# Patient Record
Sex: FEMALE | Race: ASIAN | NOT HISPANIC OR LATINO | URBAN - METROPOLITAN AREA
[De-identification: names, ages, dates, MRNs, and addresses within clinical notes are randomized per-mention and may not be internally consistent; named-entity substitution may affect disease eponyms.]

---

## 2018-03-31 ENCOUNTER — EMERGENCY (EMERGENCY)
Facility: HOSPITAL | Age: 73
LOS: 1 days | Discharge: HOME | End: 2018-03-31
Attending: EMERGENCY MEDICINE | Admitting: EMERGENCY MEDICINE
Payer: MEDICARE

## 2018-03-31 VITALS
DIASTOLIC BLOOD PRESSURE: 80 MMHG | OXYGEN SATURATION: 100 % | RESPIRATION RATE: 17 BRPM | HEART RATE: 70 BPM | TEMPERATURE: 98 F | SYSTOLIC BLOOD PRESSURE: 226 MMHG

## 2018-03-31 PROCEDURE — 99285 EMERGENCY DEPT VISIT HI MDM: CPT | Mod: 25,GC

## 2018-03-31 NOTE — ED ADULT TRIAGE NOTE - CHIEF COMPLAINT QUOTE
Pt. c/o headache and high blood pressure x 2 days; states she had headache yesterday after returning home from [facility], checked her BP and found it was 211/107, reports she then took "double dose" of her metoprolol and losartan. States symptoms resolved, but returned today again, and that her BP remains elevated despite medications. Admits to chest "heaviness", denies dizziness, vision changes, SOB, weakness, LOC, fever.

## 2018-04-01 VITALS
DIASTOLIC BLOOD PRESSURE: 84 MMHG | SYSTOLIC BLOOD PRESSURE: 194 MMHG | TEMPERATURE: 98 F | RESPIRATION RATE: 20 BRPM | HEART RATE: 89 BPM | OXYGEN SATURATION: 100 %

## 2018-04-01 LAB
ALBUMIN SERPL ELPH-MCNC: 4.3 G/DL — SIGNIFICANT CHANGE UP (ref 3.3–5)
ALP SERPL-CCNC: 53 U/L — SIGNIFICANT CHANGE UP (ref 40–120)
ALT FLD-CCNC: 16 U/L — SIGNIFICANT CHANGE UP (ref 4–33)
AST SERPL-CCNC: 24 U/L — SIGNIFICANT CHANGE UP (ref 4–32)
BASOPHILS # BLD AUTO: 0.03 K/UL — SIGNIFICANT CHANGE UP (ref 0–0.2)
BASOPHILS NFR BLD AUTO: 0.5 % — SIGNIFICANT CHANGE UP (ref 0–2)
BILIRUB SERPL-MCNC: 0.7 MG/DL — SIGNIFICANT CHANGE UP (ref 0.2–1.2)
BUN SERPL-MCNC: 11 MG/DL — SIGNIFICANT CHANGE UP (ref 7–23)
CALCIUM SERPL-MCNC: 9 MG/DL — SIGNIFICANT CHANGE UP (ref 8.4–10.5)
CHLORIDE SERPL-SCNC: 99 MMOL/L — SIGNIFICANT CHANGE UP (ref 98–107)
CO2 SERPL-SCNC: 28 MMOL/L — SIGNIFICANT CHANGE UP (ref 22–31)
CREAT SERPL-MCNC: 0.77 MG/DL — SIGNIFICANT CHANGE UP (ref 0.5–1.3)
EOSINOPHIL # BLD AUTO: 0.24 K/UL — SIGNIFICANT CHANGE UP (ref 0–0.5)
EOSINOPHIL NFR BLD AUTO: 3.6 % — SIGNIFICANT CHANGE UP (ref 0–6)
GLUCOSE SERPL-MCNC: 116 MG/DL — HIGH (ref 70–99)
HCT VFR BLD CALC: 39.3 % — SIGNIFICANT CHANGE UP (ref 34.5–45)
HGB BLD-MCNC: 13.2 G/DL — SIGNIFICANT CHANGE UP (ref 11.5–15.5)
IMM GRANULOCYTES # BLD AUTO: 0.03 # — SIGNIFICANT CHANGE UP
IMM GRANULOCYTES NFR BLD AUTO: 0.5 % — SIGNIFICANT CHANGE UP (ref 0–1.5)
LYMPHOCYTES # BLD AUTO: 2 K/UL — SIGNIFICANT CHANGE UP (ref 1–3.3)
LYMPHOCYTES # BLD AUTO: 30 % — SIGNIFICANT CHANGE UP (ref 13–44)
MCHC RBC-ENTMCNC: 31.1 PG — SIGNIFICANT CHANGE UP (ref 27–34)
MCHC RBC-ENTMCNC: 33.6 % — SIGNIFICANT CHANGE UP (ref 32–36)
MCV RBC AUTO: 92.7 FL — SIGNIFICANT CHANGE UP (ref 80–100)
MONOCYTES # BLD AUTO: 0.58 K/UL — SIGNIFICANT CHANGE UP (ref 0–0.9)
MONOCYTES NFR BLD AUTO: 8.7 % — SIGNIFICANT CHANGE UP (ref 2–14)
NEUTROPHILS # BLD AUTO: 3.78 K/UL — SIGNIFICANT CHANGE UP (ref 1.8–7.4)
NEUTROPHILS NFR BLD AUTO: 56.7 % — SIGNIFICANT CHANGE UP (ref 43–77)
NRBC # FLD: 0 — SIGNIFICANT CHANGE UP
PLATELET # BLD AUTO: 256 K/UL — SIGNIFICANT CHANGE UP (ref 150–400)
PMV BLD: 9 FL — SIGNIFICANT CHANGE UP (ref 7–13)
POTASSIUM SERPL-MCNC: 4.2 MMOL/L — SIGNIFICANT CHANGE UP (ref 3.5–5.3)
POTASSIUM SERPL-SCNC: 4.2 MMOL/L — SIGNIFICANT CHANGE UP (ref 3.5–5.3)
PROT SERPL-MCNC: 7.7 G/DL — SIGNIFICANT CHANGE UP (ref 6–8.3)
RBC # BLD: 4.24 M/UL — SIGNIFICANT CHANGE UP (ref 3.8–5.2)
RBC # FLD: 12.7 % — SIGNIFICANT CHANGE UP (ref 10.3–14.5)
SODIUM SERPL-SCNC: 139 MMOL/L — SIGNIFICANT CHANGE UP (ref 135–145)
TROPONIN T SERPL-MCNC: < 0.06 NG/ML — SIGNIFICANT CHANGE UP (ref 0–0.06)
WBC # BLD: 6.66 K/UL — SIGNIFICANT CHANGE UP (ref 3.8–10.5)
WBC # FLD AUTO: 6.66 K/UL — SIGNIFICANT CHANGE UP (ref 3.8–10.5)

## 2018-04-01 PROCEDURE — 71045 X-RAY EXAM CHEST 1 VIEW: CPT | Mod: 26

## 2018-04-01 PROCEDURE — 70450 CT HEAD/BRAIN W/O DYE: CPT | Mod: 26

## 2018-04-01 RX ORDER — LOSARTAN POTASSIUM 100 MG/1
100 TABLET, FILM COATED ORAL ONCE
Refills: 0 | Status: COMPLETED | OUTPATIENT
Start: 2018-04-01 | End: 2018-04-01

## 2018-04-01 RX ORDER — ACETAMINOPHEN 500 MG
975 TABLET ORAL ONCE
Refills: 0 | Status: COMPLETED | OUTPATIENT
Start: 2018-04-01 | End: 2018-04-01

## 2018-04-01 RX ORDER — LOSARTAN POTASSIUM 100 MG/1
300 TABLET, FILM COATED ORAL ONCE
Refills: 0 | Status: DISCONTINUED | OUTPATIENT
Start: 2018-04-01 | End: 2018-04-01

## 2018-04-01 RX ORDER — METOCLOPRAMIDE HCL 10 MG
10 TABLET ORAL ONCE
Refills: 0 | Status: COMPLETED | OUTPATIENT
Start: 2018-04-01 | End: 2018-04-01

## 2018-04-01 RX ORDER — HYDRALAZINE HCL 50 MG
10 TABLET ORAL ONCE
Refills: 0 | Status: COMPLETED | OUTPATIENT
Start: 2018-04-01 | End: 2018-04-01

## 2018-04-01 RX ADMIN — Medication 10 MILLIGRAM(S): at 02:20

## 2018-04-01 RX ADMIN — LOSARTAN POTASSIUM 100 MILLIGRAM(S): 100 TABLET, FILM COATED ORAL at 06:36

## 2018-04-01 RX ADMIN — Medication 10 MILLIGRAM(S): at 02:57

## 2018-04-01 RX ADMIN — Medication 975 MILLIGRAM(S): at 02:20

## 2018-04-01 RX ADMIN — Medication 975 MILLIGRAM(S): at 06:38

## 2018-04-01 NOTE — ED PROVIDER NOTE - MEDICAL DECISION MAKING DETAILS
71 yo F PMHx HTN p/w headache and elevated BPs, plan for CT head, EKG, labs + CE, BP control, reevaluate

## 2018-04-01 NOTE — ED PROVIDER NOTE - ATTENDING CONTRIBUTION TO CARE
73 y/o F with h/o HTN (on losartan and metoprolol) here with hypertension.  Pt recently returned from a trip to the [facility] 2 days ago and since returning has noted her BP has been running high, associated with intermittent headaches and chest discomfort.  Pt initially took an extra dose of her home medications, but tonight BP was in the 200s systolic and she came to ER.  Pt currently endorsing occipital headache.  No fever, chills, cough, neck pain or stiffness, SOB, abd pain, n/v, vision changes, weakness, numbness, leg pain or swelling.  Pt states she had some left sided chest discomfort earlier this evening, but not at this time.  Well appearing, lying comfortably in stretcher, awake and alert, nontoxic.  VSS, hypertensive.  NCAT, EOMI, PERRL.  Neck is supple, FROM, no rigidity.  Lungs cta bl.  Cards nl S1/S2, RRR, no MRG.  Abd soft ntnd.  No pedal edema or calf tenderness.  Distal pulses full and equal bilaterally.  No focal neuro deficits.  Plan to pain control, labs, ekg, ct head, reassess for BP control.  Sxs are vague with no focal deficits, but in setting of HTN will need to r/o end-organ involvement.

## 2018-04-01 NOTE — ED PROVIDER NOTE - OBJECTIVE STATEMENT
71 yo F PMHx HTN p/w headache and elevated BPs. Pt arrived from the [facility] 2 days ago and started having headaches at home. When she checked her BP at home it was elevated and she took double her regular doses of medications (Losartan 300 mg, Metoprolol 50 mg). Her headache improved and then she had a headache again today which prompted her to come to the ER. She also has some L-sided chest pressure which began yesterday with her headache. She denies vision changes, N/V, abd pain, fevers/chills, weakness, numbness or tingling.

## 2018-04-01 NOTE — ED ADULT NURSE NOTE - OBJECTIVE STATEMENT
Pt arrives to ED room 1 2 days after a flight from the [facility].  Pt c/o high BP the last couple of days with head and chest heaviness.  Pt denies pain.  Pt states only medical hx  is HTN which is usually controlled by her home meds.  Pt last took home meds at 9PM.  Pt states she doubled her dosage yesterday which resolved her symptoms but they came back.  20g iv placed to RT AC, labs drawn and sent.  Pt assessed by YARED GIORDANO.  Pt medicated as per EMAR.  Will continue to monitor.

## 2018-04-01 NOTE — ED PROVIDER NOTE - CARE PLAN
Principal Discharge DX:	Hypertension  Assessment and plan of treatment:	You were seen in the Emergency Department for elevated blood pressures.  1) Advance activity as tolerated.    2) Continue all previously prescribed medications as directed.    3) Follow up with your primary care physician in 24-48 hours - take copies of your results.    4) Return to the Emergency Department for worsening or persistent symptoms, and/or ANY NEW OR CONCERNING SYMPTOMS. If you have issues obtaining follow up, please call: [phone] to obtain a doctor or specialist who takes your insurance in your area.

## 2018-04-01 NOTE — ED ADULT NURSE NOTE - CHIEF COMPLAINT QUOTE
Pt. c/o headache and high blood pressure x 2 days; states she had headache yesterday after returning home from [facility], checked her BP and found it was 211/107, reports she then took "double dose" of her metoprolol and losartan. States symptoms resolved, but returned today again, and that her BP remains elevated despite medications. Admits to chest "heaviness", denies dizziness, vision changes, SOB, weakness, LOC, fever.

## 2018-04-01 NOTE — ED PROVIDER NOTE - NS ED ROS FT
GENERAL: No fever or chills, EYES: no change in vision, HEENT: no trouble swallowing or speaking, CARDIAC: +chest pressure, PULMONARY: no cough or SOB, GI: no abdominal pain, no nausea, no vomiting, no diarrhea or constipation, : No changes in urination, SKIN: no rashes, NEURO: +headache,  MSK: No joint pain ~Joan Arreola M.D. Resident

## 2018-04-01 NOTE — ED PROVIDER NOTE - PLAN OF CARE
You were seen in the Emergency Department for elevated blood pressures.  1) Advance activity as tolerated.    2) Continue all previously prescribed medications as directed.    3) Follow up with your primary care physician in 24-48 hours - take copies of your results.    4) Return to the Emergency Department for worsening or persistent symptoms, and/or ANY NEW OR CONCERNING SYMPTOMS. If you have issues obtaining follow up, please call: [phone] to obtain a doctor or specialist who takes your insurance in your area.

## 2018-07-10 ENCOUNTER — EMERGENCY (EMERGENCY)
Facility: HOSPITAL | Age: 73
LOS: 1 days | Discharge: HOME | End: 2018-07-10
Attending: EMERGENCY MEDICINE | Admitting: EMERGENCY MEDICINE
Payer: MEDICARE

## 2018-07-10 VITALS
TEMPERATURE: 98 F | HEART RATE: 63 BPM | RESPIRATION RATE: 18 BRPM | SYSTOLIC BLOOD PRESSURE: 171 MMHG | OXYGEN SATURATION: 99 % | DIASTOLIC BLOOD PRESSURE: 71 MMHG

## 2018-07-10 VITALS
SYSTOLIC BLOOD PRESSURE: 159 MMHG | HEART RATE: 53 BPM | OXYGEN SATURATION: 99 % | RESPIRATION RATE: 18 BRPM | DIASTOLIC BLOOD PRESSURE: 49 MMHG

## 2018-07-10 LAB
ALBUMIN SERPL ELPH-MCNC: 4.5 G/DL — SIGNIFICANT CHANGE UP (ref 3.3–5)
ALP SERPL-CCNC: 58 U/L — SIGNIFICANT CHANGE UP (ref 40–120)
ALT FLD-CCNC: 23 U/L — SIGNIFICANT CHANGE UP (ref 4–33)
APPEARANCE UR: CLEAR — SIGNIFICANT CHANGE UP
AST SERPL-CCNC: 42 U/L — HIGH (ref 4–32)
BACTERIA # UR AUTO: SIGNIFICANT CHANGE UP
BASOPHILS # BLD AUTO: 0.04 K/UL — SIGNIFICANT CHANGE UP (ref 0–0.2)
BASOPHILS NFR BLD AUTO: 0.7 % — SIGNIFICANT CHANGE UP (ref 0–2)
BILIRUB SERPL-MCNC: 0.7 MG/DL — SIGNIFICANT CHANGE UP (ref 0.2–1.2)
BILIRUB UR-MCNC: NEGATIVE — SIGNIFICANT CHANGE UP
BLOOD UR QL VISUAL: NEGATIVE — SIGNIFICANT CHANGE UP
BUN SERPL-MCNC: 12 MG/DL — SIGNIFICANT CHANGE UP (ref 7–23)
CALCIUM SERPL-MCNC: 9.8 MG/DL — SIGNIFICANT CHANGE UP (ref 8.4–10.5)
CHLORIDE SERPL-SCNC: 101 MMOL/L — SIGNIFICANT CHANGE UP (ref 98–107)
CO2 SERPL-SCNC: 25 MMOL/L — SIGNIFICANT CHANGE UP (ref 22–31)
COLOR SPEC: SIGNIFICANT CHANGE UP
CREAT SERPL-MCNC: 0.81 MG/DL — SIGNIFICANT CHANGE UP (ref 0.5–1.3)
EOSINOPHIL # BLD AUTO: 0.31 K/UL — SIGNIFICANT CHANGE UP (ref 0–0.5)
EOSINOPHIL NFR BLD AUTO: 5.2 % — SIGNIFICANT CHANGE UP (ref 0–6)
GLUCOSE SERPL-MCNC: 100 MG/DL — HIGH (ref 70–99)
GLUCOSE UR-MCNC: NEGATIVE — SIGNIFICANT CHANGE UP
HCT VFR BLD CALC: 39.4 % — SIGNIFICANT CHANGE UP (ref 34.5–45)
HGB BLD-MCNC: 13.1 G/DL — SIGNIFICANT CHANGE UP (ref 11.5–15.5)
IMM GRANULOCYTES # BLD AUTO: 0.01 # — SIGNIFICANT CHANGE UP
IMM GRANULOCYTES NFR BLD AUTO: 0.2 % — SIGNIFICANT CHANGE UP (ref 0–1.5)
KETONES UR-MCNC: NEGATIVE — SIGNIFICANT CHANGE UP
LEUKOCYTE ESTERASE UR-ACNC: NEGATIVE — SIGNIFICANT CHANGE UP
LYMPHOCYTES # BLD AUTO: 2.34 K/UL — SIGNIFICANT CHANGE UP (ref 1–3.3)
LYMPHOCYTES # BLD AUTO: 39.4 % — SIGNIFICANT CHANGE UP (ref 13–44)
MCHC RBC-ENTMCNC: 31 PG — SIGNIFICANT CHANGE UP (ref 27–34)
MCHC RBC-ENTMCNC: 33.2 % — SIGNIFICANT CHANGE UP (ref 32–36)
MCV RBC AUTO: 93.1 FL — SIGNIFICANT CHANGE UP (ref 80–100)
MONOCYTES # BLD AUTO: 0.45 K/UL — SIGNIFICANT CHANGE UP (ref 0–0.9)
MONOCYTES NFR BLD AUTO: 7.6 % — SIGNIFICANT CHANGE UP (ref 2–14)
MUCOUS THREADS # UR AUTO: SIGNIFICANT CHANGE UP
NEUTROPHILS # BLD AUTO: 2.79 K/UL — SIGNIFICANT CHANGE UP (ref 1.8–7.4)
NEUTROPHILS NFR BLD AUTO: 46.9 % — SIGNIFICANT CHANGE UP (ref 43–77)
NITRITE UR-MCNC: POSITIVE — HIGH
NRBC # FLD: 0 — SIGNIFICANT CHANGE UP
PH UR: 6.5 — SIGNIFICANT CHANGE UP (ref 4.6–8)
PLATELET # BLD AUTO: 240 K/UL — SIGNIFICANT CHANGE UP (ref 150–400)
PMV BLD: 9 FL — SIGNIFICANT CHANGE UP (ref 7–13)
POTASSIUM SERPL-MCNC: 4.7 MMOL/L — SIGNIFICANT CHANGE UP (ref 3.5–5.3)
POTASSIUM SERPL-SCNC: 4.7 MMOL/L — SIGNIFICANT CHANGE UP (ref 3.5–5.3)
PROT SERPL-MCNC: 8.3 G/DL — SIGNIFICANT CHANGE UP (ref 6–8.3)
PROT UR-MCNC: NEGATIVE MG/DL — SIGNIFICANT CHANGE UP
RBC # BLD: 4.23 M/UL — SIGNIFICANT CHANGE UP (ref 3.8–5.2)
RBC # FLD: 12 % — SIGNIFICANT CHANGE UP (ref 10.3–14.5)
RBC CASTS # UR COMP ASSIST: SIGNIFICANT CHANGE UP (ref 0–?)
SODIUM SERPL-SCNC: 140 MMOL/L — SIGNIFICANT CHANGE UP (ref 135–145)
SP GR SPEC: 1.01 — SIGNIFICANT CHANGE UP (ref 1–1.04)
SQUAMOUS # UR AUTO: SIGNIFICANT CHANGE UP
UROBILINOGEN FLD QL: NORMAL MG/DL — SIGNIFICANT CHANGE UP
WBC # BLD: 5.94 K/UL — SIGNIFICANT CHANGE UP (ref 3.8–10.5)
WBC # FLD AUTO: 5.94 K/UL — SIGNIFICANT CHANGE UP (ref 3.8–10.5)
WBC UR QL: SIGNIFICANT CHANGE UP (ref 0–?)

## 2018-07-10 PROCEDURE — 70450 CT HEAD/BRAIN W/O DYE: CPT | Mod: 26

## 2018-07-10 PROCEDURE — 99284 EMERGENCY DEPT VISIT MOD MDM: CPT | Mod: GC

## 2018-07-10 RX ORDER — CEPHALEXIN 500 MG
1 CAPSULE ORAL
Qty: 20 | Refills: 0
Start: 2018-07-10 | End: 2018-07-19

## 2018-07-10 RX ORDER — SODIUM CHLORIDE 9 MG/ML
1000 INJECTION INTRAMUSCULAR; INTRAVENOUS; SUBCUTANEOUS ONCE
Refills: 0 | Status: COMPLETED | OUTPATIENT
Start: 2018-07-10 | End: 2018-07-10

## 2018-07-10 RX ORDER — MECLIZINE HCL 12.5 MG
25 TABLET ORAL ONCE
Refills: 0 | Status: COMPLETED | OUTPATIENT
Start: 2018-07-10 | End: 2018-07-10

## 2018-07-10 RX ORDER — CEFTRIAXONE 500 MG/1
1 INJECTION, POWDER, FOR SOLUTION INTRAMUSCULAR; INTRAVENOUS ONCE
Refills: 0 | Status: COMPLETED | OUTPATIENT
Start: 2018-07-10 | End: 2018-07-10

## 2018-07-10 RX ADMIN — CEFTRIAXONE 100 GRAM(S): 500 INJECTION, POWDER, FOR SOLUTION INTRAMUSCULAR; INTRAVENOUS at 13:45

## 2018-07-10 RX ADMIN — SODIUM CHLORIDE 1000 MILLILITER(S): 9 INJECTION INTRAMUSCULAR; INTRAVENOUS; SUBCUTANEOUS at 13:22

## 2018-07-10 NOTE — ED ADULT TRIAGE NOTE - CHIEF COMPLAINT QUOTE
Patient fell down 6 stairs on her back on Sunday. Pt states that yesterday she felt lightheaded and today it feels worse. Pt not sure if she hit her head but states that she did not lose consciousness.  Pt bruised her finger and right elbow.

## 2018-07-10 NOTE — ED PROVIDER NOTE - PROGRESS NOTE DETAILS
while attempting to ambulate pt, pt began to feel dizzy like room is spinning. symptoms resolved and pt was able to ambulate without difficulty. pt feels better. labs notable for UTI. head CT nonactionable, only chronic changes seen. will dc.

## 2018-07-10 NOTE — ED PROVIDER NOTE - OBJECTIVE STATEMENT
Bella Soto M.D: 72F hx HTN, on baby ASA, p/w lightheadeness described as generalized weakness since mechanical fall down 6 steps 2 days prior. unsure if lost consciousness, unsure if hit head. denies headaches, denies nausea, vomiting. no difficulties ambulating, no neck pain. no tinnitus. notes some blurry vision which is intermittent.  pt concerned she has a blood clot in her head. 72F hx HTN, on baby ASA, p/w lightheadedness described as generalized weakness since mechanical fall down 6 steps 2 days prior. unsure if lost consciousness, unsure if hit head. denies headaches, denies nausea, vomiting. no difficulties ambulating, no neck pain. no tinnitus. notes some blurry vision which is intermittent. pt concerned she has a blood clot in her head.

## 2018-07-10 NOTE — ED PROVIDER NOTE - MEDICAL DECISION MAKING DETAILS
72F p/w 2 days of lightheadedness and 'head not feeling right' since mechanical fall and likely head strike. only takes baby ASA and physical exam including full neurologic exam is wnl. suspect concussion given vague symptoms and likely head strike, but given new peripheral vertigo on exam (room spinning when stand up) will CT to eval for bleed. basic labs including urine to look for inciting event for fall.

## 2018-07-10 NOTE — ED PROVIDER NOTE - ATTENDING CONTRIBUTION TO CARE
Attending Attestation: Dr. Carver  I have personally performed a history and physical examination of the patient and discussed management with the resident as well as the patient.  I reviewed the resident's note and agree with the documented findings and plan of care.  I have authored and modified critical sections of the Provider Note, including but not limited to HPI, Physical Exam and MDM. 72F p/w 2 days of lightheadedness and 'head not feeling right' since mechanical fall and likely head strike. only takes baby ASA and physical exam including full neurologic exam is wnl. suspect concussion given vague symptoms and likely head strike, but given new peripheral vertigo on exam (room spinning when stand up) will CT to eval for bleed. basic labs including urine to look for inciting event for fall.

## 2018-07-10 NOTE — ED PROVIDER NOTE - PHYSICAL EXAMINATION
Bella Soto M.D.:   patient awake alert seen lying on stretcher NAD .   LUNGS CTAB no wheeze no crackle.   CARD RRR no m/r/g.    Abdomen soft NT ND no rebound no guarding no CVA tenderness.   EXT WWP no edema no calf tenderness CV 2+DP/PT bilaterally. no midline c/t/l spine tenderness  neuro A&Ox3 cn 2-12 intact, gross motor and sensory intact in all extremites gait normal.    skin warm and dry no rash  HEENT: moist mucous membranes, PERRL, EOMI

## 2018-07-10 NOTE — ED ADULT NURSE NOTE - OBJECTIVE STATEMENT
72 y female A+Ox3 presents to the ER with the complaint of dizziness. Pt states that on Sunday she fell down 6 stairs but denies LOC or hitting her head. Pt states she did not feel dizzy before her fall and she fell when she turned midway going down the stairs. Ecchymotic area noted on the inside of the right bicep. Pt states she felt fine after the fall but states yesterday she felt dizzy when she got up out of bed and today she felt the same way. Pt denies dizziness when she is sitting still or laying in bed but only when she gets up. States the only hx she has is htn.  Hx of allergie to penicillin. Denies any cardiac hx, cp, n/v. States she has a little discomfort on the back of her neck but no pain. Pt noted to be ambulating well without assistance. Labs drawn and sent, 20 g iv placed on right hand.

## 2018-07-11 LAB — SPECIMEN SOURCE: SIGNIFICANT CHANGE UP

## 2018-07-12 LAB — BACTERIA UR CULT: SIGNIFICANT CHANGE UP

## 2018-09-28 NOTE — ED PROVIDER NOTE - GASTROINTESTINAL, MLM
Seen by resource clinican and deemed appropriate to go home.  Pt is willing and agreeable to f/u with safety plan.    Abdomen soft, non-tender, no guarding.

## 2020-01-29 ENCOUNTER — APPOINTMENT (OUTPATIENT)
Dept: ORTHOPEDIC SURGERY | Facility: CLINIC | Age: 75
End: 2020-01-29
Payer: MEDICARE

## 2020-01-29 VITALS
WEIGHT: 140 LBS | SYSTOLIC BLOOD PRESSURE: 134 MMHG | BODY MASS INDEX: 27.48 KG/M2 | HEIGHT: 60 IN | HEART RATE: 70 BPM | DIASTOLIC BLOOD PRESSURE: 74 MMHG

## 2020-01-29 PROCEDURE — 99203 OFFICE O/P NEW LOW 30 MIN: CPT

## 2020-01-29 PROCEDURE — 73562 X-RAY EXAM OF KNEE 3: CPT | Mod: 50

## 2020-04-23 ENCOUNTER — APPOINTMENT (OUTPATIENT)
Dept: ORTHOPEDIC SURGERY | Facility: CLINIC | Age: 75
End: 2020-04-23

## 2020-04-29 ENCOUNTER — APPOINTMENT (OUTPATIENT)
Dept: ORTHOPEDIC SURGERY | Facility: CLINIC | Age: 75
End: 2020-04-29

## 2022-02-02 ENCOUNTER — APPOINTMENT (OUTPATIENT)
Dept: ORTHOPEDIC SURGERY | Facility: CLINIC | Age: 77
End: 2022-02-02
Payer: MEDICARE

## 2022-02-02 VITALS
HEART RATE: 83 BPM | DIASTOLIC BLOOD PRESSURE: 86 MMHG | WEIGHT: 146 LBS | HEIGHT: 60 IN | BODY MASS INDEX: 28.66 KG/M2 | SYSTOLIC BLOOD PRESSURE: 143 MMHG

## 2022-02-02 PROCEDURE — 73562 X-RAY EXAM OF KNEE 3: CPT | Mod: 50

## 2022-02-02 PROCEDURE — 99214 OFFICE O/P EST MOD 30 MIN: CPT

## 2022-03-17 ENCOUNTER — FORM ENCOUNTER (OUTPATIENT)
Age: 77
End: 2022-03-17

## 2022-03-18 ENCOUNTER — APPOINTMENT (OUTPATIENT)
Dept: ORTHOPEDIC SURGERY | Facility: HOSPITAL | Age: 77
End: 2022-03-18

## 2022-03-29 ENCOUNTER — OUTPATIENT (OUTPATIENT)
Dept: OUTPATIENT SERVICES | Facility: HOSPITAL | Age: 77
LOS: 1 days | End: 2022-03-29
Payer: MEDICARE

## 2022-03-29 VITALS
TEMPERATURE: 97 F | HEIGHT: 58.5 IN | DIASTOLIC BLOOD PRESSURE: 75 MMHG | HEART RATE: 61 BPM | OXYGEN SATURATION: 98 % | WEIGHT: 145.06 LBS | RESPIRATION RATE: 16 BRPM | SYSTOLIC BLOOD PRESSURE: 120 MMHG

## 2022-03-29 DIAGNOSIS — I10 ESSENTIAL (PRIMARY) HYPERTENSION: ICD-10-CM

## 2022-03-29 DIAGNOSIS — Z90.49 ACQUIRED ABSENCE OF OTHER SPECIFIED PARTS OF DIGESTIVE TRACT: Chronic | ICD-10-CM

## 2022-03-29 DIAGNOSIS — E11.9 TYPE 2 DIABETES MELLITUS WITHOUT COMPLICATIONS: ICD-10-CM

## 2022-03-29 DIAGNOSIS — M19.90 UNSPECIFIED OSTEOARTHRITIS, UNSPECIFIED SITE: ICD-10-CM

## 2022-03-29 DIAGNOSIS — M17.11 UNILATERAL PRIMARY OSTEOARTHRITIS, RIGHT KNEE: ICD-10-CM

## 2022-03-29 LAB
A1C WITH ESTIMATED AVERAGE GLUCOSE RESULT: 7.2 % — HIGH (ref 4–5.6)
ANION GAP SERPL CALC-SCNC: 13 MMOL/L — SIGNIFICANT CHANGE UP (ref 7–14)
APPEARANCE UR: CLEAR — SIGNIFICANT CHANGE UP
BILIRUB UR-MCNC: NEGATIVE — SIGNIFICANT CHANGE UP
BLD GP AB SCN SERPL QL: NEGATIVE — SIGNIFICANT CHANGE UP
BUN SERPL-MCNC: 18 MG/DL — SIGNIFICANT CHANGE UP (ref 7–23)
CALCIUM SERPL-MCNC: 9.1 MG/DL — SIGNIFICANT CHANGE UP (ref 8.4–10.5)
CHLORIDE SERPL-SCNC: 104 MMOL/L — SIGNIFICANT CHANGE UP (ref 98–107)
CO2 SERPL-SCNC: 25 MMOL/L — SIGNIFICANT CHANGE UP (ref 22–31)
COLOR SPEC: SIGNIFICANT CHANGE UP
CREAT SERPL-MCNC: 1.11 MG/DL — SIGNIFICANT CHANGE UP (ref 0.5–1.3)
DIFF PNL FLD: NEGATIVE — SIGNIFICANT CHANGE UP
EGFR: 51 ML/MIN/1.73M2 — LOW
ESTIMATED AVERAGE GLUCOSE: 160 — SIGNIFICANT CHANGE UP
GLUCOSE SERPL-MCNC: 140 MG/DL — HIGH (ref 70–99)
GLUCOSE UR QL: ABNORMAL
HCT VFR BLD CALC: 37.3 % — SIGNIFICANT CHANGE UP (ref 34.5–45)
HGB BLD-MCNC: 12.4 G/DL — SIGNIFICANT CHANGE UP (ref 11.5–15.5)
KETONES UR-MCNC: NEGATIVE — SIGNIFICANT CHANGE UP
LEUKOCYTE ESTERASE UR-ACNC: NEGATIVE — SIGNIFICANT CHANGE UP
MCHC RBC-ENTMCNC: 32.4 PG — SIGNIFICANT CHANGE UP (ref 27–34)
MCHC RBC-ENTMCNC: 33.2 GM/DL — SIGNIFICANT CHANGE UP (ref 32–36)
MCV RBC AUTO: 97.4 FL — SIGNIFICANT CHANGE UP (ref 80–100)
NITRITE UR-MCNC: NEGATIVE — SIGNIFICANT CHANGE UP
NRBC # BLD: 0 /100 WBCS — SIGNIFICANT CHANGE UP
NRBC # FLD: 0 K/UL — SIGNIFICANT CHANGE UP
PH UR: 5.5 — SIGNIFICANT CHANGE UP (ref 5–8)
PLATELET # BLD AUTO: 261 K/UL — SIGNIFICANT CHANGE UP (ref 150–400)
POTASSIUM SERPL-MCNC: 4 MMOL/L — SIGNIFICANT CHANGE UP (ref 3.5–5.3)
POTASSIUM SERPL-SCNC: 4 MMOL/L — SIGNIFICANT CHANGE UP (ref 3.5–5.3)
PROT UR-MCNC: ABNORMAL
RBC # BLD: 3.83 M/UL — SIGNIFICANT CHANGE UP (ref 3.8–5.2)
RBC # FLD: 12.3 % — SIGNIFICANT CHANGE UP (ref 10.3–14.5)
RH IG SCN BLD-IMP: POSITIVE — SIGNIFICANT CHANGE UP
SODIUM SERPL-SCNC: 142 MMOL/L — SIGNIFICANT CHANGE UP (ref 135–145)
SP GR SPEC: 1.01 — SIGNIFICANT CHANGE UP (ref 1–1.05)
UROBILINOGEN FLD QL: SIGNIFICANT CHANGE UP
WBC # BLD: 5.62 K/UL — SIGNIFICANT CHANGE UP (ref 3.8–10.5)
WBC # FLD AUTO: 5.62 K/UL — SIGNIFICANT CHANGE UP (ref 3.8–10.5)

## 2022-03-29 PROCEDURE — 93010 ELECTROCARDIOGRAM REPORT: CPT

## 2022-03-29 RX ORDER — TRAMADOL HYDROCHLORIDE 50 MG/1
25 TABLET ORAL ONCE
Refills: 0 | Status: DISCONTINUED | OUTPATIENT
Start: 2022-04-08 | End: 2022-04-08

## 2022-03-29 RX ORDER — PANTOPRAZOLE SODIUM 20 MG/1
40 TABLET, DELAYED RELEASE ORAL ONCE
Refills: 0 | Status: COMPLETED | OUTPATIENT
Start: 2022-04-08 | End: 2022-04-08

## 2022-03-29 RX ORDER — SODIUM CHLORIDE 9 MG/ML
1000 INJECTION, SOLUTION INTRAVENOUS
Refills: 0 | Status: DISCONTINUED | OUTPATIENT
Start: 2022-04-08 | End: 2022-04-09

## 2022-03-29 RX ORDER — ACETAMINOPHEN 500 MG
1000 TABLET ORAL ONCE
Refills: 0 | Status: COMPLETED | OUTPATIENT
Start: 2022-04-08 | End: 2022-04-08

## 2022-03-29 NOTE — H&P PST ADULT - NSANTHOSAYNRD_GEN_A_CORE
No. EDISON screening performed.  STOP BANG Legend: 0-2 = LOW Risk; 3-4 = INTERMEDIATE Risk; 5-8 = HIGH Risk

## 2022-03-29 NOTE — H&P PST ADULT - MUSCULOSKELETAL COMMENTS
hx of right knee pain which has been worsening, dx with primary osteoarthritis right knee scheduled for Right Total Knee Replacement right knee pain

## 2022-03-29 NOTE — H&P PST ADULT - PROBLEM SELECTOR PLAN 1
Right Total Knee Replacement 4/8/22    CBC BMP T&S UA CS HgbA1C nasal MRSA EKG    Preop instructions and antibacterial soap given and explained (verbal and written), with teach back.

## 2022-03-29 NOTE — H&P PST ADULT - PROBLEM SELECTOR PLAN 2
Pt advised to take her Telmisartan morning of surgery as usual  with sip of water  Pt was seen by her cardiologist, clearance, stress and echo on chart

## 2022-03-29 NOTE — H&P PST ADULT - HISTORY OF PRESENT ILLNESS
77 y/o female with hx of right knee pain which has been worsening, dx with primary osteoarthritis right knee scheduled for Right Total Knee Replacement

## 2022-03-29 NOTE — H&P PST ADULT - NSICDXPASTMEDICALHX_GEN_ALL_CORE_FT
PAST MEDICAL HISTORY:  Diabetes mellitus recently started on Januvia    HTN (hypertension)     Hyperlipidemia

## 2022-04-01 ENCOUNTER — APPOINTMENT (OUTPATIENT)
Dept: ORTHOPEDIC SURGERY | Facility: CLINIC | Age: 77
End: 2022-04-01

## 2022-04-05 ENCOUNTER — OUTPATIENT (OUTPATIENT)
Dept: OUTPATIENT SERVICES | Facility: HOSPITAL | Age: 77
LOS: 1 days | End: 2022-04-05

## 2022-04-05 DIAGNOSIS — Z01.818 ENCOUNTER FOR OTHER PREPROCEDURAL EXAMINATION: ICD-10-CM

## 2022-04-05 DIAGNOSIS — Z90.49 ACQUIRED ABSENCE OF OTHER SPECIFIED PARTS OF DIGESTIVE TRACT: Chronic | ICD-10-CM

## 2022-04-06 LAB
CULTURE RESULTS: SIGNIFICANT CHANGE UP
MRSA PCR RESULT.: SIGNIFICANT CHANGE UP
S AUREUS DNA NOSE QL NAA+PROBE: SIGNIFICANT CHANGE UP
SPECIMEN SOURCE: SIGNIFICANT CHANGE UP

## 2022-04-07 ENCOUNTER — TRANSCRIPTION ENCOUNTER (OUTPATIENT)
Age: 77
End: 2022-04-07

## 2022-04-08 ENCOUNTER — TRANSCRIPTION ENCOUNTER (OUTPATIENT)
Age: 77
End: 2022-04-08

## 2022-04-08 ENCOUNTER — APPOINTMENT (OUTPATIENT)
Dept: ORTHOPEDIC SURGERY | Facility: HOSPITAL | Age: 77
End: 2022-04-08

## 2022-04-08 ENCOUNTER — INPATIENT (INPATIENT)
Facility: HOSPITAL | Age: 77
LOS: 0 days | Discharge: HOME HEALTH | End: 2022-04-09
Attending: ORTHOPAEDIC SURGERY | Admitting: ORTHOPAEDIC SURGERY
Payer: MEDICARE

## 2022-04-08 ENCOUNTER — RESULT REVIEW (OUTPATIENT)
Age: 77
End: 2022-04-08

## 2022-04-08 VITALS
HEART RATE: 64 BPM | TEMPERATURE: 98 F | RESPIRATION RATE: 16 BRPM | DIASTOLIC BLOOD PRESSURE: 54 MMHG | SYSTOLIC BLOOD PRESSURE: 117 MMHG | HEIGHT: 58.5 IN | OXYGEN SATURATION: 99 % | WEIGHT: 145.06 LBS

## 2022-04-08 DIAGNOSIS — Z96.651 PRESENCE OF RIGHT ARTIFICIAL KNEE JOINT: Chronic | ICD-10-CM

## 2022-04-08 DIAGNOSIS — Z90.49 ACQUIRED ABSENCE OF OTHER SPECIFIED PARTS OF DIGESTIVE TRACT: Chronic | ICD-10-CM

## 2022-04-08 DIAGNOSIS — M17.11 UNILATERAL PRIMARY OSTEOARTHRITIS, RIGHT KNEE: ICD-10-CM

## 2022-04-08 LAB
ANION GAP SERPL CALC-SCNC: 11 MMOL/L — SIGNIFICANT CHANGE UP (ref 7–14)
BUN SERPL-MCNC: 19 MG/DL — SIGNIFICANT CHANGE UP (ref 7–23)
CALCIUM SERPL-MCNC: 8.5 MG/DL — SIGNIFICANT CHANGE UP (ref 8.4–10.5)
CHLORIDE SERPL-SCNC: 105 MMOL/L — SIGNIFICANT CHANGE UP (ref 98–107)
CO2 SERPL-SCNC: 22 MMOL/L — SIGNIFICANT CHANGE UP (ref 22–31)
CREAT SERPL-MCNC: 1.15 MG/DL — SIGNIFICANT CHANGE UP (ref 0.5–1.3)
EGFR: 49 ML/MIN/1.73M2 — LOW
GLUCOSE BLDC GLUCOMTR-MCNC: 142 MG/DL — HIGH (ref 70–99)
GLUCOSE BLDC GLUCOMTR-MCNC: 162 MG/DL — HIGH (ref 70–99)
GLUCOSE BLDC GLUCOMTR-MCNC: 180 MG/DL — HIGH (ref 70–99)
GLUCOSE BLDC GLUCOMTR-MCNC: 190 MG/DL — HIGH (ref 70–99)
GLUCOSE SERPL-MCNC: 155 MG/DL — HIGH (ref 70–99)
HCT VFR BLD CALC: 32 % — LOW (ref 34.5–45)
HGB BLD-MCNC: 11 G/DL — LOW (ref 11.5–15.5)
MCHC RBC-ENTMCNC: 32.8 PG — SIGNIFICANT CHANGE UP (ref 27–34)
MCHC RBC-ENTMCNC: 34.4 GM/DL — SIGNIFICANT CHANGE UP (ref 32–36)
MCV RBC AUTO: 95.5 FL — SIGNIFICANT CHANGE UP (ref 80–100)
NRBC # BLD: 0 /100 WBCS — SIGNIFICANT CHANGE UP
NRBC # FLD: 0 K/UL — SIGNIFICANT CHANGE UP
PLATELET # BLD AUTO: 193 K/UL — SIGNIFICANT CHANGE UP (ref 150–400)
POTASSIUM SERPL-MCNC: 4.4 MMOL/L — SIGNIFICANT CHANGE UP (ref 3.5–5.3)
POTASSIUM SERPL-SCNC: 4.4 MMOL/L — SIGNIFICANT CHANGE UP (ref 3.5–5.3)
RBC # BLD: 3.35 M/UL — LOW (ref 3.8–5.2)
RBC # FLD: 12 % — SIGNIFICANT CHANGE UP (ref 10.3–14.5)
SODIUM SERPL-SCNC: 138 MMOL/L — SIGNIFICANT CHANGE UP (ref 135–145)
WBC # BLD: 6.25 K/UL — SIGNIFICANT CHANGE UP (ref 3.8–10.5)
WBC # FLD AUTO: 6.25 K/UL — SIGNIFICANT CHANGE UP (ref 3.8–10.5)

## 2022-04-08 PROCEDURE — 88311 DECALCIFY TISSUE: CPT | Mod: 26

## 2022-04-08 PROCEDURE — 88305 TISSUE EXAM BY PATHOLOGIST: CPT | Mod: 26

## 2022-04-08 PROCEDURE — 73560 X-RAY EXAM OF KNEE 1 OR 2: CPT | Mod: 26,RT

## 2022-04-08 PROCEDURE — 27447 TOTAL KNEE ARTHROPLASTY: CPT | Mod: RT

## 2022-04-08 DEVICE — PATELLA VE 29MM: Type: IMPLANTABLE DEVICE | Status: FUNCTIONAL

## 2022-04-08 DEVICE — STEM TIBIA PERSONA SZ  5/CR: Type: IMPLANTABLE DEVICE | Status: FUNCTIONAL

## 2022-04-08 DEVICE — CEMENT SIMPLEX P 40GM: Type: IMPLANTABLE DEVICE | Status: FUNCTIONAL

## 2022-04-08 DEVICE — IMPLANTABLE DEVICE: Type: IMPLANTABLE DEVICE | Status: FUNCTIONAL

## 2022-04-08 DEVICE — ZIMMER FEMALE HEX SCREW MAGNETIC 2.5MM X 25MM: Type: IMPLANTABLE DEVICE | Status: FUNCTIONAL

## 2022-04-08 DEVICE — ZIMMER/NEXGEN SMOOTH PIN 3.2X75MM: Type: IMPLANTABLE DEVICE | Status: FUNCTIONAL

## 2022-04-08 DEVICE — SCREW HEX HEADED 3.5X48MM: Type: IMPLANTABLE DEVICE | Status: FUNCTIONAL

## 2022-04-08 RX ORDER — DEXTROSE 50 % IN WATER 50 %
15 SYRINGE (ML) INTRAVENOUS ONCE
Refills: 0 | Status: DISCONTINUED | OUTPATIENT
Start: 2022-04-08 | End: 2022-04-09

## 2022-04-08 RX ORDER — LOSARTAN POTASSIUM 100 MG/1
100 TABLET, FILM COATED ORAL DAILY
Refills: 0 | Status: DISCONTINUED | OUTPATIENT
Start: 2022-04-08 | End: 2022-04-09

## 2022-04-08 RX ORDER — GABAPENTIN 400 MG/1
100 CAPSULE ORAL THREE TIMES A DAY
Refills: 0 | Status: DISCONTINUED | OUTPATIENT
Start: 2022-04-09 | End: 2022-04-09

## 2022-04-08 RX ORDER — OXYCODONE HYDROCHLORIDE 5 MG/1
5 TABLET ORAL EVERY 4 HOURS
Refills: 0 | Status: DISCONTINUED | OUTPATIENT
Start: 2022-04-08 | End: 2022-04-09

## 2022-04-08 RX ORDER — KETOROLAC TROMETHAMINE 30 MG/ML
15 SYRINGE (ML) INJECTION EVERY 6 HOURS
Refills: 0 | Status: DISCONTINUED | OUTPATIENT
Start: 2022-04-08 | End: 2022-04-09

## 2022-04-08 RX ORDER — SENNA PLUS 8.6 MG/1
2 TABLET ORAL AT BEDTIME
Refills: 0 | Status: DISCONTINUED | OUTPATIENT
Start: 2022-04-08 | End: 2022-04-09

## 2022-04-08 RX ORDER — HYDROMORPHONE HYDROCHLORIDE 2 MG/ML
0.5 INJECTION INTRAMUSCULAR; INTRAVENOUS; SUBCUTANEOUS ONCE
Refills: 0 | Status: DISCONTINUED | OUTPATIENT
Start: 2022-04-08 | End: 2022-04-09

## 2022-04-08 RX ORDER — SODIUM CHLORIDE 9 MG/ML
1000 INJECTION, SOLUTION INTRAVENOUS
Refills: 0 | Status: DISCONTINUED | OUTPATIENT
Start: 2022-04-08 | End: 2022-04-09

## 2022-04-08 RX ORDER — METOPROLOL TARTRATE 50 MG
50 TABLET ORAL DAILY
Refills: 0 | Status: DISCONTINUED | OUTPATIENT
Start: 2022-04-08 | End: 2022-04-09

## 2022-04-08 RX ORDER — DEXTROSE 50 % IN WATER 50 %
25 SYRINGE (ML) INTRAVENOUS ONCE
Refills: 0 | Status: DISCONTINUED | OUTPATIENT
Start: 2022-04-08 | End: 2022-04-09

## 2022-04-08 RX ORDER — ACETAMINOPHEN 500 MG
975 TABLET ORAL EVERY 8 HOURS
Refills: 0 | Status: DISCONTINUED | OUTPATIENT
Start: 2022-04-08 | End: 2022-04-09

## 2022-04-08 RX ORDER — ATORVASTATIN CALCIUM 80 MG/1
80 TABLET, FILM COATED ORAL AT BEDTIME
Refills: 0 | Status: DISCONTINUED | OUTPATIENT
Start: 2022-04-08 | End: 2022-04-09

## 2022-04-08 RX ORDER — OXYCODONE HYDROCHLORIDE 5 MG/1
2.5 TABLET ORAL EVERY 4 HOURS
Refills: 0 | Status: DISCONTINUED | OUTPATIENT
Start: 2022-04-08 | End: 2022-04-09

## 2022-04-08 RX ORDER — DEXTROSE 50 % IN WATER 50 %
12.5 SYRINGE (ML) INTRAVENOUS ONCE
Refills: 0 | Status: DISCONTINUED | OUTPATIENT
Start: 2022-04-08 | End: 2022-04-09

## 2022-04-08 RX ORDER — ASPIRIN/CALCIUM CARB/MAGNESIUM 324 MG
325 TABLET ORAL
Refills: 0 | Status: DISCONTINUED | OUTPATIENT
Start: 2022-04-08 | End: 2022-04-09

## 2022-04-08 RX ORDER — SODIUM CHLORIDE 9 MG/ML
500 INJECTION, SOLUTION INTRAVENOUS ONCE
Refills: 0 | Status: COMPLETED | OUTPATIENT
Start: 2022-04-08 | End: 2022-04-08

## 2022-04-08 RX ORDER — MAGNESIUM HYDROXIDE 400 MG/1
30 TABLET, CHEWABLE ORAL DAILY
Refills: 0 | Status: DISCONTINUED | OUTPATIENT
Start: 2022-04-08 | End: 2022-04-09

## 2022-04-08 RX ORDER — INSULIN LISPRO 100/ML
VIAL (ML) SUBCUTANEOUS
Refills: 0 | Status: DISCONTINUED | OUTPATIENT
Start: 2022-04-08 | End: 2022-04-09

## 2022-04-08 RX ORDER — TRAMADOL HYDROCHLORIDE 50 MG/1
25 TABLET ORAL EVERY 8 HOURS
Refills: 0 | Status: DISCONTINUED | OUTPATIENT
Start: 2022-04-08 | End: 2022-04-09

## 2022-04-08 RX ORDER — SODIUM CHLORIDE 9 MG/ML
500 INJECTION, SOLUTION INTRAVENOUS ONCE
Refills: 0 | Status: COMPLETED | OUTPATIENT
Start: 2022-04-09 | End: 2022-04-09

## 2022-04-08 RX ORDER — ONDANSETRON 8 MG/1
4 TABLET, FILM COATED ORAL EVERY 6 HOURS
Refills: 0 | Status: DISCONTINUED | OUTPATIENT
Start: 2022-04-08 | End: 2022-04-09

## 2022-04-08 RX ORDER — PANTOPRAZOLE SODIUM 20 MG/1
40 TABLET, DELAYED RELEASE ORAL
Refills: 0 | Status: DISCONTINUED | OUTPATIENT
Start: 2022-04-08 | End: 2022-04-09

## 2022-04-08 RX ORDER — INSULIN LISPRO 100/ML
VIAL (ML) SUBCUTANEOUS AT BEDTIME
Refills: 0 | Status: DISCONTINUED | OUTPATIENT
Start: 2022-04-08 | End: 2022-04-09

## 2022-04-08 RX ORDER — GLUCAGON INJECTION, SOLUTION 0.5 MG/.1ML
1 INJECTION, SOLUTION SUBCUTANEOUS ONCE
Refills: 0 | Status: DISCONTINUED | OUTPATIENT
Start: 2022-04-08 | End: 2022-04-09

## 2022-04-08 RX ADMIN — Medication 975 MILLIGRAM(S): at 23:39

## 2022-04-08 RX ADMIN — Medication 325 MILLIGRAM(S): at 17:23

## 2022-04-08 RX ADMIN — Medication 100 MILLIGRAM(S): at 16:50

## 2022-04-08 RX ADMIN — SENNA PLUS 2 TABLET(S): 8.6 TABLET ORAL at 22:13

## 2022-04-08 RX ADMIN — OXYCODONE HYDROCHLORIDE 5 MILLIGRAM(S): 5 TABLET ORAL at 19:58

## 2022-04-08 RX ADMIN — TRAMADOL HYDROCHLORIDE 25 MILLIGRAM(S): 50 TABLET ORAL at 07:16

## 2022-04-08 RX ADMIN — OXYCODONE HYDROCHLORIDE 5 MILLIGRAM(S): 5 TABLET ORAL at 20:30

## 2022-04-08 RX ADMIN — Medication 75 MILLIGRAM(S): at 07:16

## 2022-04-08 RX ADMIN — Medication 975 MILLIGRAM(S): at 15:11

## 2022-04-08 RX ADMIN — SODIUM CHLORIDE 500 MILLILITER(S): 9 INJECTION, SOLUTION INTRAVENOUS at 13:36

## 2022-04-08 RX ADMIN — SODIUM CHLORIDE 75 MILLILITER(S): 9 INJECTION, SOLUTION INTRAVENOUS at 13:35

## 2022-04-08 RX ADMIN — Medication 15 MILLIGRAM(S): at 12:20

## 2022-04-08 RX ADMIN — Medication 1: at 16:49

## 2022-04-08 RX ADMIN — Medication 15 MILLIGRAM(S): at 12:00

## 2022-04-08 RX ADMIN — TRAMADOL HYDROCHLORIDE 25 MILLIGRAM(S): 50 TABLET ORAL at 13:36

## 2022-04-08 RX ADMIN — Medication 15 MILLIGRAM(S): at 17:22

## 2022-04-08 RX ADMIN — Medication 400 MILLIGRAM(S): at 07:16

## 2022-04-08 RX ADMIN — SODIUM CHLORIDE 500 MILLILITER(S): 9 INJECTION, SOLUTION INTRAVENOUS at 11:09

## 2022-04-08 RX ADMIN — ATORVASTATIN CALCIUM 80 MILLIGRAM(S): 80 TABLET, FILM COATED ORAL at 22:14

## 2022-04-08 RX ADMIN — PANTOPRAZOLE SODIUM 40 MILLIGRAM(S): 20 TABLET, DELAYED RELEASE ORAL at 07:15

## 2022-04-08 NOTE — DISCHARGE NOTE PROVIDER - NSDCCPTREATMENT_GEN_ALL_CORE_FT
PRINCIPAL PROCEDURE  Procedure: Right total knee arthroplasty  Findings and Treatment: See dictated operative note

## 2022-04-08 NOTE — DISCHARGE NOTE NURSING/CASE MANAGEMENT/SOCIAL WORK - NSDPDISTO_GEN_ALL_CORE
Pt. is afebrile and offers no complaints. In no acute distress. Right knee ace wrap dressing: clean, dry and intact. Pt is ambulating with a walker, tolerating diet well, and voiding in adequate amounts./Home with home care

## 2022-04-08 NOTE — DISCHARGE NOTE PROVIDER - NSDCMRMEDTOKEN_GEN_ALL_CORE_FT
aspirin 81 mg oral tablet: 1 tab(s) orally once a day last fernández 4/1/22  Calcium 600+D oral tablet: 1 tab(s) orally 2 times a day  diclofenac: last dose 4/1/22  Glucosamine Chondroitin oral capsule: 1 cap(s) orally once a day last dose 4/1/22  Januvia 100 mg oral tablet: 1 tab(s) orally once a day  Metoprolol Succinate ER 50 mg oral tablet, extended release: 1 tab(s) orally once a day  raloxifene 60 mg oral tablet: 1 tab(s) orally once a day  rosuvastatin 20 mg oral tablet: 1 tab(s) orally once a day  telmisartan 80 mg oral tablet: 1 tab(s) orally once a day  Vitamin C: 1 tab(s) orally once a day  vitamin E: 1 tab(s) orally once a day last dose 4/1/22

## 2022-04-08 NOTE — PATIENT PROFILE ADULT - FUNCTIONAL ASSESSMENT - DAILY ACTIVITY SCORE.
Mucosal Advancement Flap Text: Given the location of the defect, shape of the defect and the proximity to free margins a mucosal advancement flap was deemed most appropriate. Incisions were made with a 15 blade scalpel in the appropriate fashion along the cutaneous vermilion border and the mucosal lip. The remaining actinically damaged mucosal tissue was excised.  The mucosal advancement flap was then elevated to the gingival sulcus with care taken to preserve the neurovascular structures and advanced into the primary defect. Care was taken to ensure that precise realignment of the vermilion border was achieved. 20

## 2022-04-08 NOTE — DISCHARGE NOTE NURSING/CASE MANAGEMENT/SOCIAL WORK - NSDCPNINST_GEN_ALL_CORE
You have a post op appointment with Dr. Herring on April 22, 2022 @10:45am. If you are unable to keep this appointment, please call the office to reschedule. Call MD if you develop a fever, or if there is redness, swelling, drainage or pain not relieved by pain medication. No heavy lifting, bending, or straining to move your bowels. Take over the counter stool softeners as needed to prevent constipation which may be caused by pain medication. Your A1C= 7.2. Continue to follow a consistent carbohydrate diet and take your medications for diabetes.

## 2022-04-08 NOTE — PHYSICAL THERAPY INITIAL EVALUATION ADULT - ADDITIONAL COMMENTS
Pt lives with her  in a private home, 3 steps to enter with bilateral railings, pt's bedroom first floor and has no stairs to negotiate inside.  Pt's  has a rolling walker and cane from when he had his knee surgery about 4 years ago.

## 2022-04-08 NOTE — DISCHARGE NOTE PROVIDER - NSDCCPCAREPLAN_GEN_ALL_CORE_FT
PRINCIPAL DISCHARGE DIAGNOSIS  Diagnosis: Osteoarthrosis  Assessment and Plan of Treatment: Pt is a 77 y/o female history of right knee OA s/p elective RIGHT TOTAL KNEE ARTHOPLASTY with Dr. Herring on 4/8/2022 without any intraoperative complications.  Pt is doing well and stable for discharge.  Pt is tolerating physical therapy: WBAT with cane/walker if needed, gait training.  Leave dressing on until post op office visit (POD#14). Have sutures/staples removed POD#14 if present.  The patient had no post operative complications and clinically progressed faster than anticipated. The following medical conditions were active treated during the hospital stay: Diabetes, HTN. Patient was safely and appropriately discharged home. Pt is on enteric coated ASA 325mg PO BID for DVT prophylaxis, take for 6 weeks unless otherwise instructed by surgeon.  Follow up with Dr. Herring in two weeks. Follow up with primary care doctor in 1-2 weeks for continuity of care.   istop:

## 2022-04-08 NOTE — OCCUPATIONAL THERAPY INITIAL EVALUATION ADULT - PHYSICAL ASSIST/NONPHYSICAL ASSIST:DRESS LOWER BODY, OT EVAL
oriented to person, place and time, normal sensation, short and long term memory intact, sensory exam intact
verbal cues/nonverbal cues (demo/gestures)/1 person assist

## 2022-04-08 NOTE — OCCUPATIONAL THERAPY INITIAL EVALUATION ADULT - GENERAL OBSERVATIONS, REHAB EVAL
Patient received semisupine in bed in NAD and agreeable to participate in OT evaluation. Cleared by LORNE Womack. +IV.

## 2022-04-08 NOTE — DISCHARGE NOTE PROVIDER - HOSPITAL COURSE
Pt is a 77 y/o female history of right knee OA s/p elective RIGHT TOTAL KNEE ARTHOPLASTY with Dr. Herring on 4/8/2022 without any intraoperative complications.  Pt is doing well and stable for discharge.  Pt is tolerating physical therapy: WBAT with cane/walker if needed, gait training.  Leave dressing on until post op office visit (POD#14). Have sutures/staples removed POD#14 if present.  The patient had no post operative complications and clinically progressed faster than anticipated. The following medical conditions were active treated during the hospital stay: Diabetes, HTN. Patient was safely and appropriately discharged home. Pt is on enteric coated ASA 325mg PO BID for DVT prophylaxis, take for 6 weeks unless otherwise instructed by surgeon.  Follow up with Dr. Herring in two weeks. Follow up with primary care doctor in 1-2 weeks for continuity of care.   istop:

## 2022-04-08 NOTE — OCCUPATIONAL THERAPY INITIAL EVALUATION ADULT - ANTICIPATED DISCHARGE DISPOSITION, OT EVAL
Anticipating home with no OT needs following discharge. Patient demonstrates understanding of basic ADL and functional transfer skills. Patient is functionally cleared for discharge from OT perspective. OT to continue to follow while inpatient.

## 2022-04-08 NOTE — PATIENT PROFILE ADULT - FALL HARM RISK - RISK INTERVENTIONS
Assistance OOB with selected safe patient handling equipment/Assistance with ambulation/Communicate Fall Risk and Risk Factors to all staff, patient, and family/Discuss with provider need for PT consult/Monitor gait and stability/Provide patient with walking aids - walker, cane, crutches/Reinforce activity limits and safety measures with patient and family/Sit up slowly, dangle for a short time, stand at bedside before walking/Use of alarms - bed, chair and/or voice tab/Visual Cue: Yellow wristband/Bed in lowest position, wheels locked, appropriate side rails in place/Call bell, personal items and telephone in reach/Instruct patient to call for assistance before getting out of bed or chair/Non-slip footwear when patient is out of bed/[place] to call system/Physically safe environment - no spills, clutter or unnecessary equipment/Purposeful Proactive Rounding/Room/bathroom lighting operational, light cord in reach

## 2022-04-08 NOTE — PHYSICAL THERAPY INITIAL EVALUATION ADULT - ACTIVE RANGE OF MOTION EXAMINATION, REHAB EVAL
right ankle df/pf WFL; right hip flexion 0-95 degrees; right knee extension -10 degrees from neutral; right knee flexion AROM 75 degrees; AAROM 90 degrees/bilateral upper extremity Active ROM was WFL (within functional limits)/Left LE Active ROM was WFL (within functional limits)

## 2022-04-08 NOTE — DISCHARGE NOTE PROVIDER - CARE PROVIDER_API CALL
Tylor Herring)  Orthopaedic Sports Medicine; Orthopaedic Surgery  [address] [place] [address]  Phone: [phone]  Fax: [phone]  Follow Up Time: 2 weeks

## 2022-04-08 NOTE — OCCUPATIONAL THERAPY INITIAL EVALUATION ADULT - LIVES WITH, PROFILE
Patient lives in a private home with her  with 3 steps to enter. Patient reports moving her bed to the 1st floor.

## 2022-04-08 NOTE — DISCHARGE NOTE NURSING/CASE MANAGEMENT/SOCIAL WORK - PATIENT PORTAL LINK FT
You can access the FollowMyHealth Patient Portal offered by [facility] by registering at the following website: [email or web address]. By joining [facility]’s FollowMyHealth portal, you will also be able to view your health information using other applications (apps) compatible with our system.

## 2022-04-08 NOTE — DISCHARGE NOTE NURSING/CASE MANAGEMENT/SOCIAL WORK - NSDCPEFALRISK_GEN_ALL_CORE
For information on Fall & Injury Prevention, visit: [email or web address] OR  [email or web address] OR  https://www.cdc.gov/steadi/patient.html

## 2022-04-08 NOTE — DISCHARGE NOTE NURSING/CASE MANAGEMENT/SOCIAL WORK - NSDCPECAREGIVERED_GEN_ALL_CORE
diabetes  A1C  knee replacement  exercise worksheet  caring for my incision  pain management at home

## 2022-04-08 NOTE — PHYSICAL THERAPY INITIAL EVALUATION ADULT - PERTINENT HX OF CURRENT PROBLEM, REHAB EVAL
76 year old female with hx of right knee pain which has been worsening, dx with primary osteoarthritis right knee.

## 2022-04-09 VITALS
DIASTOLIC BLOOD PRESSURE: 50 MMHG | OXYGEN SATURATION: 100 % | HEART RATE: 65 BPM | SYSTOLIC BLOOD PRESSURE: 110 MMHG | TEMPERATURE: 98 F | RESPIRATION RATE: 17 BRPM

## 2022-04-09 LAB
ANION GAP SERPL CALC-SCNC: 13 MMOL/L — SIGNIFICANT CHANGE UP (ref 7–14)
BUN SERPL-MCNC: 25 MG/DL — HIGH (ref 7–23)
CALCIUM SERPL-MCNC: 8.7 MG/DL — SIGNIFICANT CHANGE UP (ref 8.4–10.5)
CHLORIDE SERPL-SCNC: 100 MMOL/L — SIGNIFICANT CHANGE UP (ref 98–107)
CO2 SERPL-SCNC: 20 MMOL/L — LOW (ref 22–31)
CREAT SERPL-MCNC: 1.25 MG/DL — SIGNIFICANT CHANGE UP (ref 0.5–1.3)
EGFR: 44 ML/MIN/1.73M2 — LOW
GLUCOSE BLDC GLUCOMTR-MCNC: 141 MG/DL — HIGH (ref 70–99)
GLUCOSE BLDC GLUCOMTR-MCNC: 171 MG/DL — HIGH (ref 70–99)
GLUCOSE SERPL-MCNC: 159 MG/DL — HIGH (ref 70–99)
HCT VFR BLD CALC: 33.3 % — LOW (ref 34.5–45)
HGB BLD-MCNC: 11.2 G/DL — LOW (ref 11.5–15.5)
MCHC RBC-ENTMCNC: 32.1 PG — SIGNIFICANT CHANGE UP (ref 27–34)
MCHC RBC-ENTMCNC: 33.6 GM/DL — SIGNIFICANT CHANGE UP (ref 32–36)
MCV RBC AUTO: 95.4 FL — SIGNIFICANT CHANGE UP (ref 80–100)
NRBC # BLD: 0 /100 WBCS — SIGNIFICANT CHANGE UP
NRBC # FLD: 0 K/UL — SIGNIFICANT CHANGE UP
PLATELET # BLD AUTO: 179 K/UL — SIGNIFICANT CHANGE UP (ref 150–400)
POTASSIUM SERPL-MCNC: 4.6 MMOL/L — SIGNIFICANT CHANGE UP (ref 3.5–5.3)
POTASSIUM SERPL-SCNC: 4.6 MMOL/L — SIGNIFICANT CHANGE UP (ref 3.5–5.3)
RBC # BLD: 3.49 M/UL — LOW (ref 3.8–5.2)
RBC # FLD: 11.7 % — SIGNIFICANT CHANGE UP (ref 10.3–14.5)
RH IG SCN BLD-IMP: POSITIVE — SIGNIFICANT CHANGE UP
SODIUM SERPL-SCNC: 133 MMOL/L — LOW (ref 135–145)
WBC # BLD: 12.71 K/UL — HIGH (ref 3.8–10.5)
WBC # FLD AUTO: 12.71 K/UL — HIGH (ref 3.8–10.5)

## 2022-04-09 RX ORDER — OXYCODONE HYDROCHLORIDE 5 MG/1
1 TABLET ORAL
Qty: 40 | Refills: 0
Start: 2022-04-09 | End: 2022-04-18

## 2022-04-09 RX ORDER — TRAMADOL HYDROCHLORIDE 50 MG/1
0.5 TABLET ORAL
Qty: 15 | Refills: 0
Start: 2022-04-09 | End: 2022-04-18

## 2022-04-09 RX ORDER — ACETAMINOPHEN 500 MG
3 TABLET ORAL
Qty: 0 | Refills: 0 | DISCHARGE
Start: 2022-04-09

## 2022-04-09 RX ORDER — OXYCODONE HYDROCHLORIDE 5 MG/1
1 TABLET ORAL
Qty: 28 | Refills: 0
Start: 2022-04-09 | End: 2022-04-15

## 2022-04-09 RX ORDER — TRAMADOL HYDROCHLORIDE 50 MG/1
1 TABLET ORAL
Qty: 21 | Refills: 0
Start: 2022-04-09 | End: 2022-04-15

## 2022-04-09 RX ORDER — ASPIRIN/CALCIUM CARB/MAGNESIUM 324 MG
1 TABLET ORAL
Qty: 60 | Refills: 0
Start: 2022-04-09 | End: 2022-05-08

## 2022-04-09 RX ADMIN — Medication 325 MILLIGRAM(S): at 05:16

## 2022-04-09 RX ADMIN — Medication 975 MILLIGRAM(S): at 08:40

## 2022-04-09 RX ADMIN — OXYCODONE HYDROCHLORIDE 5 MILLIGRAM(S): 5 TABLET ORAL at 06:25

## 2022-04-09 RX ADMIN — PANTOPRAZOLE SODIUM 40 MILLIGRAM(S): 20 TABLET, DELAYED RELEASE ORAL at 05:16

## 2022-04-09 RX ADMIN — Medication 100 MILLIGRAM(S): at 00:28

## 2022-04-09 RX ADMIN — Medication 15 MILLIGRAM(S): at 00:28

## 2022-04-09 RX ADMIN — Medication 50 MILLIGRAM(S): at 05:18

## 2022-04-09 RX ADMIN — OXYCODONE HYDROCHLORIDE 5 MILLIGRAM(S): 5 TABLET ORAL at 05:17

## 2022-04-09 RX ADMIN — Medication 1: at 11:37

## 2022-04-09 RX ADMIN — GABAPENTIN 100 MILLIGRAM(S): 400 CAPSULE ORAL at 05:15

## 2022-04-09 RX ADMIN — SODIUM CHLORIDE 1000 MILLILITER(S): 9 INJECTION, SOLUTION INTRAVENOUS at 05:15

## 2022-04-09 NOTE — PROGRESS NOTE ADULT - ATTENDING COMMENTS
Patient seen, examined.  A&O x 3.    Afeb, vss  Right knee bandages clean and dry.  No calf tenderness    OOB with PT today.  ASA for DVT prophylaxis  D/C home when cleared by PT

## 2022-04-09 NOTE — PROGRESS NOTE ADULT - ASSESSMENT
A/P: 76y y/o Female s/p R total knee arthroplasty, POD #0  -Pain control  -Antibiotic - Ancef postop  -Incentive Spirometry  -DVT prophylaxis: Venodynes/Aspirin 325mg BID  -F/U AM Labs  -PT/OT/WBAT  -Notify Orthopedics with any questions
A/P: 76y y/o Female s/p R total knee arthroplasty, POD #1  -Pain control  -Antibiotic - Ancef postop  -Incentive Spirometry  -DVT prophylaxis: Venodynes/Aspirin 325mg BID  -F/U AM Labs  -PT/OT/WBAT  -Notify Orthopedics with any questions

## 2022-04-09 NOTE — PROGRESS NOTE ADULT - SUBJECTIVE AND OBJECTIVE BOX
ORTHOPAEDICS DAILY PROGRESS NOTE:       SUBJECTIVE/ROS: POD 1. Seen and examined. Pain well controlled. Has been working with Pt. Denies CP/SOB/N/V.         MEDICATIONS  (STANDING):  acetaminophen     Tablet .. 975 milliGRAM(s) Oral every 8 hours  aspirin enteric coated 325 milliGRAM(s) Oral two times a day  atorvastatin 80 milliGRAM(s) Oral at bedtime  clindamycin IVPB 900 milliGRAM(s) IV Intermittent every 8 hours  dextrose 5%. 1000 milliLiter(s) (50 mL/Hr) IV Continuous <Continuous>  dextrose 5%. 1000 milliLiter(s) (100 mL/Hr) IV Continuous <Continuous>  dextrose 50% Injectable 25 Gram(s) IV Push once  dextrose 50% Injectable 12.5 Gram(s) IV Push once  dextrose 50% Injectable 25 Gram(s) IV Push once  glucagon  Injectable 1 milliGRAM(s) IntraMuscular once  HYDROmorphone  Injectable 0.5 milliGRAM(s) IV Push once  insulin lispro (ADMELOG) corrective regimen sliding scale   SubCutaneous three times a day before meals  insulin lispro (ADMELOG) corrective regimen sliding scale   SubCutaneous at bedtime  ketorolac   Injectable 15 milliGRAM(s) IV Push every 6 hours  lactated ringers. 1000 milliLiter(s) (75 mL/Hr) IV Continuous <Continuous>  lactated ringers. 1000 milliLiter(s) (30 mL/Hr) IV Continuous <Continuous>  losartan 100 milliGRAM(s) Oral daily  metoprolol succinate ER 50 milliGRAM(s) Oral daily  pantoprazole    Tablet 40 milliGRAM(s) Oral before breakfast  senna 2 Tablet(s) Oral at bedtime  traMADol 25 milliGRAM(s) Oral every 8 hours    MEDICATIONS  (PRN):  dextrose Oral Gel 15 Gram(s) Oral once PRN Blood Glucose LESS THAN 70 milliGRAM(s)/deciliter  magnesium hydroxide Suspension 30 milliLiter(s) Oral daily PRN Constipation  ondansetron Injectable 4 milliGRAM(s) IV Push every 6 hours PRN Nausea and/or Vomiting  oxyCODONE    IR 2.5 milliGRAM(s) Oral every 4 hours PRN Moderate Pain (4 - 6)  oxyCODONE    IR 5 milliGRAM(s) Oral every 4 hours PRN Severe Pain (7 - 10)      OBJECTIVE:    Vital Signs Last 24 Hrs  T(C): 36.4 (08 Apr 2022 22:15), Max: 36.6 (08 Apr 2022 06:42)  T(F): 97.6 (08 Apr 2022 22:15), Max: 97.9 (08 Apr 2022 06:42)  HR: 63 (08 Apr 2022 22:15) (63 - 76)  BP: 114/53 (08 Apr 2022 22:15) (101/52 - 135/64)  BP(mean): 80 (08 Apr 2022 12:45) (65 - 101)  RR: 16 (08 Apr 2022 22:15) (12 - 17)  SpO2: 100% (08 Apr 2022 22:15) (97% - 100%)    I&O's Detail    08 Apr 2022 07:01  -  09 Apr 2022 00:03  --------------------------------------------------------  IN:  Total IN: 0 mL    OUT:    Voided (mL): 800 mL  Total OUT: 800 mL    Total NET: -800 mL          Daily Height in cm: 148.59 (08 Apr 2022 06:42)    Daily     LABS:                        11.0   6.25  )-----------( 193      ( 08 Apr 2022 10:55 )             32.0     04-08    138  |  105  |  19  ----------------------------<  155<H>  4.4   |  22  |  1.15    Ca    8.5      08 Apr 2022 10:55                    PHYSICAL EXAM:  nad  nonlabored resp  rle  dressing c/d/i  +gastroc/ta/ehl/fhl  silt s/s/sp/dp/t  +dp  
Orthopedics Post-Op Check:  Patient was seen and examined at bedside. Denies CP/SOB/Dizziness/N/V/D/HA. Pain is well controlled at the moment.    Vital Signs Last 24 Hrs  T(C): 36.4 (08 Apr 2022 13:21), Max: 36.6 (08 Apr 2022 06:42)  T(F): 97.5 (08 Apr 2022 13:21), Max: 97.9 (08 Apr 2022 06:42)  HR: 64 (08 Apr 2022 13:21) (64 - 76)  BP: 135/64 (08 Apr 2022 13:21) (101/52 - 135/64)  BP(mean): 80 (08 Apr 2022 12:45) (65 - 101)  RR: 16 (08 Apr 2022 13:21) (12 - 17)  SpO2: 100% (08 Apr 2022 13:21) (97% - 100%)      Labs:                          11.0   6.25  )-----------( 193      ( 08 Apr 2022 10:55 )             32.0       04-08    138  |  105  |  19  ----------------------------<  155<H>  4.4   |  22  |  1.15    Ca    8.5      08 Apr 2022 10:55          Physical Exam:  Gen: NAD  R LE:   Dressing C/D/I  Motor intact 5/5 EHL/FHL/TA/GS. Sensation is grossly intact.   Compartments are soft, extremities are warm, DP 2+

## 2022-04-11 LAB — GLUCOSE BLDC GLUCOMTR-MCNC: 155 MG/DL — HIGH (ref 70–99)

## 2022-04-19 LAB — SURGICAL PATHOLOGY STUDY: SIGNIFICANT CHANGE UP

## 2022-04-22 ENCOUNTER — APPOINTMENT (OUTPATIENT)
Dept: ORTHOPEDIC SURGERY | Facility: CLINIC | Age: 77
End: 2022-04-22
Payer: MEDICARE

## 2022-04-22 PROCEDURE — 99024 POSTOP FOLLOW-UP VISIT: CPT

## 2022-05-18 ENCOUNTER — APPOINTMENT (OUTPATIENT)
Dept: ORTHOPEDIC SURGERY | Facility: CLINIC | Age: 77
End: 2022-05-18
Payer: MEDICARE

## 2022-05-18 VITALS — BODY MASS INDEX: 26.43 KG/M2 | WEIGHT: 140 LBS | HEIGHT: 61 IN

## 2022-05-18 PROCEDURE — 99024 POSTOP FOLLOW-UP VISIT: CPT

## 2022-05-18 NOTE — HISTORY OF PRESENT ILLNESS
[Clean/Dry/Intact] : clean, dry and intact [Neuro Intact] : an unremarkable neurological exam [Vascular Intact] : ~T peripheral vascular exam normal [Slow Progress] : is progressing slowly [___ Weeks Post Op] : [unfilled] weeks post op [Chills] : no chills [Fever] : no fever [Erythema] : not erythematous [Discharge] : absent of discharge [Dehiscence] : not dehisced [de-identified] : Right total knee replacement 4/8/22 [de-identified] : Patient presents using a walker for ambulation. She has been participating in PT. Her physical therapist brought to our attention that the patient has not been washing her surgical site and there is some focal redness and swelling at the incision line. She presents today as requested to have her knee examined. Denies drainage from surgical incision. [de-identified] : Right knee: Small focal area of erythema, pocket of subcutanous fluctuance proximal portion of surgical incision. No drainage. Able to perform a straight leg raise.  0-90 degrees knee flexion. No calf tenderness.  Ambulating with a walker for support. [de-identified] : Skin/subcutaneous tissue findings as noted above. Possible suture abscess. [de-identified] : Wound care - wash with soap and water daily. Cover site of aspiration with triple antibiotic and band aid.  Start on short course of Bactrim DS bid.  Continue PT for improving ROM/strength.\par Followup in 1 week.

## 2022-05-18 NOTE — PROCEDURE
[de-identified] : Right knee focal area of swelling/fluctuance skin site cleansed. Aspirated for droplets of blood.  Will be sent for gram stain and culture.

## 2022-05-23 ENCOUNTER — APPOINTMENT (OUTPATIENT)
Dept: ORTHOPEDIC SURGERY | Facility: CLINIC | Age: 77
End: 2022-05-23

## 2022-05-25 LAB — BACTERIA FLD CULT: ABNORMAL

## 2022-05-26 ENCOUNTER — APPOINTMENT (OUTPATIENT)
Dept: ORTHOPEDIC SURGERY | Facility: CLINIC | Age: 77
End: 2022-05-26
Payer: MEDICARE

## 2022-05-26 VITALS — WEIGHT: 140 LBS | TEMPERATURE: 97.8 F | HEIGHT: 61 IN | BODY MASS INDEX: 26.43 KG/M2

## 2022-05-26 PROCEDURE — 99024 POSTOP FOLLOW-UP VISIT: CPT

## 2022-06-01 ENCOUNTER — APPOINTMENT (OUTPATIENT)
Dept: ORTHOPEDIC SURGERY | Facility: CLINIC | Age: 77
End: 2022-06-01
Payer: MEDICARE

## 2022-06-01 VITALS — HEIGHT: 61 IN | BODY MASS INDEX: 26.43 KG/M2 | WEIGHT: 140 LBS

## 2022-06-01 PROCEDURE — 99024 POSTOP FOLLOW-UP VISIT: CPT

## 2022-06-23 ENCOUNTER — APPOINTMENT (OUTPATIENT)
Dept: ORTHOPEDIC SURGERY | Facility: CLINIC | Age: 77
End: 2022-06-23
Payer: MEDICARE

## 2022-06-23 VITALS — HEIGHT: 61 IN | WEIGHT: 140 LBS | BODY MASS INDEX: 26.43 KG/M2

## 2022-06-23 PROCEDURE — 99024 POSTOP FOLLOW-UP VISIT: CPT

## 2022-06-23 PROCEDURE — 73562 X-RAY EXAM OF KNEE 3: CPT | Mod: RT

## 2022-06-24 LAB
B PERT IGG+IGM PNL SER: ABNORMAL
COLOR FLD: NORMAL
CRP SERPL-MCNC: 30 MG/L
ERYTHROCYTE [SEDIMENTATION RATE] IN BLOOD BY WESTERGREN METHOD: 94 MM/HR
FLUID INTAKE SUBSTANCE CLASS: NORMAL
NEUTS SEG # FLD MANUAL: NORMAL
RBC # FLD MANUAL: ABNORMAL /UL
TOTAL CELLS COUNTED FLD: 1170 /UL
TUBE TYPE: NORMAL

## 2022-06-29 ENCOUNTER — APPOINTMENT (OUTPATIENT)
Dept: ORTHOPEDIC SURGERY | Facility: CLINIC | Age: 77
End: 2022-06-29

## 2022-06-29 VITALS
SYSTOLIC BLOOD PRESSURE: 167 MMHG | BODY MASS INDEX: 26.43 KG/M2 | TEMPERATURE: 98.2 F | DIASTOLIC BLOOD PRESSURE: 90 MMHG | HEIGHT: 61 IN | WEIGHT: 140 LBS | HEART RATE: 100 BPM

## 2022-06-29 PROCEDURE — 99024 POSTOP FOLLOW-UP VISIT: CPT

## 2022-07-06 ENCOUNTER — APPOINTMENT (OUTPATIENT)
Dept: ORTHOPEDIC SURGERY | Facility: CLINIC | Age: 77
End: 2022-07-06

## 2022-07-06 VITALS
DIASTOLIC BLOOD PRESSURE: 80 MMHG | TEMPERATURE: 98 F | HEART RATE: 97 BPM | BODY MASS INDEX: 26.43 KG/M2 | SYSTOLIC BLOOD PRESSURE: 153 MMHG | WEIGHT: 140 LBS | HEIGHT: 61 IN

## 2022-07-06 PROCEDURE — 99024 POSTOP FOLLOW-UP VISIT: CPT

## 2022-07-08 LAB — BACTERIA FLD CULT: NORMAL

## 2022-07-27 ENCOUNTER — APPOINTMENT (OUTPATIENT)
Dept: ORTHOPEDIC SURGERY | Facility: CLINIC | Age: 77
End: 2022-07-27

## 2022-07-27 VITALS
DIASTOLIC BLOOD PRESSURE: 81 MMHG | SYSTOLIC BLOOD PRESSURE: 149 MMHG | WEIGHT: 140 LBS | HEIGHT: 61 IN | BODY MASS INDEX: 26.43 KG/M2 | HEART RATE: 85 BPM

## 2022-07-27 PROCEDURE — 99213 OFFICE O/P EST LOW 20 MIN: CPT

## 2022-08-15 ENCOUNTER — APPOINTMENT (OUTPATIENT)
Dept: ORTHOPEDIC SURGERY | Facility: CLINIC | Age: 77
End: 2022-08-15

## 2022-08-15 VITALS — TEMPERATURE: 98 F

## 2022-08-15 PROCEDURE — 73562 X-RAY EXAM OF KNEE 3: CPT | Mod: RT

## 2022-08-15 PROCEDURE — 99212 OFFICE O/P EST SF 10 MIN: CPT

## 2022-08-15 NOTE — PHYSICAL EXAM
[de-identified] : Digital temperature is 98 degrees [de-identified] : Walking with a 4 pronged cane.  Right knee: Mild soft tissue fullness right knee.  No erythema.  Skin intact.  Able to perform a right lower extremity straight leg raise.  0 to 90 degrees right knee flexion.  No calf tenderness. [de-identified] : X-rays right knee AP, lateral and patella sunrise views taken in the office today demonstrate stable cemented total knee replacement.  No periprosthetic fractures.

## 2022-08-15 NOTE — HISTORY OF PRESENT ILLNESS
[4] : a current pain level of 4/10 [de-identified] : 76 year old female presents status post right total knee replacement 4/8/22. Over the past few days she feels her knee has become swollen and more painful with walking and bending her knee. She took tramadol to help her sleep. Denies injury.

## 2022-08-15 NOTE — DISCUSSION/SUMMARY
[de-identified] : Discussion had with the patient.  Recommend course of anti-inflammatories to reduce joint discomfort/swelling.  Meloxicam prescribed.  Continue to monitor closely.  Advised on application of ice to the knee to help reduce joint pain and swelling as well.

## 2022-08-17 ENCOUNTER — APPOINTMENT (OUTPATIENT)
Dept: ORTHOPEDIC SURGERY | Facility: CLINIC | Age: 77
End: 2022-08-17

## 2022-08-17 VITALS
WEIGHT: 140 LBS | BODY MASS INDEX: 26.43 KG/M2 | SYSTOLIC BLOOD PRESSURE: 160 MMHG | HEART RATE: 72 BPM | HEIGHT: 61 IN | TEMPERATURE: 97.9 F | DIASTOLIC BLOOD PRESSURE: 71 MMHG

## 2022-08-17 PROCEDURE — 20610 DRAIN/INJ JOINT/BURSA W/O US: CPT

## 2022-08-17 PROCEDURE — 99213 OFFICE O/P EST LOW 20 MIN: CPT | Mod: 25

## 2022-08-23 ENCOUNTER — APPOINTMENT (OUTPATIENT)
Dept: ORTHOPEDIC SURGERY | Facility: CLINIC | Age: 77
End: 2022-08-23

## 2022-08-23 VITALS
HEIGHT: 61 IN | SYSTOLIC BLOOD PRESSURE: 176 MMHG | HEART RATE: 96 BPM | BODY MASS INDEX: 25.49 KG/M2 | DIASTOLIC BLOOD PRESSURE: 74 MMHG | WEIGHT: 135 LBS

## 2022-08-23 LAB
CRP SERPL-MCNC: 38 MG/L
ERYTHROCYTE [SEDIMENTATION RATE] IN BLOOD BY WESTERGREN METHOD: 57 MM/HR

## 2022-08-23 PROCEDURE — 99215 OFFICE O/P EST HI 40 MIN: CPT

## 2022-08-23 NOTE — PHYSICAL EXAM
[de-identified] : Patient is well nourished, well-developed, in no acute distress, with appropriate mood and affect. The patient is oriented to time, place, and person. Respirations are even and unlabored. Gait evaluation does reveal a limp. There is no inguinal adenopathy. Examination of the contralateral knee shows normal range of motion, strength, no tenderness, and intact skin. The affected limb is well-perfused, without skin lesions, shows a grossly normal motor and sensory examination. Knee motion is significantly reduced and does cause significant pain. The knee moves from 0-90 degrees. The knee is stable within that range-of-motion to AP and ML stress. The alignment of the knee is neutral.  Well-healed midline surgical scar.. Muscle strength is normal. Pedal pulses are palpable. Hip examination was negative.\par  [de-identified] : AP, lateral, tunnel, and sunrise knee x-rays of the right knee were brought in by the patient which I reviewed and demonstrate satisfactory position and alignment of the components are present. No signs of loosening are seen.

## 2022-08-23 NOTE — HISTORY OF PRESENT ILLNESS
[de-identified] : This is very nice 76-year-old female experiencing right knee pain, which is severe in intensity.  She underwent a right total knee arthroplasty April 8, 2022 by Dr. Herring @[facility].  Initial recovery was uncomplicated however she was complicated by wound breakdown approximately treat with Bactrim for MSSA positive culture.  The wound did heal but her knee has never improved since surgery.  Her knee was aspirated by her surgeon July 8, 2022 which showed a cell count 1170/no growth.  Differential not performed.  ESR 94, CRP 30.  Last weeks and no real fluid aspirated again of approximately 1 cc of fluid and this was sent for alpha defense and testing which was negative but Synovasure deferred neutrophil elastase was positive and synovial CRP 26.  The pain substantially limits activities of daily living. Walking tolerance is reduced. Medication including meloxicam and activity modification have been minimally effective for a period lasting greater than three months in duration. Assistive devices and external support were not deemed by the patient to be helpful in improving their function. Due to the severity of osteoarthritis and level of pain, physical therapy is contraindicated. Pain and restriction of function are intolerable at this time. The patient denies any radiation of the pain to the feet and it is not associated with numbness, tingling, or weakness. Volar Splint

## 2022-08-26 ENCOUNTER — NON-APPOINTMENT (OUTPATIENT)
Age: 77
End: 2022-08-26

## 2022-08-30 ENCOUNTER — NON-APPOINTMENT (OUTPATIENT)
Age: 77
End: 2022-08-30

## 2022-09-01 ENCOUNTER — APPOINTMENT (OUTPATIENT)
Dept: INFECTIOUS DISEASE | Facility: CLINIC | Age: 77
End: 2022-09-01

## 2022-09-01 VITALS
HEART RATE: 103 BPM | BODY MASS INDEX: 25.3 KG/M2 | SYSTOLIC BLOOD PRESSURE: 145 MMHG | OXYGEN SATURATION: 98 % | DIASTOLIC BLOOD PRESSURE: 86 MMHG | HEIGHT: 61 IN | TEMPERATURE: 99 F | WEIGHT: 134 LBS

## 2022-09-01 PROCEDURE — 99203 OFFICE O/P NEW LOW 30 MIN: CPT

## 2022-09-02 LAB
ALBUMIN SERPL ELPH-MCNC: 4 G/DL
ALP BLD-CCNC: 67 U/L
ALT SERPL-CCNC: 9 U/L
ANION GAP SERPL CALC-SCNC: 13 MMOL/L
AST SERPL-CCNC: 18 U/L
BASOPHILS # BLD AUTO: 0.09 K/UL
BASOPHILS NFR BLD AUTO: 0.9 %
BILIRUB SERPL-MCNC: 0.3 MG/DL
BUN SERPL-MCNC: 19 MG/DL
CALCIUM SERPL-MCNC: 9.2 MG/DL
CHLORIDE SERPL-SCNC: 96 MMOL/L
CO2 SERPL-SCNC: 25 MMOL/L
CREAT SERPL-MCNC: 1.34 MG/DL
EGFR: 41 ML/MIN/1.73M2
EOSINOPHIL # BLD AUTO: 0.16 K/UL
EOSINOPHIL NFR BLD AUTO: 1.6 %
ERYTHROCYTE [SEDIMENTATION RATE] IN BLOOD BY WESTERGREN METHOD: 99 MM/HR
GLUCOSE SERPL-MCNC: 199 MG/DL
HCT VFR BLD CALC: 33.9 %
HGB BLD-MCNC: 10.1 G/DL
IMM GRANULOCYTES NFR BLD AUTO: 0.5 %
INR PPP: 1.03 RATIO
LYMPHOCYTES # BLD AUTO: 2.28 K/UL
LYMPHOCYTES NFR BLD AUTO: 22.7 %
MAN DIFF?: NORMAL
MCHC RBC-ENTMCNC: 26.9 PG
MCHC RBC-ENTMCNC: 29.8 GM/DL
MCV RBC AUTO: 90.2 FL
MONOCYTES # BLD AUTO: 0.78 K/UL
MONOCYTES NFR BLD AUTO: 7.8 %
NEUTROPHILS # BLD AUTO: 6.67 K/UL
NEUTROPHILS NFR BLD AUTO: 66.5 %
PLATELET # BLD AUTO: 407 K/UL
POTASSIUM SERPL-SCNC: 4 MMOL/L
PROT SERPL-MCNC: 8.9 G/DL
PT BLD: 11.9 SEC
RBC # BLD: 3.76 M/UL
RBC # FLD: 15.5 %
SODIUM SERPL-SCNC: 134 MMOL/L
WBC # FLD AUTO: 10.03 K/UL

## 2022-09-02 NOTE — PHYSICAL EXAM
[General Appearance - Alert] : alert [General Appearance - In No Acute Distress] : in no acute distress [Sclera] : the sclera and conjunctiva were normal [PERRL With Normal Accommodation] : pupils were equal in size, round, reactive to light [Extraocular Movements] : extraocular movements were intact [Outer Ear] : the ears and nose were normal in appearance [Oropharynx] : the oropharynx was normal with no thrush [Neck Appearance] : the appearance of the neck was normal [Neck Cervical Mass (___cm)] : no neck mass was observed [Jugular Venous Distention Increased] : there was no jugular-venous distention [Thyroid Diffuse Enlargement] : the thyroid was not enlarged [Auscultation Breath Sounds / Voice Sounds] : lungs were clear to auscultation bilaterally [Heart Sounds] : normal S1 and S2 [Full Pulse] : the pedal pulses are present [Edema] : there was no peripheral edema [Bowel Sounds] : normal bowel sounds [Abdomen Soft] : soft [Abdomen Tenderness] : non-tender [Abdomen Mass (___ Cm)] : no abdominal mass palpated [Costovertebral Angle Tenderness] : no CVA tenderness [FreeTextEntry1] : right knee incision intact, some swelling, no redness, some warmth, decreased ROM  [Skin Color & Pigmentation] : normal skin color and pigmentation [] : no rash [No Focal Deficits] : no focal deficits [Oriented To Time, Place, And Person] : oriented to person, place, and time [Affect] : the affect was normal

## 2022-09-02 NOTE — CONSULT LETTER
[Dear  ___] : Dear  [unfilled], [Consult Letter:] : I had the pleasure of evaluating your patient, [unfilled]. [( Thank you for referring [unfilled] for consultation for _____ )] : Thank you for referring [unfilled] for consultation for [unfilled] [Please see my note below.] : Please see my note below. [Consult Closing:] : Thank you very much for allowing me to participate in the care of this patient.  If you have any questions, please do not hesitate to contact me. [Sincerely,] : Sincerely, [FreeTextEntry2] : Tylor Herring MD  [FreeTextEntry3] : John Holley\par Attending Physician\par Infectious Disease\par [facility]\par [facility]\par [address]\[place] [address]\par Tel: [phone]\par Fax: [phone]\par Email: [email or web address] \par

## 2022-09-02 NOTE — ASSESSMENT
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 She is currently refusing surgery and wants to try abx.\par \par I explained the optimal way to treat this kind of infection is with surgery + abx and she is at risk for worsening infection, sepsis, death. \par \par She would like to try abx first.\par \par I offered options of IV vs PO abx. I explain IV is the better option and then she would need lifelong oral abx suppression.\par \par Risks of abx explained including GI, resistance, cdiff, allergy, rash etc. \par \par RIsk of picc would be bleeding, infection, clot.\par \par D/w pt and daughter (physician) over phone. \par \par Will check labs below. They will discuss and let me know what their decision is.

## 2022-09-02 NOTE — HISTORY OF PRESENT ILLNESS
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 \par \par Had some superficial drainage and cx showed MSSA. \par \par No fever. No more drainage. Joint tap cultures have been negative. \par \par Not on abx currently. \par

## 2022-09-07 LAB
BACTERIA BLD CULT: NORMAL
BACTERIA BLD CULT: NORMAL

## 2022-09-08 ENCOUNTER — APPOINTMENT (OUTPATIENT)
Dept: ORTHOPEDIC SURGERY | Facility: CLINIC | Age: 77
End: 2022-09-08

## 2022-09-08 VITALS
WEIGHT: 134 LBS | BODY MASS INDEX: 25.3 KG/M2 | SYSTOLIC BLOOD PRESSURE: 161 MMHG | HEIGHT: 61 IN | DIASTOLIC BLOOD PRESSURE: 86 MMHG | HEART RATE: 77 BPM | TEMPERATURE: 97.9 F

## 2022-09-08 PROCEDURE — 99213 OFFICE O/P EST LOW 20 MIN: CPT

## 2022-09-12 ENCOUNTER — NON-APPOINTMENT (OUTPATIENT)
Age: 77
End: 2022-09-12

## 2022-09-13 ENCOUNTER — APPOINTMENT (OUTPATIENT)
Dept: ORTHOPEDIC SURGERY | Facility: CLINIC | Age: 77
End: 2022-09-13

## 2022-09-13 ENCOUNTER — OUTPATIENT (OUTPATIENT)
Dept: OUTPATIENT SERVICES | Facility: HOSPITAL | Age: 77
LOS: 1 days | End: 2022-09-13
Payer: MEDICARE

## 2022-09-13 VITALS — BODY MASS INDEX: 25.3 KG/M2 | HEIGHT: 61 IN | WEIGHT: 134 LBS

## 2022-09-13 DIAGNOSIS — Z96.651 PRESENCE OF RIGHT ARTIFICIAL KNEE JOINT: Chronic | ICD-10-CM

## 2022-09-13 DIAGNOSIS — Z90.49 ACQUIRED ABSENCE OF OTHER SPECIFIED PARTS OF DIGESTIVE TRACT: Chronic | ICD-10-CM

## 2022-09-13 DIAGNOSIS — Z11.52 ENCOUNTER FOR SCREENING FOR COVID-19: ICD-10-CM

## 2022-09-13 LAB — SARS-COV-2 RNA SPEC QL NAA+PROBE: SIGNIFICANT CHANGE UP

## 2022-09-13 PROCEDURE — 99215 OFFICE O/P EST HI 40 MIN: CPT

## 2022-09-13 NOTE — PHYSICAL EXAM
[de-identified] : Patient is well nourished, well-developed, in no acute distress, with appropriate mood and affect. The patient is oriented to time, place, and person. Respirations are even and unlabored. Gait evaluation does reveal a limp. There is no inguinal adenopathy. Examination of the contralateral knee shows normal range of motion, strength, no tenderness, and intact skin. The affected limb is well-perfused, without skin lesions, shows a grossly normal motor and sensory examination. Knee motion is significantly reduced and does cause significant pain. The knee moves from 0-80 degrees. The knee is stable within that range-of-motion to AP and ML stress. The alignment of the knee is neutral.  Well-healed midline surgical scar.. Muscle strength is normal. Pedal pulses are palpable. Hip examination was negative.\par

## 2022-09-13 NOTE — DISCUSSION/SUMMARY
[de-identified] : This patient has a painful right total knee arthroplasty.  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  Continue bactrim and pending IV antibiotics after PICC later this week.  She has failed a course of nonoperative management and would like to proceed with a right total knee arthroplasty explantation, irrigation debridement and placement of a functional revision total knee arthroplasty as a spacer versus a two-stage revision total knee arthroplasty with a static or articulating antibiotic spacer.  This will be an intraoperative decision.  She understands that the chance that we can clear this infection is approximately 70% given that we cannot preoperatively identify the organism.\par \par The patient is an appropriate candidate for consideration of right revision total knee arthroplasty as a functional antibiotic spacer versus a two-stage antibiotic spacer which may be a static spacer or a articulating spacer. This recommendation is based on the patient's pain, function, and bone stock. An extensive discussion was conducted of the natural history of this particular problem and the variety of surgical and non-surgical treatment options available to the patient. A risk/benefit analysis was discussed with the patient reviewing the advantages and disadvantages of surgical intervention at this time. A full explanation was given of the nature and the purpose of the procedure and anesthesia, its benefits, possible alternative methods of diagnosis or treatment, the risks involved, the possibility of complications, the foreseeable consequences of the procedure and the possible results of the non-treatment. I reviewed the plan of care and I also used a model of a revision joint replacement implant equivalent to the one that will be used for their revision total joint replacement. The ability to secure the implant utilizing cement or cementless (press-fit) was discussed with the patient. The patient agrees with the plan of care, as well as the use of implants for their revision joint replacement.  She understands that if a cement spacer is utilized to left modulate her weightbearing will have limited range of motion of her knee while the spacer is in place and will require multiple future operations.\par \par No guarantee or assurance was made as to the results that may be obtained. Specifically, the risks were identified to include, but are not limited to the following: Infection, phlebitis, pulmonary embolism, death, paralysis, dislocation, pain, stiffness, instability, limp, weakness, breakage, leg-length inequality, uncontrolled bleeding, nerve injury, blood vessel injury, pressure sores, anesthetic risks, delayed healing of wound and bone, and wear and loosening. Further discussion was undertaken with the patient about the details of surgical preparation, treatment, and postoperative rehabilitation including medical clearance, autotransfusion, the hospital course, and the postoperative rehabilitation involved. Reimplantation may require cemented or cementless components, or both, depending upon a variety of factors that must be assessed at the time of surgery. The need for bone graft (either autograft or allograft) to enhance the chance for success of the procedure(s) was discussed. All in all, I feel that this patient is a good candidate for surgical reconstruction.

## 2022-09-13 NOTE — HISTORY OF PRESENT ILLNESS
[de-identified] : This is very nice 76-year-old female experiencing right knee pain, which is severe in intensity. Has a R TKA PJI. Pain and symptoms worsening. not clinically sick now. She underwent a right total knee arthroplasty April 8, 2022 by Dr. Herring @[facility].  Initial recovery was uncomplicated however she was complicated by wound breakdown approximately treat with Bactrim for MSSA positive culture.  The wound did heal but her knee has never improved since surgery.  Her knee was aspirated by her surgeon July 8, 2022 which showed a cell count 1170/no growth.  Differential not performed.  ESR 94, CRP 30.  Last weeks and no real fluid aspirated again of approximately 1 cc of fluid and this was sent for alpha defense and testing which was negative but Synovasure deferred neutrophil elastase was positive and synovial CRP 26.  The pain substantially limits activities of daily living. Walking tolerance is reduced. Medication including meloxicam and activity modification have been minimally effective for a period lasting greater than three months in duration. Assistive devices and external support were not deemed by the patient to be helpful in improving their function. Due to the severity of osteoarthritis and level of pain, physical therapy is contraindicated. Pain and restriction of function are intolerable at this time. The patient denies any radiation of the pain to the feet and it is not associated with numbness, tingling, or weakness. On bactrim now. Pending PICC later this week.

## 2022-09-14 ENCOUNTER — APPOINTMENT (OUTPATIENT)
Dept: ORTHOPEDIC SURGERY | Facility: CLINIC | Age: 77
End: 2022-09-14

## 2022-09-15 ENCOUNTER — OUTPATIENT (OUTPATIENT)
Dept: OUTPATIENT SERVICES | Facility: HOSPITAL | Age: 77
LOS: 1 days | End: 2022-09-15
Payer: MEDICARE

## 2022-09-15 ENCOUNTER — TRANSCRIPTION ENCOUNTER (OUTPATIENT)
Age: 77
End: 2022-09-15

## 2022-09-15 ENCOUNTER — RESULT REVIEW (OUTPATIENT)
Age: 77
End: 2022-09-15

## 2022-09-15 VITALS
DIASTOLIC BLOOD PRESSURE: 80 MMHG | TEMPERATURE: 98 F | RESPIRATION RATE: 16 BRPM | HEART RATE: 67 BPM | WEIGHT: 134.92 LBS | HEIGHT: 61 IN | SYSTOLIC BLOOD PRESSURE: 176 MMHG | OXYGEN SATURATION: 100 %

## 2022-09-15 DIAGNOSIS — Z96.651 PRESENCE OF RIGHT ARTIFICIAL KNEE JOINT: Chronic | ICD-10-CM

## 2022-09-15 DIAGNOSIS — Z90.49 ACQUIRED ABSENCE OF OTHER SPECIFIED PARTS OF DIGESTIVE TRACT: Chronic | ICD-10-CM

## 2022-09-15 DIAGNOSIS — T84.53XD INFECTION AND INFLAMMATORY REACTION DUE TO INTERNAL RIGHT KNEE PROSTHESIS, SUBSEQUENT ENCOUNTER: ICD-10-CM

## 2022-09-15 PROCEDURE — 36573 INSJ PICC RS&I 5 YR+: CPT

## 2022-09-15 PROCEDURE — 36573 INSJ PICC RS&I 5 YR+: CPT | Mod: RT

## 2022-09-15 NOTE — ASU DISCHARGE PLAN (ADULT/PEDIATRIC) - NURSING INSTRUCTIONS
Please feel free to contact us at [phone] if any problems arise. After 6PM, Monday through Friday, on weekends and on holidays, please call [phone] and ask for the radiology resident on call to be paged.

## 2022-09-15 NOTE — ASU PATIENT PROFILE, ADULT - FALL HARM RISK - UNIVERSAL INTERVENTIONS
Bed in lowest position, wheels locked, appropriate side rails in place/Call bell, personal items and telephone in reach/Instruct patient to call for assistance before getting out of bed or chair/Non-slip footwear when patient is out of bed/[place] to call system/Physically safe environment - no spills, clutter or unnecessary equipment/Purposeful Proactive Rounding/Room/bathroom lighting operational, light cord in reach

## 2022-09-15 NOTE — ASU PATIENT PROFILE, ADULT - PATIENT KNOW
pt 9 weeks pregnant c/o nausea/vomiting and abdominal pain  daughter seen in ED yesterday morning for food poisoning
yes

## 2022-09-15 NOTE — PRE PROCEDURE NOTE - PRE PROCEDURE EVALUATION
Interventional Radiology    HPI: 76y Female with hx of total knee replacement here for PICC placement for IV abx.          Allergies: penicillins (Short breath (Severe))    Medications (Abx/Cardiac/Anticoagulation/Blood Products)      Data:  154.9  61.2  T(C): 36.6  HR: 67  BP: 176/80  RR: 16  SpO2: 100%    Infection and inflammatory reaction due to internal right knee prosthesis, subsequent encounter    No pertinent family history in first degree relatives    Handoff    HTN (hypertension)    Diabetes mellitus    Hyperlipidemia    No significant past surgical history    History of cholecystectomy    SysAdmin_VstLnk        Exam  General: No acute distress  Chest: Non labored breathing          Imaging:     Plan: 76y Female presents for PICC placement   -Risks/Benefits/alternatives explained with the patient and/or healthcare proxy and witnessed informed consent obtained.

## 2022-09-15 NOTE — ASU DISCHARGE PLAN (ADULT/PEDIATRIC) - NS MD DC FALL RISK RISK
For information on Fall & Injury Prevention, visit: [email or web address] OR  [email or web address] OR  https://www.cdc.gov/steadi/patient.html

## 2022-09-15 NOTE — ASU DISCHARGE PLAN (ADULT/PEDIATRIC) - ASU DC SPECIAL INSTRUCTIONSFT
PICC Placement    Discharge Instructions  - You have had a PICC implanted in your arm.   - The PICC is ready for use.  - You may shower as long as the PICC and dressing remains dry.  -  No soaking or swimming until the PICC is removed or when the site is completely healed.  - Keep the area covered and dry for as long as the PICC remains in. It may be removed and changed by a nurse as needed.  - Do not perform any heavy lifting or put tension on the area for the next week or until the site is healed.  - You may resume your normal diet.  - You may resume your normal medications however you should wait 48 hours before restarting aspirin, plavix, or blood thinners.  - It is normal to experience some pain over the site for the next few days. You may take apply ice to the area (20 minutes on, 20 minutes off) and take Tylenol for that pain. Do not take more frequently than every 6 hours and do not exceed more than 3000mg of Tylenol in a 24 hour period.    Notify your primary physician and/or Interventional Radiology IMMEDIATELY if you experience any of the following       - Fever of 101F or 38C       - Chills or Rigors/ Shakes       - Swelling and/or Redness in the area around the port       - Worsening Pain       - Blood soaked bandages or worsening bleeding       - Lightheadedness and/or dizziness upon standing       - Chest Pain/ Tightness       - Shortness of Breath       - Difficulty walking    If you have a problem that you believe requires IMMEDIATE attention, please go to your NEAREST Emergency Room. If you believe your problem can safely wait until you speak to a physician, please call Interventional Radiology for any concerns.    Please feel free to contact us at [phone] if any problems arise. After 6PM, Monday through Friday, on weekends and on holidays, please call [phone] and ask for the radiology resident on call to be paged.

## 2022-09-19 ENCOUNTER — NON-APPOINTMENT (OUTPATIENT)
Age: 77
End: 2022-09-19

## 2022-09-20 ENCOUNTER — NON-APPOINTMENT (OUTPATIENT)
Age: 77
End: 2022-09-20

## 2022-09-29 ENCOUNTER — APPOINTMENT (OUTPATIENT)
Dept: ORTHOPEDIC SURGERY | Facility: CLINIC | Age: 77
End: 2022-09-29

## 2022-09-29 DIAGNOSIS — Z45.2 ENCOUNTER FOR ADJUSTMENT AND MANAGEMENT OF VASCULAR ACCESS DEVICE: ICD-10-CM

## 2022-09-29 DIAGNOSIS — Z96.659 PRESENCE OF UNSPECIFIED ARTIFICIAL KNEE JOINT: ICD-10-CM

## 2022-10-08 ENCOUNTER — EMERGENCY (EMERGENCY)
Facility: HOSPITAL | Age: 77
LOS: 1 days | Discharge: HOME | End: 2022-10-08
Attending: EMERGENCY MEDICINE | Admitting: EMERGENCY MEDICINE

## 2022-10-08 VITALS
DIASTOLIC BLOOD PRESSURE: 76 MMHG | HEART RATE: 100 BPM | HEIGHT: 61 IN | OXYGEN SATURATION: 100 % | RESPIRATION RATE: 18 BRPM | SYSTOLIC BLOOD PRESSURE: 172 MMHG | TEMPERATURE: 98 F

## 2022-10-08 VITALS — HEART RATE: 95 BPM

## 2022-10-08 DIAGNOSIS — Z96.651 PRESENCE OF RIGHT ARTIFICIAL KNEE JOINT: Chronic | ICD-10-CM

## 2022-10-08 DIAGNOSIS — Z90.49 ACQUIRED ABSENCE OF OTHER SPECIFIED PARTS OF DIGESTIVE TRACT: Chronic | ICD-10-CM

## 2022-10-08 PROCEDURE — 99283 EMERGENCY DEPT VISIT LOW MDM: CPT

## 2022-10-08 RX ORDER — DIPHENHYDRAMINE HCL 50 MG
25 CAPSULE ORAL ONCE
Refills: 0 | Status: COMPLETED | OUTPATIENT
Start: 2022-10-08 | End: 2022-10-08

## 2022-10-08 RX ADMIN — Medication 25 MILLIGRAM(S): at 04:23

## 2022-10-08 NOTE — ED PROVIDER NOTE - NS ED ATTENDING STATEMENT MOD
Please inform patient that her CT abdomen and pelvis did not report any acute abnormalities.  Postop changes related to ventral hernia repair was noted.  Stable right lower lung nodule was reported, likely benign.  If patient is continuing to experience abdominal pain, I will refer to GI for an evaluation.  Please let me know.
Attending with

## 2022-10-08 NOTE — ED PROVIDER NOTE - ATTENDING CONTRIBUTION TO CARE
75yo F with HTN, HLD, pre-diabetes, R knee replacement, currently on Vancomycin via PICC line for many weeks for R knee septic arthritis, p/w pruritic red rash on all extremities since this past evening with no sob, vomiting/diarrhea, fevers, or lip/tongue swelling.  No new medications, lotions, soaps, detergents, and no known allergies. no gingival bleeding or hematuria or GI bleeding    EXAM-  lungs CTA b/l  skin with diffuse nonblanching red macular rash    a/p  ?allergic type rash  not SJS  will trial po benadryl  d/w pt and daughter in detail importance of f/u with doctor in change of monitoring [place]

## 2022-10-08 NOTE — ED ADULT NURSE NOTE - NSIMPLEMENTINTERV_GEN_ALL_ED
Implemented All Universal Safety Interventions:  [place] to call system. Call bell, personal items and telephone within reach. Instruct patient to call for assistance. Room bathroom lighting operational. Non-slip footwear when patient is off stretcher. Physically safe environment: no spills, clutter or unnecessary equipment. Stretcher in lowest position, wheels locked, appropriate side rails in place.

## 2022-10-08 NOTE — ED ADULT NURSE NOTE - OBJECTIVE STATEMENT
Patient came in with the complaints of rash on Left arm, both legs with nausea. As per patient she is on vancomycin for the right knee replacement and rash appeared after she took vancomycin via PICC line. As per patient she is on vancomycin since 3 weeks and this happens first time. No other complaints. Patient denies fever/chest pain/sob. No distress noted. Awaiting for MD to see and further orders. Nursing care continues

## 2022-10-08 NOTE — ED PROVIDER NOTE - NS ED ROS FT
General: no fever, chills, weight gain or weight loss, changes in appetite  HEENT: no nasal congestion, cough, rhinorrhea, sore throat, headache, changes in vision  Cardio: no palpitations, pallor, chest pain or discomfort  Pulm: no shortness of breath, drooling  GI: +nausea, no vomiting, diarrhea, abdominal pain, constipation   /Renal: no dysuria, foul smelling urine, increased frequency, flank pain  MSK: +R knee pain, no back or extremity pain, gait changes  Endo: no temperature intolerance  Heme: no bruising or abnormal bleeding  Skin: +rash throughout body

## 2022-10-08 NOTE — ED PROVIDER NOTE - CLINICAL SUMMARY MEDICAL DECISION MAKING FREE TEXT BOX
77 yo F with HTN, HLD, pre-diabetes presenting with one day of mildly itchy rash diffusely over her body, currently on 6 weeks of IV vancomycin. Pt overall well with benign respiratory and abdominal exam. Low suspicion for anaphylactic rxn given lack of SOB, lack of GI sx, normotension, and good O2 saturation. Bunny syndrome unlikely given pt is on third week of vancomycin and no sx on initiation of abx. Pt likely with delayed-type drug-induced allergic rxn vs. allergic rxn from unclear etiology.     Plan:   - Benadryl 25mg and monitor

## 2022-10-08 NOTE — ED ADULT TRIAGE NOTE - CHIEF COMPLAINT QUOTE
allergic reaction    pt is on iv vancomycin for infected right knee replacement via right picc line.  developed generalized rash and dry mouth since 21:30.  denies sob, throat swelling.  no stridor, sob, drooling.  speaking freely.  pmhx pre-diabetes, htn, hld

## 2022-10-08 NOTE — ED PROVIDER NOTE - NSFOLLOWUPINSTRUCTIONS_ED_ALL_ED_FT
You were seen in the Emergency Room for a body rash   After our evaluation, we have determined you can be discharged home.    Please take 25mg of benadryl every 6hours for your symptoms    Please return to the Emergency Room if your symptoms change or worsen.    Please follow up with the doctor who prescribed you your Vancomycin to see if you need to modify your antibiotic plan.

## 2022-10-08 NOTE — ED PROVIDER NOTE - PATIENT PORTAL LINK FT
You can access the FollowMyHealth Patient Portal offered by [facility] by registering at the following website: [email or web address]. By joining [facility]’s FollowMyHealth portal, you will also be able to view your health information using other applications (apps) compatible with our system.

## 2022-10-08 NOTE — ED PROVIDER NOTE - OBJECTIVE STATEMENT
77 yo F with HTN, HLD, pre-diabetes presenting with one day of mildly itchy rash diffusely over her body. Pt first noticed rash on both thighs beginning on 10/6 at 7:30pm while using the bathroom. The rash progressed to her entire body. Pt is currently taking a 6 week course of IV vancomycin through R PICC line for septic R knee joint s/p knee replacement 6 months ago, began on 9/16 and ending on 10/28. Pt was asymptomatic before 10/6 while taking vancomycin. Endorses nausea since beginning vancomycin. Does not take any anti-nausea medications. Denies new soaps or detergents. Denies pain, swelling, SOB, drooling, chest pain, diarrhea, or constipation.

## 2022-10-08 NOTE — ED PROVIDER NOTE - PHYSICAL EXAMINATION
Gen: NAD, appears comfortable  HEENT: NCAT, MMM, Throat clear, PERRLA, EOMI, clear conjunctiva  Heart: S1S2+, RRR, no murmur, cap refill < 2 sec, 2+ peripheral pulses  Lungs: normal respiratory pattern, CTAB  Abd: soft, NT, ND, BSP  : deferred  Ext: FROM, no edema, no tenderness  Neuro: no focal deficits, awake, alert, no acute change from baseline exam  Skin: +non-blanching macular rash throughout body, convalescent in some areas over the thighs and pinpoint over the extremities b/l, no TTP.

## 2022-10-10 ENCOUNTER — NON-APPOINTMENT (OUTPATIENT)
Age: 77
End: 2022-10-10

## 2022-10-12 ENCOUNTER — APPOINTMENT (OUTPATIENT)
Dept: INFECTIOUS DISEASE | Facility: CLINIC | Age: 77
End: 2022-10-12

## 2022-10-12 VITALS
HEIGHT: 61 IN | BODY MASS INDEX: 26.06 KG/M2 | WEIGHT: 138 LBS | DIASTOLIC BLOOD PRESSURE: 82 MMHG | SYSTOLIC BLOOD PRESSURE: 145 MMHG | OXYGEN SATURATION: 98 % | TEMPERATURE: 98.3 F | HEART RATE: 105 BPM

## 2022-10-12 DIAGNOSIS — L27.0 GENERALIZED SKIN ERUPTION DUE TO DRUGS AND MEDICAMENTS TAKEN INTERNALLY: ICD-10-CM

## 2022-10-12 PROCEDURE — 99213 OFFICE O/P EST LOW 20 MIN: CPT

## 2022-10-12 NOTE — REVIEW OF SYSTEMS
[As Noted in HPI] : as noted in HPI [Skin Lesions] : skin lesion [Itching] : itching [Negative] : Heme/Lymph

## 2022-10-12 NOTE — ASSESSMENT
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 Hold vancomycin. Taking Benadryl PRN. \par \par Will try Medrol pack to assist with rash resolution. \par \par Will dose daptomycin 4 mg/kg iv q24 instead. \par \par Advised pt to hold rosuvastatin. \par \par Add CPK to weekly labs. \par \par Notified infusion company of above. \par \par D/w pt and .\par \par Pt not sure about doing knee surgery yet. \par \par \par \par  [Treatment Education] : treatment education [Treatment Adherence] : treatment adherence [Rx Dose / Side Effects] : Rx dose/side effects

## 2022-10-12 NOTE — PHYSICAL EXAM
[General Appearance - Alert] : alert [General Appearance - In No Acute Distress] : in no acute distress [Sclera] : the sclera and conjunctiva were normal [PERRL With Normal Accommodation] : pupils were equal in size, round, reactive to light [Extraocular Movements] : extraocular movements were intact [Outer Ear] : the ears and nose were normal in appearance [Oropharynx] : the oropharynx was normal with no thrush [Neck Appearance] : the appearance of the neck was normal [Neck Cervical Mass (___cm)] : no neck mass was observed [Jugular Venous Distention Increased] : there was no jugular-venous distention [Thyroid Diffuse Enlargement] : the thyroid was not enlarged [Auscultation Breath Sounds / Voice Sounds] : lungs were clear to auscultation bilaterally [Heart Sounds] : normal S1 and S2 [Full Pulse] : the pedal pulses are present [Edema] : there was no peripheral edema [Bowel Sounds] : normal bowel sounds [Abdomen Soft] : soft [Abdomen Tenderness] : non-tender [] : no hepato-splenomegaly [Abdomen Mass (___ Cm)] : no abdominal mass palpated [Costovertebral Angle Tenderness] : no CVA tenderness [Skin Color & Pigmentation] : normal skin color and pigmentation [No Focal Deficits] : no focal deficits [Oriented To Time, Place, And Person] : oriented to person, place, and time [Affect] : the affect was normal [FreeTextEntry1] : rash on arms, legs -macular, sparing palns and soles

## 2022-10-12 NOTE — HISTORY OF PRESENT ILLNESS
[FreeTextEntry1] : 75 y/o female comes for followup for right knee PJI and drug rash.\par \par She has been on IV vancomycin at home for her right knee. Started developing a rash on Saturday. Its spread from thighs to legs to arms. Itching+. Taking beandryl.\par \par No fever. No mouth sores or bleeding. Has not taken IV vanco since Sunday. \par \par Right knee pain is better. \par \par Advised pt would change abx to daptomycin given vanco allergy. \par \par No issues with picc.

## 2022-10-24 ENCOUNTER — LABORATORY RESULT (OUTPATIENT)
Age: 77
End: 2022-10-24

## 2022-10-25 ENCOUNTER — APPOINTMENT (OUTPATIENT)
Dept: INFECTIOUS DISEASE | Facility: CLINIC | Age: 77
End: 2022-10-25

## 2022-10-25 VITALS
HEART RATE: 75 BPM | HEIGHT: 61 IN | DIASTOLIC BLOOD PRESSURE: 70 MMHG | OXYGEN SATURATION: 99 % | TEMPERATURE: 97.8 F | SYSTOLIC BLOOD PRESSURE: 143 MMHG | BODY MASS INDEX: 25.11 KG/M2 | WEIGHT: 133 LBS

## 2022-10-25 PROCEDURE — 99213 OFFICE O/P EST LOW 20 MIN: CPT

## 2022-10-25 NOTE — ASSESSMENT
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 \par \par Pt wants to go to [facility] over [place] holidays and would like to postpone knee surgery. \par \par Will give Bactrim DS 1 tab po daily till surgery. She is allergic to PCN and refused Keflex. \par \par Risks of abx explained including GI, renal issues, allergy, etc. \par \par \par \par  [Treatment Education] : treatment education [Treatment Adherence] : treatment adherence [Rx Dose / Side Effects] : Rx dose/side effects

## 2022-10-25 NOTE — PHYSICAL EXAM
[General Appearance - Alert] : alert [General Appearance - In No Acute Distress] : in no acute distress [Sclera] : the sclera and conjunctiva were normal [PERRL With Normal Accommodation] : pupils were equal in size, round, reactive to light [Extraocular Movements] : extraocular movements were intact [Outer Ear] : the ears and nose were normal in appearance [Oropharynx] : the oropharynx was normal with no thrush [Neck Appearance] : the appearance of the neck was normal [Neck Cervical Mass (___cm)] : no neck mass was observed [Jugular Venous Distention Increased] : there was no jugular-venous distention [Thyroid Diffuse Enlargement] : the thyroid was not enlarged [Auscultation Breath Sounds / Voice Sounds] : lungs were clear to auscultation bilaterally [Heart Sounds] : normal S1 and S2 [Full Pulse] : the pedal pulses are present [Edema] : there was no peripheral edema [Bowel Sounds] : normal bowel sounds [Abdomen Soft] : soft [Abdomen Tenderness] : non-tender [Abdomen Mass (___ Cm)] : no abdominal mass palpated [Costovertebral Angle Tenderness] : no CVA tenderness [FreeTextEntry1] : right knee incision intact, swelling less, no redness, some warmth, decreased ROM  [Skin Color & Pigmentation] : normal skin color and pigmentation [] : no rash [No Focal Deficits] : no focal deficits [Oriented To Time, Place, And Person] : oriented to person, place, and time [Affect] : the affect was normal

## 2022-10-25 NOTE — HISTORY OF PRESENT ILLNESS
[FreeTextEntry1] : 75 y/o female comes for followup for right knee PJI and drug rash.\par \par S/p rash with IV vanco and now on Daptomycin with no issues. Rash resolved.\par \par No fever. No mouth sores or bleeding. \par \par Right knee pain is better and has started driving. \par \par No GI or Gu issues. \par \par No issues with picc.

## 2022-11-07 ENCOUNTER — RX RENEWAL (OUTPATIENT)
Age: 77
End: 2022-11-07

## 2022-11-09 ENCOUNTER — RESULT CHARGE (OUTPATIENT)
Age: 77
End: 2022-11-09

## 2022-11-10 ENCOUNTER — APPOINTMENT (OUTPATIENT)
Dept: ORTHOPEDIC SURGERY | Facility: CLINIC | Age: 77
End: 2022-11-10

## 2022-11-10 VITALS
SYSTOLIC BLOOD PRESSURE: 127 MMHG | TEMPERATURE: 97.8 F | BODY MASS INDEX: 23.56 KG/M2 | OXYGEN SATURATION: 97 % | HEIGHT: 60 IN | DIASTOLIC BLOOD PRESSURE: 68 MMHG | WEIGHT: 120 LBS | HEART RATE: 101 BPM

## 2022-11-10 PROCEDURE — 20610 DRAIN/INJ JOINT/BURSA W/O US: CPT | Mod: RT

## 2022-11-10 PROCEDURE — 99213 OFFICE O/P EST LOW 20 MIN: CPT | Mod: 25

## 2022-11-10 PROCEDURE — 73562 X-RAY EXAM OF KNEE 3: CPT | Mod: RT

## 2022-11-11 LAB
B PERT IGG+IGM PNL SER: ABNORMAL
COLOR FLD: NORMAL
EOSINOPHIL # FLD MANUAL: 0 %
FLUID INTAKE SUBSTANCE CLASS: NORMAL
LYMPHOCYTES # FLD MANUAL: 3 %
MESOTHL CELL NFR FLD: 0 %
MONOS+MACROS NFR FLD MANUAL: 2 %
NEUTS SEG # FLD MANUAL: 95 %
NRBC # FLD: 0 %
RBC # FLD MANUAL: ABNORMAL /UL
SYCRY CLARITY: ABNORMAL
SYCRY COLOR: ABNORMAL
SYCRY ID: NORMAL
SYCRY TUBE: NORMAL
TOTAL CELLS COUNTED FLD: NORMAL /UL
TUBE TYPE: NORMAL
UNIDENT CELLS NFR FLD MANUAL: 0 %
VARIANT LYMPHS # FLD MANUAL: 0 %

## 2022-11-13 ENCOUNTER — NON-APPOINTMENT (OUTPATIENT)
Age: 77
End: 2022-11-13

## 2022-11-14 ENCOUNTER — TRANSCRIPTION ENCOUNTER (OUTPATIENT)
Age: 77
End: 2022-11-14

## 2022-11-14 ENCOUNTER — INPATIENT (INPATIENT)
Facility: HOSPITAL | Age: 77
LOS: 7 days | Discharge: NURSING, REHAB OR LONG-TERM CARE | DRG: 464 | End: 2022-11-22
Attending: ORTHOPAEDIC SURGERY | Admitting: ORTHOPAEDIC SURGERY
Payer: MEDICARE

## 2022-11-14 VITALS
OXYGEN SATURATION: 99 % | SYSTOLIC BLOOD PRESSURE: 168 MMHG | TEMPERATURE: 98 F | RESPIRATION RATE: 19 BRPM | DIASTOLIC BLOOD PRESSURE: 83 MMHG | WEIGHT: 130.07 LBS | HEIGHT: 61 IN | HEART RATE: 82 BPM

## 2022-11-14 DIAGNOSIS — Z90.49 ACQUIRED ABSENCE OF OTHER SPECIFIED PARTS OF DIGESTIVE TRACT: Chronic | ICD-10-CM

## 2022-11-14 DIAGNOSIS — Z96.651 PRESENCE OF RIGHT ARTIFICIAL KNEE JOINT: Chronic | ICD-10-CM

## 2022-11-14 DIAGNOSIS — M25.469 EFFUSION, UNSPECIFIED KNEE: ICD-10-CM

## 2022-11-14 LAB
ANION GAP SERPL CALC-SCNC: 13 MMOL/L — SIGNIFICANT CHANGE UP (ref 5–17)
APTT BLD: 31.9 SEC — SIGNIFICANT CHANGE UP (ref 27.5–35.5)
BLD GP AB SCN SERPL QL: NEGATIVE — SIGNIFICANT CHANGE UP
BUN SERPL-MCNC: 34 MG/DL — HIGH (ref 7–23)
CALCIUM SERPL-MCNC: 9.5 MG/DL — SIGNIFICANT CHANGE UP (ref 8.4–10.5)
CHLORIDE SERPL-SCNC: 98 MMOL/L — SIGNIFICANT CHANGE UP (ref 96–108)
CO2 SERPL-SCNC: 18 MMOL/L — LOW (ref 22–31)
CREAT SERPL-MCNC: 2.19 MG/DL — HIGH (ref 0.5–1.3)
CRP SERPL-MCNC: 64 MG/L — HIGH (ref 0–4)
EGFR: 22 ML/MIN/1.73M2 — LOW
GLUCOSE SERPL-MCNC: 137 MG/DL — HIGH (ref 70–99)
HCT VFR BLD CALC: 27.5 % — LOW (ref 34.5–45)
HGB BLD-MCNC: 8.7 G/DL — LOW (ref 11.5–15.5)
INR BLD: 1 RATIO — SIGNIFICANT CHANGE UP (ref 0.88–1.16)
MCHC RBC-ENTMCNC: 28.8 PG — SIGNIFICANT CHANGE UP (ref 27–34)
MCHC RBC-ENTMCNC: 31.6 GM/DL — LOW (ref 32–36)
MCV RBC AUTO: 91.1 FL — SIGNIFICANT CHANGE UP (ref 80–100)
NRBC # BLD: 0 /100 WBCS — SIGNIFICANT CHANGE UP (ref 0–0)
PLATELET # BLD AUTO: 390 K/UL — SIGNIFICANT CHANGE UP (ref 150–400)
POTASSIUM SERPL-MCNC: 4.9 MMOL/L — SIGNIFICANT CHANGE UP (ref 3.5–5.3)
POTASSIUM SERPL-SCNC: 4.9 MMOL/L — SIGNIFICANT CHANGE UP (ref 3.5–5.3)
PROTHROM AB SERPL-ACNC: 11.5 SEC — SIGNIFICANT CHANGE UP (ref 10.5–13.4)
RBC # BLD: 3.02 M/UL — LOW (ref 3.8–5.2)
RBC # FLD: 16 % — HIGH (ref 10.3–14.5)
RH IG SCN BLD-IMP: POSITIVE — SIGNIFICANT CHANGE UP
RH IG SCN BLD-IMP: POSITIVE — SIGNIFICANT CHANGE UP
SARS-COV-2 RNA SPEC QL NAA+PROBE: SIGNIFICANT CHANGE UP
SODIUM SERPL-SCNC: 129 MMOL/L — LOW (ref 135–145)
WBC # BLD: 10.98 K/UL — HIGH (ref 3.8–10.5)
WBC # FLD AUTO: 10.98 K/UL — HIGH (ref 3.8–10.5)

## 2022-11-14 PROCEDURE — 93010 ELECTROCARDIOGRAM REPORT: CPT

## 2022-11-14 PROCEDURE — 99284 EMERGENCY DEPT VISIT MOD MDM: CPT | Mod: FS

## 2022-11-14 PROCEDURE — 99223 1ST HOSP IP/OBS HIGH 75: CPT | Mod: FS,57

## 2022-11-14 PROCEDURE — 71045 X-RAY EXAM CHEST 1 VIEW: CPT | Mod: 26

## 2022-11-14 PROCEDURE — 93970 EXTREMITY STUDY: CPT | Mod: 26

## 2022-11-14 RX ORDER — ACETAMINOPHEN 500 MG
650 TABLET ORAL EVERY 6 HOURS
Refills: 0 | Status: DISCONTINUED | OUTPATIENT
Start: 2022-11-14 | End: 2022-11-16

## 2022-11-14 RX ORDER — OXYCODONE HYDROCHLORIDE 5 MG/1
5 TABLET ORAL EVERY 4 HOURS
Refills: 0 | Status: DISCONTINUED | OUTPATIENT
Start: 2022-11-14 | End: 2022-11-15

## 2022-11-14 RX ORDER — ONDANSETRON 8 MG/1
4 TABLET, FILM COATED ORAL EVERY 6 HOURS
Refills: 0 | Status: DISCONTINUED | OUTPATIENT
Start: 2022-11-14 | End: 2022-11-16

## 2022-11-14 RX ORDER — HEPARIN SODIUM 5000 [USP'U]/ML
5000 INJECTION INTRAVENOUS; SUBCUTANEOUS EVERY 8 HOURS
Refills: 0 | Status: COMPLETED | OUTPATIENT
Start: 2022-11-14 | End: 2022-11-15

## 2022-11-14 RX ORDER — ASCORBIC ACID 60 MG
500 TABLET,CHEWABLE ORAL
Refills: 0 | Status: DISCONTINUED | OUTPATIENT
Start: 2022-11-14 | End: 2022-11-16

## 2022-11-14 RX ORDER — OXYCODONE HYDROCHLORIDE 5 MG/1
10 TABLET ORAL EVERY 4 HOURS
Refills: 0 | Status: DISCONTINUED | OUTPATIENT
Start: 2022-11-14 | End: 2022-11-15

## 2022-11-14 RX ORDER — PANTOPRAZOLE SODIUM 20 MG/1
40 TABLET, DELAYED RELEASE ORAL
Refills: 0 | Status: DISCONTINUED | OUTPATIENT
Start: 2022-11-14 | End: 2022-11-16

## 2022-11-14 RX ORDER — CEFAZOLIN SODIUM 1 G
VIAL (EA) INJECTION
Refills: 0 | Status: DISCONTINUED | OUTPATIENT
Start: 2022-11-14 | End: 2022-11-16

## 2022-11-14 RX ORDER — FOLIC ACID 0.8 MG
1 TABLET ORAL DAILY
Refills: 0 | Status: DISCONTINUED | OUTPATIENT
Start: 2022-11-14 | End: 2022-11-16

## 2022-11-14 RX ORDER — CEFAZOLIN SODIUM 1 G
VIAL (EA) INJECTION
Refills: 0 | Status: DISCONTINUED | OUTPATIENT
Start: 2022-11-14 | End: 2022-11-14

## 2022-11-14 RX ORDER — CEFAZOLIN SODIUM 1 G
1000 VIAL (EA) INJECTION EVERY 12 HOURS
Refills: 0 | Status: DISCONTINUED | OUTPATIENT
Start: 2022-11-15 | End: 2022-11-16

## 2022-11-14 RX ORDER — CEFAZOLIN SODIUM 1 G
1000 VIAL (EA) INJECTION ONCE
Refills: 0 | Status: COMPLETED | OUTPATIENT
Start: 2022-11-14 | End: 2022-11-14

## 2022-11-14 RX ADMIN — OXYCODONE HYDROCHLORIDE 10 MILLIGRAM(S): 5 TABLET ORAL at 23:46

## 2022-11-14 RX ADMIN — Medication 650 MILLIGRAM(S): at 20:21

## 2022-11-14 RX ADMIN — Medication 100 MILLIGRAM(S): at 19:13

## 2022-11-14 RX ADMIN — Medication 650 MILLIGRAM(S): at 21:00

## 2022-11-14 NOTE — ED PROVIDER NOTE - ATTENDING APP SHARED VISIT CONTRIBUTION OF CARE
77-year-old female experiencing right knee pain, which is severe in intensity. Has a R TKA PJI. Pain and symptoms worsening. Now she is becoming sick. Last week she saw Nevaeh and me and had a severely swollen knee. +MSSA.  pt sent in for OR wash out, id consult. pt seen by ortho resident in waiting room brought back for admission, orders as per ortho service, vss.

## 2022-11-14 NOTE — ED ADULT NURSE NOTE - OBJECTIVE STATEMENT
patient received via wheelchair accompanied by . S/p knee replacements last April & now infected & sent here by Md for admisssion & scheduled surgery on wednesday. Denies fever, nausea, vomiting. Having difficulty walking due to pain since after surgery.

## 2022-11-14 NOTE — CONSULT NOTE ADULT - SUBJECTIVE AND OBJECTIVE BOX
This is very nice 77-year-old female experiencing right knee pain, which is severe in intensity. Has a R TKA PJI. Pain and symptoms worsening. Now she is becoming sick. Last week she saw Nevaeh and me and had a severely swollen knee. +MSSA. Now even worse from weekend. Cannot bear weight and was directed to ED for uncontrolled infection now. She underwent a right total knee arthroplasty April 8, 2022 by Dr. Herring @[facility]. Initial recovery was uncomplicated however she was complicated by wound breakdown approximately treat with Bactrim for MSSA positive culture. The wound did heal but her knee has never improved since surgery. The pain substantially limits activities of daily living. Walking tolerance is reduced. Medication including meloxicam and activity modification have been minimally effective for a period lasting greater than three months in duration. Assistive devices and external support were not deemed by the patient to be helpful in improving their function. Pain and restriction of function are intolerable at this time. The patient denies any radiation of the pain to the feet and it is not associated with numbness, tingling, or weakness. On bactrim now.     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.65) (Z96.651)    Current Meds  Meloxicam 15 MG Oral Tablet; TAKE 1 TABLET BY MOUTH DAILY WITH FOOD  Meloxicam 15 MG Oral Tablet; TAKE 1 TABLET BY MOUTH DAILY WITH FOOD  Meloxicam 7.5 MG Oral Tablet; TAKE 1 TABLET Twice daily After Meals  Mupirocin 2 % External Ointment; APPLY THIN FILM  TO AFFECTED AREA 3 TIMES DAILY  FOR 7 TO 10 DAYS  Mupirocin 2 % External Ointment; APPLY THIN FILM  TO AFFECTED AREA 3 TIMES DAILY  oxyCODONE HCl - 5 MG Oral Tablet; TAKE 1 TABLET Every 6 hours AS NEEDED FOR  BREAKTHROUGH PAIN MDD:4 TABS  oxyCODONE HCl - 5 MG Oral Tablet; TAKE 1 TABLET Every 6 hours AS NEEDED FOR  BREAKTHROUGH PAIN MDD:4 TABS  Sulfamethoxazole-Trimethoprim 800-160 MG Oral Tablet; TAKE 1 TABLET TWICE DAILY  UNTIL FINISHED  Sulfamethoxazole-Trimethoprim 800-160 MG Oral Tablet; TAKE 1 TABLET TWICE DAILY  WITH FOOD  traMADol HCl - 50 MG Oral Tablet; TAKE 1 TABLET 3 TIMES DAILY AS NEEDED FOR  MODERATE PAIN. MDD:3 tabs  traMADol HCl - 50 MG Oral Tablet; TAKE 1 TABLET 3 TIMES DAILY AS NEEDED. MDD:3  tabs  traMADol HCl - 50 MG Oral Tablet; TAKE 1 TABLET 3 TIMES DAILY AS NEEDED. MDD:3  tabs  traMADol HCl - 50 MG Oral Tablet; TAKE 1 TABLET 3 TIMES DAILY AS NEEDED. MDD:3  tabs  traMADol HCl - 50 MG Oral Tablet; TAKE 1 TABLET At Bedtime MDD:1 tablet  traMADol HCl - 50 MG Oral Tablet; TAKE 1 TABLET Daily PRN pain MDD:1 tab    Allergies  Penicillins    Review of Systems    Musculoskeletal: arthralgia, joint pain, joint stiffness and joint swelling.   Constitutional, Eyes, ENT, Cardiovascular, Respiratory, Gastrointestinal, Genitourinary, Integumentary, Neurological, Psychiatric, Endocrine and Heme/Lymph review of systems are otherwise negative except as noted in HPI.      Physical Exam  Patient is well nourished, well-developed, in no acute distress, with appropriate mood and affect. The patient is oriented to time, place, and person. Respirations are even and unlabored. Gait evaluation does reveal a limp. There is no inguinal adenopathy. Examination of the contralateral knee shows normal range of motion, strength, no tenderness, and intact skin. The affected limb is well-perfused, without skin lesions, shows a grossly normal motor and sensory examination. Knee motion is significantly reduced and does cause significant pain. The knee moves from 0-80 degrees. The knee is stable within that range-of-motion to AP and ML stress. The alignment of the knee is neutral. Well-healed midline surgical scar.. Muscle strength is normal. Pedal pulses are palpable. Hip examination was negative.

## 2022-11-14 NOTE — ED PROVIDER NOTE - PHYSICAL EXAMINATION
NAD  HEENT: NCAT  neck: supple  eyes: EOMI  cardio: normal s1,s2. RRR  pulm: CTABL  abd: soft, ndnttp  MSK: right knee swelling appreciated. erythematous. ttp. patient able to move her knee/right leg with pain  neuro: AOX3. no focal deficits noted

## 2022-11-14 NOTE — ED PROVIDER NOTE - NS ED ATTENDING STATEMENT MOD
This was a shared visit with the DEVIN. I reviewed and verified the documentation and independently performed the documented:

## 2022-11-14 NOTE — ED CLERICAL - NS ED CLERK NOTE PRE-ARRIVAL INFORMATION; ADDITIONAL PRE-ARRIVAL INFORMATION
This patient is enrolled in the comprehensive joint replacement (CJR) program and has active care navigation.  This patient can be followed up by the care navigation team within 24 hours. To arrange close follow-up or to obtain additional clinical information about this patient, please call the contact number above. Please call the orthopedic resident ([phone]) for ALL patients who are admitted or placed in observation.

## 2022-11-14 NOTE — ED PROVIDER NOTE - OBJECTIVE STATEMENT
77-year-old female, recent right knee replacement 4/22, presents sent in by ortho for admission. patient has been experiencing right knee pain which has been severe in intensity. +MSSA. no known falls. denies f/n/v/d, CP, SOB, LOC, numbness, tingling, dizziness, HA.

## 2022-11-14 NOTE — CONSULT NOTE ADULT - SUBJECTIVE AND OBJECTIVE BOX
"HPI: 77 y/o female comes for followup for right knee PJI and drug rash.    S/p rash with IV vanco and now on Daptomycin with no issues. Rash resolved.    No fever. No mouth sores or bleeding.     Right knee pain is better and has started driving.     No GI or Gu issues.     No issues with picc. "    Above from darrick  Patient with suspected PJI being workup on as outpatient  S/p course daptomycin, no on suppressive bactrim  Now sent to hospital for admission for planned explant of hardware  Patient with R knee pain, can walk but has pain  No fevers, no chills  No other focal complaints  ID called for further eval    PAST MEDICAL & SURGICAL HISTORY:  HTN (hypertension)    Diabetes mellitus  recently started on Januvia    Hyperlipidemia    History of cholecystectomy  &quot;years ago&quot;    Allergies    penicillins (Short breath (Severe))    Intolerances    ANTIMICROBIALS:  Daptomycin as outpatient, no on Bactrim (?rash to vanco)    OTHER MEDS:      SOCIAL HISTORY: No tobacco, no alcohol, no illicit drugs    FAMILY HISTORY: No pertinent family history relating to chief complaint    Drug Dosing Weight  Height (cm): 154.9 (14 Nov 2022 13:00)  Weight (kg): 59 (14 Nov 2022 13:00)  BMI (kg/m2): 24.6 (14 Nov 2022 13:00)  BSA (m2): 1.57 (14 Nov 2022 13:00)    PE:    Vital Signs Last 24 Hrs  T(C): 36.7 (14 Nov 2022 13:00), Max: 36.7 (14 Nov 2022 13:00)  T(F): 98.1 (14 Nov 2022 13:00), Max: 98.1 (14 Nov 2022 13:00)  HR: 82 (14 Nov 2022 13:00) (82 - 82)  BP: 168/83 (14 Nov 2022 13:00) (168/83 - 168/83)  RR: 19 (14 Nov 2022 13:00) (19 - 19)  SpO2: 99% (14 Nov 2022 13:00) (99% - 99%)    Gen: AOx3, NAD, non-toxic, pleasant  CV: S1+S2 normal, nontachycardic  Resp: Clear bilat, no resp distress, no crackles/wheezes  Abd: Soft, nontender, +BS  Ext: No LE edema, no wounds  : No Mireles  IV/Skin: No thrombophlebitis  Msk: No low back pain, no arthralgias, no joint swelling  Neuro: No sensory deficits, no motor deficits    LABS:    No new available    [employer] arthrocentesis, WBC [id number]    MICROBIOLOGY:    Arthrocentesis culture: MSSA    RADIOLOGY:    No new available

## 2022-11-15 ENCOUNTER — TRANSCRIPTION ENCOUNTER (OUTPATIENT)
Age: 77
End: 2022-11-15

## 2022-11-15 DIAGNOSIS — D64.9 ANEMIA, UNSPECIFIED: ICD-10-CM

## 2022-11-15 DIAGNOSIS — M25.561 PAIN IN RIGHT KNEE: ICD-10-CM

## 2022-11-15 DIAGNOSIS — N17.9 ACUTE KIDNEY FAILURE, UNSPECIFIED: ICD-10-CM

## 2022-11-15 DIAGNOSIS — I10 ESSENTIAL (PRIMARY) HYPERTENSION: ICD-10-CM

## 2022-11-15 DIAGNOSIS — E11.9 TYPE 2 DIABETES MELLITUS WITHOUT COMPLICATIONS: ICD-10-CM

## 2022-11-15 LAB
ANION GAP SERPL CALC-SCNC: 12 MMOL/L — SIGNIFICANT CHANGE UP (ref 5–17)
ANION GAP SERPL CALC-SCNC: 13 MMOL/L — SIGNIFICANT CHANGE UP (ref 5–17)
APPEARANCE UR: CLEAR — SIGNIFICANT CHANGE UP
APTT BLD: 31.9 SEC — SIGNIFICANT CHANGE UP (ref 27.5–35.5)
BACTERIA # UR AUTO: NEGATIVE — SIGNIFICANT CHANGE UP
BILIRUB UR-MCNC: NEGATIVE — SIGNIFICANT CHANGE UP
BLD GP AB SCN SERPL QL: NEGATIVE — SIGNIFICANT CHANGE UP
BUN SERPL-MCNC: 26 MG/DL — HIGH (ref 7–23)
BUN SERPL-MCNC: 30 MG/DL — HIGH (ref 7–23)
CALCIUM SERPL-MCNC: 9.1 MG/DL — SIGNIFICANT CHANGE UP (ref 8.4–10.5)
CALCIUM SERPL-MCNC: 9.5 MG/DL — SIGNIFICANT CHANGE UP (ref 8.4–10.5)
CHLORIDE SERPL-SCNC: 98 MMOL/L — SIGNIFICANT CHANGE UP (ref 96–108)
CHLORIDE SERPL-SCNC: 99 MMOL/L — SIGNIFICANT CHANGE UP (ref 96–108)
CO2 SERPL-SCNC: 19 MMOL/L — LOW (ref 22–31)
CO2 SERPL-SCNC: 20 MMOL/L — LOW (ref 22–31)
COLOR SPEC: SIGNIFICANT CHANGE UP
CREAT SERPL-MCNC: 1.96 MG/DL — HIGH (ref 0.5–1.3)
CREAT SERPL-MCNC: 2.01 MG/DL — HIGH (ref 0.5–1.3)
DIFF PNL FLD: NEGATIVE — SIGNIFICANT CHANGE UP
EGFR: 25 ML/MIN/1.73M2 — LOW
EGFR: 26 ML/MIN/1.73M2 — LOW
EPI CELLS # UR: 1 /HPF — SIGNIFICANT CHANGE UP
ERYTHROCYTE [SEDIMENTATION RATE] IN BLOOD: 59 MM/HR — HIGH (ref 0–20)
GLUCOSE SERPL-MCNC: 138 MG/DL — HIGH (ref 70–99)
GLUCOSE SERPL-MCNC: 164 MG/DL — HIGH (ref 70–99)
GLUCOSE UR QL: NEGATIVE — SIGNIFICANT CHANGE UP
HCT VFR BLD CALC: 25.4 % — LOW (ref 34.5–45)
HGB BLD-MCNC: 8.4 G/DL — LOW (ref 11.5–15.5)
INR BLD: 1.05 RATIO — SIGNIFICANT CHANGE UP (ref 0.88–1.16)
KETONES UR-MCNC: NEGATIVE — SIGNIFICANT CHANGE UP
LEUKOCYTE ESTERASE UR-ACNC: NEGATIVE — SIGNIFICANT CHANGE UP
MCHC RBC-ENTMCNC: 29.6 PG — SIGNIFICANT CHANGE UP (ref 27–34)
MCHC RBC-ENTMCNC: 33.1 GM/DL — SIGNIFICANT CHANGE UP (ref 32–36)
MCV RBC AUTO: 89.4 FL — SIGNIFICANT CHANGE UP (ref 80–100)
NITRITE UR-MCNC: NEGATIVE — SIGNIFICANT CHANGE UP
NRBC # BLD: 0 /100 WBCS — SIGNIFICANT CHANGE UP (ref 0–0)
PH UR: 6.5 — SIGNIFICANT CHANGE UP (ref 5–8)
PLATELET # BLD AUTO: 339 K/UL — SIGNIFICANT CHANGE UP (ref 150–400)
POTASSIUM SERPL-MCNC: 4.5 MMOL/L — SIGNIFICANT CHANGE UP (ref 3.5–5.3)
POTASSIUM SERPL-MCNC: 5.5 MMOL/L — HIGH (ref 3.5–5.3)
POTASSIUM SERPL-SCNC: 4.5 MMOL/L — SIGNIFICANT CHANGE UP (ref 3.5–5.3)
POTASSIUM SERPL-SCNC: 5.5 MMOL/L — HIGH (ref 3.5–5.3)
PROT UR-MCNC: ABNORMAL
PROTHROM AB SERPL-ACNC: 12.1 SEC — SIGNIFICANT CHANGE UP (ref 10.5–13.4)
RBC # BLD: 2.84 M/UL — LOW (ref 3.8–5.2)
RBC # FLD: 15.7 % — HIGH (ref 10.3–14.5)
RBC CASTS # UR COMP ASSIST: 0 /HPF — SIGNIFICANT CHANGE UP (ref 0–4)
RH IG SCN BLD-IMP: POSITIVE — SIGNIFICANT CHANGE UP
SODIUM SERPL-SCNC: 130 MMOL/L — LOW (ref 135–145)
SODIUM SERPL-SCNC: 131 MMOL/L — LOW (ref 135–145)
SP GR SPEC: 1.01 — LOW (ref 1.01–1.02)
UROBILINOGEN FLD QL: NEGATIVE — SIGNIFICANT CHANGE UP
WBC # BLD: 9.93 K/UL — SIGNIFICANT CHANGE UP (ref 3.8–10.5)
WBC # FLD AUTO: 9.93 K/UL — SIGNIFICANT CHANGE UP (ref 3.8–10.5)
WBC UR QL: 1 /HPF — SIGNIFICANT CHANGE UP (ref 0–5)

## 2022-11-15 PROCEDURE — 76770 US EXAM ABDO BACK WALL COMP: CPT | Mod: 26

## 2022-11-15 PROCEDURE — 93306 TTE W/DOPPLER COMPLETE: CPT | Mod: 26

## 2022-11-15 PROCEDURE — 73700 CT LOWER EXTREMITY W/O DYE: CPT | Mod: 26,RT

## 2022-11-15 PROCEDURE — 99232 SBSQ HOSP IP/OBS MODERATE 35: CPT

## 2022-11-15 RX ORDER — INFLUENZA VIRUS VACCINE 15; 15; 15; 15 UG/.5ML; UG/.5ML; UG/.5ML; UG/.5ML
0.7 SUSPENSION INTRAMUSCULAR ONCE
Refills: 0 | Status: DISCONTINUED | OUTPATIENT
Start: 2022-11-15 | End: 2022-11-22

## 2022-11-15 RX ORDER — SODIUM ZIRCONIUM CYCLOSILICATE 10 G/10G
5 POWDER, FOR SUSPENSION ORAL ONCE
Refills: 0 | Status: COMPLETED | OUTPATIENT
Start: 2022-11-15 | End: 2022-11-15

## 2022-11-15 RX ORDER — ONDANSETRON 8 MG/1
4 TABLET, FILM COATED ORAL ONCE
Refills: 0 | Status: COMPLETED | OUTPATIENT
Start: 2022-11-15 | End: 2022-11-15

## 2022-11-15 RX ORDER — ATORVASTATIN CALCIUM 80 MG/1
80 TABLET, FILM COATED ORAL AT BEDTIME
Refills: 0 | Status: DISCONTINUED | OUTPATIENT
Start: 2022-11-15 | End: 2022-11-16

## 2022-11-15 RX ORDER — LOSARTAN POTASSIUM 100 MG/1
100 TABLET, FILM COATED ORAL DAILY
Refills: 0 | Status: DISCONTINUED | OUTPATIENT
Start: 2022-11-15 | End: 2022-11-15

## 2022-11-15 RX ORDER — SODIUM CHLORIDE 9 MG/ML
1000 INJECTION, SOLUTION INTRAVENOUS
Refills: 0 | Status: DISCONTINUED | OUTPATIENT
Start: 2022-11-15 | End: 2022-11-15

## 2022-11-15 RX ORDER — METOPROLOL TARTRATE 50 MG
50 TABLET ORAL DAILY
Refills: 0 | Status: DISCONTINUED | OUTPATIENT
Start: 2022-11-15 | End: 2022-11-16

## 2022-11-15 RX ORDER — SODIUM CHLORIDE 9 MG/ML
500 INJECTION INTRAMUSCULAR; INTRAVENOUS; SUBCUTANEOUS
Refills: 0 | Status: DISCONTINUED | OUTPATIENT
Start: 2022-11-15 | End: 2022-11-16

## 2022-11-15 RX ORDER — AMLODIPINE BESYLATE 2.5 MG/1
5 TABLET ORAL DAILY
Refills: 0 | Status: DISCONTINUED | OUTPATIENT
Start: 2022-11-15 | End: 2022-11-16

## 2022-11-15 RX ORDER — RALOXIFENE HYDROCHLORIDE 60 MG/1
60 TABLET, COATED ORAL DAILY
Refills: 0 | Status: DISCONTINUED | OUTPATIENT
Start: 2022-11-15 | End: 2022-11-16

## 2022-11-15 RX ADMIN — Medication 500 MILLIGRAM(S): at 06:29

## 2022-11-15 RX ADMIN — HEPARIN SODIUM 5000 UNIT(S): 5000 INJECTION INTRAVENOUS; SUBCUTANEOUS at 22:38

## 2022-11-15 RX ADMIN — RALOXIFENE HYDROCHLORIDE 60 MILLIGRAM(S): 60 TABLET, COATED ORAL at 14:28

## 2022-11-15 RX ADMIN — Medication 500 MILLIGRAM(S): at 18:13

## 2022-11-15 RX ADMIN — Medication 100 MILLIGRAM(S): at 08:39

## 2022-11-15 RX ADMIN — Medication 650 MILLIGRAM(S): at 19:51

## 2022-11-15 RX ADMIN — Medication 100 MILLIGRAM(S): at 18:13

## 2022-11-15 RX ADMIN — ATORVASTATIN CALCIUM 80 MILLIGRAM(S): 80 TABLET, FILM COATED ORAL at 22:38

## 2022-11-15 RX ADMIN — SODIUM CHLORIDE 75 MILLILITER(S): 9 INJECTION INTRAMUSCULAR; INTRAVENOUS; SUBCUTANEOUS at 20:30

## 2022-11-15 RX ADMIN — Medication 1 MILLIGRAM(S): at 13:25

## 2022-11-15 RX ADMIN — LOSARTAN POTASSIUM 100 MILLIGRAM(S): 100 TABLET, FILM COATED ORAL at 06:29

## 2022-11-15 RX ADMIN — SODIUM ZIRCONIUM CYCLOSILICATE 5 GRAM(S): 10 POWDER, FOR SUSPENSION ORAL at 14:29

## 2022-11-15 RX ADMIN — ONDANSETRON 4 MILLIGRAM(S): 8 TABLET, FILM COATED ORAL at 10:13

## 2022-11-15 RX ADMIN — Medication 50 MILLIGRAM(S): at 20:30

## 2022-11-15 RX ADMIN — HEPARIN SODIUM 5000 UNIT(S): 5000 INJECTION INTRAVENOUS; SUBCUTANEOUS at 06:30

## 2022-11-15 RX ADMIN — PANTOPRAZOLE SODIUM 40 MILLIGRAM(S): 20 TABLET, DELAYED RELEASE ORAL at 06:29

## 2022-11-15 RX ADMIN — HEPARIN SODIUM 5000 UNIT(S): 5000 INJECTION INTRAVENOUS; SUBCUTANEOUS at 13:27

## 2022-11-15 RX ADMIN — Medication 1 TABLET(S): at 13:25

## 2022-11-15 NOTE — PROGRESS NOTE ADULT - SUBJECTIVE AND OBJECTIVE BOX
CC: F/U for PJI    Saw/spoke to patient. No fevers, no chills. No new complaints.    Allergies  penicillins (Short breath (Severe))    ANTIMICROBIALS:  ceFAZolin   IVPB    ceFAZolin   IVPB 1000 every 12 hours    PE:    Vital Signs Last 24 Hrs  T(C): 36.6 (15 Nov 2022 11:30), Max: 37.1 (14 Nov 2022 22:17)  T(F): 97.9 (15 Nov 2022 11:30), Max: 98.7 (14 Nov 2022 22:17)  HR: 71 (15 Nov 2022 11:30) (71 - 94)  BP: 169/75 (15 Nov 2022 11:30) (138/76 - 173/80)  RR: 17 (15 Nov 2022 11:30) (17 - 20)  SpO2: 99% (15 Nov 2022 11:30) (96% - 100%)    Gen: AOx3, NAD, non-toxic  CV: Nontachycardic  Resp: Breathing comfortably, RA  Abd: Soft, nontender  IV/Skin: No thrombophlebitis    LABS:                        8.4    9.93  )-----------( 339      ( 15 Nov 2022 08:04 )             25.4     11-15    131<L>  |  99  |  30<H>  ----------------------------<  164<H>  5.5<H>   |  20<L>  |  2.01<H>    Ca    9.1      15 Nov 2022 08:04    MICROBIOLOGY:    COVID neg    RADIOLOGY:    11/15 USG:    FINDINGS:  Right kidney: 10.1 cm. No hydronephrosis. No renal mass or calculus   visualized.    Left kidney:  9.6 cm. No hydronephrosis. No renal mass or calculus   visualized.    Urinary bladder: Within normal limits.    IMPRESSION:    No hydronephrosis.

## 2022-11-15 NOTE — CONSULT NOTE ADULT - PROBLEM SELECTOR RECOMMENDATION 2
d/c'd losartan  c/w meds as ordered  continue to monitor d/c  losartan gibe suspected YOUNG on CKD for now   continue to monitor d/c  losartan given suspected YOUNG on CKD for now   continue to monitor

## 2022-11-15 NOTE — CONSULT NOTE ADULT - PROBLEM SELECTOR RECOMMENDATION 9
hx of R TKA 04/08/2022    - now with worsening knee pain   Plan for OR on Wednesday     - RCRI Class II - no anginal symptoms, no SOB - acceptable cardiac risk to proceed.    - ECHO reviewed - normal LVEF, no VHD  pain management  management as per ortho hx of R TKA 04/08/2022    - now with worsening knee pain   Plan for OR on Wednesday     - RCRI Class II - METS > 4 and no anginal symptoms, no SOB - acceptable cardiac risk to proceed.    - ECHO reviewed - normal LVEF, no significant  VHD  pain management  management as per ortho

## 2022-11-15 NOTE — CONSULT NOTE ADULT - TIME BILLING
education and coordination with patient  coordination with cards, ID, ortho team, ED team, OR team, surgical planning
Advanced care planning was discussed with patient and family.  Advanced care planning forms were reviewed and discussed as appropriate.  Differential diagnosis and plan of care discussed with patient after the evaluation.   Pain assessed and judicious use of narcotics when appropriate was discussed.  Importance of Fall prevention discussed.  Counseling on Smoking and Alcohol cessation was offered when appropriate.  Counseling on Diet, exercise, and medication compliance was done.

## 2022-11-15 NOTE — PATIENT PROFILE ADULT - OVER THE PAST TWO WEEKS HAVE YOU FELT DOWN, DEPRESSED OR HOPELESS?
Dr. Yoni Zamora     11/7/2017      [address]   [facility] [id number]                    Dear Mr. Mo    Please review the enclosed information.    Thank you.   no

## 2022-11-15 NOTE — H&P ADULT - ASSESSMENT
77F with right TKA PJI, +MSSA    Plan:  Cardiology management appreciated - please document clearance/optimization for OR   ID recommendations appreciated; Continue Ancef Q8hrs (decreased renal function with Cr 2.19 so dose modified by pharmacy) - Allergy to PCN not concern per ID documentation  FU Preop Labs/T&S/COVID/EKG  FU ESR/CRP: 59/65  FU BCx  FU UA/UCx  WBAT  BLLE Dopplers obtained and negative  CXR obtained, pending official read  DVT ppx; SQH, will hold after midnight prior to procedure; SCDs  Plan for CT R Knee MAZOR protocol once patient arrives to floor  Plan for OR with Dr. Reyes on Wednesday for R TKA Explantation, I&D, 1-Stage Revision versus Antibiotic Spacer Placement  Discussed with attending Dr. Reyes who agrees with plan 77F with right TKA PJI, +MSSA    Plan:  Cardiology management appreciated - please document clearance/optimization for OR   ID recommendations appreciated; Continue Ancef Q8hrs (decreased renal function with Cr 2.19 so dose modified by pharmacy) - Allergy to PCN not concern per ID documentation  FU Preop Labs/T&S/COVID/EKG  FU ESR/CRP: 59/65  FU BCx  FU UA/UCx  WBAT  BLLE Dopplers obtained and negative  CXR obtained, pending official read  DVT ppx; SQH, will hold after midnight prior to procedure; SCDs  Plan for CT R Knee Elijah protocol once patient arrives to floor  Plan for OR with Dr. Reyes on Wednesday for R TKA Explantation, I&D, 1-Stage Revision versus Antibiotic Spacer Placement  Discussed with attending Dr. Reyes who agrees with plan

## 2022-11-15 NOTE — PATIENT PROFILE ADULT - FALL HARM RISK - RISK INTERVENTIONS

## 2022-11-15 NOTE — H&P ADULT - HISTORY OF PRESENT ILLNESS
77F hx R TKA by Dr. Herring on 4/8/22 at [facility] who presents for evaluation of right knee swelling and severe pain, which has been worsening. Patient has been unable to bear weight due to pain. She was instructed to present to the ED for evaluation with Dr. 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 Her initial postoperative period was complicated by wound break down that was treated with Bactrim after cultures were positive for MSSA. Conservative treatment including NSAIDs, activity modification, and assistive devices have been minimally helpful. Otherwise denies numbness/tingling, weakness, falls/trauma, any other acute orthopedic injury or complaints.     Vital Signs Last 24 Hrs  T(C): 37.1 (14 Nov 2022 22:17), Max: 37.1 (14 Nov 2022 22:17)  T(F): 98.7 (14 Nov 2022 22:17), Max: 98.7 (14 Nov 2022 22:17)  HR: 94 (14 Nov 2022 22:17) (82 - 94)  BP: 158/69 (14 Nov 2022 22:17) (158/69 - 173/80)  BP(mean): --  RR: 18 (14 Nov 2022 22:17) (18 - 20)  SpO2: 96% (14 Nov 2022 22:17) (96% - 100%)    Parameters below as of 14 Nov 2022 22:17  Patient On (Oxygen Delivery Method): room air

## 2022-11-15 NOTE — PROGRESS NOTE ADULT - SUBJECTIVE AND OBJECTIVE BOX
Orthopedics     Patient seen and examined at bedside. Pain is controlled. Pt feeling well. No cp sob nausea or vomiting.    Vital Signs Last 24 Hrs  T(C): 36.6 (11-15-22 @ 05:23), Max: 37.1 (11-14-22 @ 22:17)  T(F): 97.8 (11-15-22 @ 05:23), Max: 98.7 (11-14-22 @ 22:17)  HR: 79 (11-15-22 @ 05:23) (79 - 94)  BP: 138/76 (11-15-22 @ 05:23) (138/76 - 173/80)  BP(mean): --  RR: 18 (11-15-22 @ 05:23) (18 - 20)  SpO2: 99% (11-15-22 @ 05:23) (96% - 100%)                        8.7    10.98 )-----------( 390      ( 14 Nov 2022 17:54 )             27.5     14 Nov 2022 17:54    129    |  98     |  34     ----------------------------<  137    4.9     |  18     |  2.19     Ca    9.5        14 Nov 2022 17:54      PT/INR - ( 14 Nov 2022 17:54 )   PT: 11.5 sec;   INR: 1.00 ratio         PTT - ( 14 Nov 2022 17:54 )  PTT:31.9 sec    Exam:  Gen: NAD, resting comfortably  Incision well healed but with overlying collection center of incision with drainage present   +Q/H/EHL/FHL/TA/GS  SILT L2-S1  +DP/PT 2+  Calf NTTP b/l  Compartments soft and compressible    A/P:  77yFemale with right knee PJI MSSA    Cardiology management appreciated - please document clearance/optimization for OR  ID recs appreciated  Ancef Q12 hours due to creatinine clearance until OR  -FU AM labs  FU BCx  FU UCx  -WBAT  -Pain control PRN  -DVT PE ppx: SQH; Hold DVT ppx after midnight for OR tomorrow; SCDs okay  NPO after midnight except medications for OR tomorrow; IVF while NPO  -TTE to be performed by cardology this morning  -Plan for OR tomorrow with Dr. Reyes for explantation of R TKA, I&D, 1-stage revision versus antibiotic spacer placemnt  -Will discuss with attending Dr. Reyes and advise if any changes to plan

## 2022-11-15 NOTE — H&P ADULT - NSHPPHYSICALEXAM_GEN_ALL_CORE
Exam:  General: Awake alert no acute distress  RLE:   Well healed midline surgical incision from previous surgery. No abrasions/lacerations or evidence of trauma.   Swelling of knee appreciated.   Mild TTP over knee; No other bony TTP appreciated  ROM of knee limited due to pain/swelling: approximately 0-70 degrees of flexion  +Q/H/Gsc/TA/EHL/FHL, SILT  DP pulse palpable  Compartments soft and compressible  No calf TTP bilaterally

## 2022-11-15 NOTE — H&P ADULT - NSHPLABSRESULTS_GEN_ALL_CORE
XR imaging of knee deferred as obtained in outpatient setting    Preop labs/T&S/COVID/EKG/CXR ordered    Blood/Urine cultures and UA ordered    ESR/CRP orderd    BLLE Dopplers ordered    TTE to be ordered in morning by following cardiologist                          8.7    10.98 )-----------( 390      ( 14 Nov 2022 17:54 )             27.5

## 2022-11-15 NOTE — CONSULT NOTE ADULT - SUBJECTIVE AND OBJECTIVE BOX
CHIEF COMPLAINT:  Knee pain     HISTORY OF PRESENT ILLNESS:  77F hx R TKA by Dr. Herring on 4/8/22 at [facility] who presents for evaluation of right knee swelling and severe pain, which has been worsening. Patient has been unable to bear weight due to pain. She was instructed to present to the ED for evaluation with Dr. 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 Her initial postoperative period was complicated by wound break down that was treated with Bactrim after cultures were positive for MSSA. Conservative treatment including NSAIDs, activity modification, and assistive devices have been minimally helpful. Otherwise denies numbness/tingling, weakness, falls/trauma, any other acute orthopedic injury or complaints.     Cardiology consulted for pre-op risk assessment.  Pt denies chest pain, SOB, palpitations.    PAST MEDICAL & SURGICAL HISTORY:  HTN (hypertension)    Diabetes mellitus  recently started on Januvia    Hyperlipidemia    History of cholecystectomy  &quot;years ago&quot;    MEDICATIONS:  heparin   Injectable 5000 Unit(s) SubCutaneous every 8 hours  losartan 100 milliGRAM(s) Oral daily  metoprolol succinate ER 50 milliGRAM(s) Oral daily    ceFAZolin   IVPB      ceFAZolin   IVPB 1000 milliGRAM(s) IV Intermittent every 12 hours    acetaminophen     Tablet .. 650 milliGRAM(s) Oral every 6 hours PRN  ondansetron Injectable 4 milliGRAM(s) IV Push every 6 hours PRN    pantoprazole    Tablet 40 milliGRAM(s) Oral before breakfast    atorvastatin 80 milliGRAM(s) Oral at bedtime    ascorbic acid 500 milliGRAM(s) Oral two times a day  folic acid 1 milliGRAM(s) Oral daily  lactated ringers. 1000 milliLiter(s) IV Continuous <Continuous>  multivitamin 1 Tablet(s) Oral daily  raloxifene 60 milliGRAM(s) Oral daily    FAMILY HISTORY:    SOCIAL HISTORY:    [ ] Non-smoker  [ ] Smoker  [ ] Alcohol    Allergies    penicillins (Short breath (Severe))    Intolerances    REVIEW OF SYSTEMS:  CONSTITUTIONAL: No fever, weight loss, + fatigue  EYES: No eye pain, visual disturbances, or discharge  ENMT:  No difficulty hearing, tinnitus, vertigo; No sinus or throat pain  NECK: No pain or stiffness  RESPIRATORY: No cough, wheezing, chills or hemoptysis; No Shortness of Breath  CARDIOVASCULAR: No chest pain, palpitations, passing out, dizziness, or leg swelling  GASTROINTESTINAL: No abdominal or epigastric pain. No nausea, vomiting, or hematemesis; No diarrhea or constipation. No melena or hematochezia.  GENITOURINARY: No dysuria, frequency, hematuria, or incontinence  NEUROLOGICAL: No headaches, memory loss, loss of strength, numbness, or tremors  SKIN: No itching, burning, rashes, or lesions   LYMPH Nodes: No enlarged glands  ENDOCRINE: No heat or cold intolerance; No hair loss  MUSCULOSKELETAL: No joint pain or swelling; No muscle, back, or extremity pain  PSYCHIATRIC: No depression, anxiety, mood swings, or difficulty sleeping  HEME/LYMPH: No easy bruising, or bleeding gums  ALLERY AND IMMUNOLOGIC: No hives or eczema	    [ ] All others negative	  [ ] Unable to obtain    PHYSICAL EXAM:  T(C): 36.6 (11-15-22 @ 05:23), Max: 37.1 (11-14-22 @ 22:17)  HR: 79 (11-15-22 @ 05:23) (79 - 94)  BP: 138/76 (11-15-22 @ 05:23) (138/76 - 173/80)  RR: 18 (11-15-22 @ 05:23) (18 - 20)  SpO2: 99% (11-15-22 @ 05:23) (96% - 100%)  Wt(kg): --  I&O's Summary    Appearance: NAD  HEENT: Normal oral mucosa, PERRL, EOMI	  Lymphatic: No lymphadenopathy  Cardiovascular: Normal S1 S2, No JVD, No murmurs, No edema  Respiratory: Lungs clear to auscultation	  Psychiatry: A & O x 3, Mood & affect appropriate  Gastrointestinal:  Soft, Non-tender, + BS	  Skin: No rashes, No ecchymoses, No cyanosis - right knee healed incision w/ swelling  	  Neurologic: Non-focal  Extremities: Normal range of motion, No clubbing, cyanosis or edema  Vascular: Peripheral pulses palpable 2+ bilaterally    TELEMETRY: 	    ECG:  	  RADIOLOGY:  < from: [facility] Duplex Lower Ext Vein Scan, Bilat (11.14.22 @ 18:41) >  ACC: [id number] EXAM:  DUPLEX SCAN EXT VEINS LOWER BI                          PROCEDURE DATE:  11/14/2022      INTERPRETATION:  CLINICAL INFORMATION: Bilateral calf pain    COMPARISON: None available.    TECHNIQUE: Duplex sonography of the BILATERAL LOWER extremity veins with   color and spectral Doppler, with and without compression.    FINDINGS:    RIGHT:  Normal compressibility of the RIGHT common femoral, femoral and popliteal   veins.  Doppler examination shows normal spontaneous and phasic flow.  No RIGHT calf vein thrombosis is detected.    LEFT:  Normal compressibility of the LEFT common femoral, femoral and popliteal   veins.  Doppler examination shows normal spontaneous and phasic flow.  No LEFT calf vein thrombosis is detected.    IMPRESSION:  No evidence of deep venous thrombosis in either lower extremity.    --- End of Report ---    < end of copied text >  < from: Xray Chest 1 View- PORTABLE-Urgent (Xray Chest 1 View- PORTABLE-Urgent .) (11.14.22 @ 16:53) >  ACC: [id number] EXAM:  XR CHEST PORTABLE URGENT 1V                          PROCEDURE DATE:  11/14/2022      INTERPRETATION:  EXAMINATION: XR CHEST URGENT    CLINICAL INDICATION: preop    TECHNIQUE: Single frontal, portable view of the chest was obtained.    COMPARISON: Chest x-ray April 1, 2018  FINDINGS:  LUNGS: The lungs are clear.  PLEURA: No pleural effusion or pneumothorax.  HEART AND MEDIASTINUM: Heart size is within normal limits.  SKELETON: There is no acute osseus abnormality,.    IMPRESSION:  Clear lungs.    --- End of Report ---    < end of copied text >    OTHER: 	  	  LABS:	 	    CARDIAC MARKERS:                    8.4    9.93  )-----------( 339      ( 15 Nov 2022 08:04 )             25.4     11-15    131<L>  |  99  |  30<H>  ----------------------------<  164<H>  5.5<H>   |  20<L>  |  2.01<H>    Ca    9.1      15 Nov 2022 08:04    proBNP:   Lipid Profile:   HgA1c:   TSH:

## 2022-11-15 NOTE — CONSULT NOTE ADULT - ASSESSMENT
77F hx R TKA by Dr. Herring on 4/8/22 at [facility] who presents for evaluation of right knee swelling and severe pain, which has been worsening.
This patient has a painful right total knee arthroplasty with MSSA positive on aspiration. She has failed a course of nonoperative management and would like to proceed with a right total knee arthroplasty explantation, irrigation debridement and placement of a functional revision total knee arthroplasty as a spacer versus a two-stage revision total knee arthroplasty with a static or articulating antibiotic spacer. This will be an intraoperative decision. She understands that the chance that we can clear this infection is approximately 70% given that we cannot preoperatively identify the organism.    The patient is an appropriate candidate for consideration of right revision total knee arthroplasty as a functional antibiotic spacer versus a two-stage antibiotic spacer which may be a static spacer or a articulating spacer. This recommendation is based on the patient's pain, function, and bone stock. An extensive discussion was conducted of the natural history of this particular problem and the variety of surgical and non-surgical treatment options available to the patient. A risk/benefit analysis was discussed with the patient reviewing the advantages and disadvantages of surgical intervention at this time. A full explanation was given of the nature and the purpose of the procedure and anesthesia, its benefits, possible alternative methods of diagnosis or treatment, the risks involved, the possibility of complications, the foreseeable consequences of the procedure and the possible results of the non-treatment. I reviewed the plan of care and I also used a model of a revision joint replacement implant equivalent to the one that will be used for their revision total joint replacement. The ability to secure the implant utilizing cement or cementless (press-fit) was discussed with the patient. The patient agrees with the plan of care, as well as the use of implants for their revision joint replacement. She understands that if a cement spacer is utilized to left modulate her weightbearing will have limited range of motion of her knee while the spacer is in place and will require multiple future operations.    No guarantee or assurance was made as to the results that may be obtained. Specifically, the risks were identified to include, but are not limited to the following: Infection, phlebitis, pulmonary embolism, death, paralysis, dislocation, pain, stiffness, instability, limp, weakness, breakage, leg-length inequality, uncontrolled bleeding, nerve injury, blood vessel injury, pressure sores, anesthetic risks, delayed healing of wound and bone, and wear and loosening. Further discussion was undertaken with the patient about the details of surgical preparation, treatment, and postoperative rehabilitation including medical clearance, autotransfusion, the hospital course, and the postoperative rehabilitation involved. Reimplantation may require cemented or cementless components, or both, depending upon a variety of factors that must be assessed at the time of surgery. The need for bone graft (either autograft or allograft) to enhance the chance for success of the procedure(s) was discussed. All in all, I feel that this patient is a good candidate for surgical reconstruction.     bcx x 2   ucx  cards consult (Akila)  ID consult (Abbey)  plan OR  preop labs  doppler  echo  consent
77 year old Female with PMHx HTN managed on telmisartan, Pre-diabetes, R TKA by Dr. Herring on 4/8/22 at [facility] who presents for evaluation of right knee swelling and severe pain, which has been worsening. She was instructed to present to the ED for evaluation with Dr. 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 Her initial postoperative period was complicated by wound break down that was treated with Bactrim after cultures were positive for MSSA. Chart review revealed treatment with IV vanco via PICC line. complicated by young 1.98 on 10/3, elevated vanco trough, and body rash. Vanco stopped 10/9 with improving cr 1.5 on 10/17. Patient states she was restarted on meloxicam and bactrim on 10/29 and has been taking up until admission. Noticed with creatinine 2.1 g/dL and hyperkalemia. Renal consult called.      1- YOUNG on CKD III  2- hyperkalemia  3- HTN  4- R knee PJI MSSA      suspect baseline CKD III given GFR 51 in 3/2022  young in setting of bactrim and NSAID use  hold bactrim and NSAIDs  losartan 100 mg given this am  hyperkalemia, to have lokelma 5 mg x 1   repeat bmp in 4 hours after lokelma  renal sono without obstruction  low k diet  HTN losartan d/c'd  continue BB  add norvasc 5 mg daily  trend bp  strict I/O  check U/A, ordered  trend creatinine and electrolytes, bmp later today  plan for OR tomorrow with ortho pending improving K and creatinine, she will be at risk for worsening renal function with OR, this was explained to patient, and she verbalized understanding. 
78 yo F HTN, DM2, HLD, R knee arthroplasty ~ 7 months ago, presenting to hospital with R knee PJI  2-3 months ago there was concern for R sided PJI, and patient was given a prolonged course of Daptomycin  She had an allergic reaction to Vanco (rash)  Also gives history of a remote PCN allergy--that was life threatening with swelling  After course of Dapto, patient still had ongoing symptoms so arthrocentesis was repeated which showed MSSA growth and 20,900 cells  Planned for explant of hardware on this admission  Overall, PJI, MSSA, R knee pain  - Monitor off abx for now  - From ID perspective, can monitor off abx as long as patient does not have acute signs of sepsis (in effort to get optimal OR cultures); if Ortho prefers to cover with antibiotics or patient has signs acute sepsis, then would start Cefazolin 2g q 8 (depending on CrCl)  - Check BCXs x 2  - Check routine labs, ESR, CRP, CMP, CBC, etc...  - F/U Ortho eval  - Hold on patient home Bactrim dose    Venu Potter MD  Contact on TEAMS messaging from 9am - 5pm  From 5pm-9am, on weekends, or if no response call [phone]

## 2022-11-15 NOTE — CONSULT NOTE ADULT - SUBJECTIVE AND OBJECTIVE BOX
seen and examined with Dr. Escoto. U/A, renal sono ordered. hold losartan. full consult to follow seen and examined with Dr. Escoto. U/A, renal sono ordered. hold losartan. full consult to follow    [facility]  [phone]  NEPHROLOGY      INITIAL CONSULT NOTE  --------------------------------------------------------------------------------  HPI:    77 year old Female with PMHx HTN managed on telmisartan, Pre-diabetes, R TKA by Dr. Herring on 4/8/22 at [facility] who presents for evaluation of right knee swelling and severe pain, which has been worsening. She was instructed to present to the ED for evaluation with Dr. 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 Her initial postoperative period was complicated by wound break down that was treated with Bactrim after cultures were positive for MSSA. Chart review revealed treatment with IV vanco via PICC line. complicated by le 1.98 on 10/3, elevated vanco trough, and body rash. Vanco stopped 10/9 with improving cr 1.5 on 10/17. Patient states she was restarted on meloxicam and bactrim on 10/29 and has been taking up until admission. Noticed with creatinine 2.1 g/dL and hyperkalemia. Renal consult called.    PAST HISTORY  --------------------------------------------------------------------------------  PAST MEDICAL & SURGICAL HISTORY:  HTN (hypertension)      Diabetes mellitus  recently started on Januvia      Hyperlipidemia      History of cholecystectomy  &quot;years ago&quot;        FAMILY HISTORY:    PAST SOCIAL HISTORY:  +NSAID use    ALLERGIES & MEDICATIONS  --------------------------------------------------------------------------------  Allergies    penicillins (Short breath (Severe))    Intolerances      Standing Inpatient Medications  ascorbic acid 500 milliGRAM(s) Oral two times a day  atorvastatin 80 milliGRAM(s) Oral at bedtime  ceFAZolin   IVPB      ceFAZolin   IVPB 1000 milliGRAM(s) IV Intermittent every 12 hours  folic acid 1 milliGRAM(s) Oral daily  heparin   Injectable 5000 Unit(s) SubCutaneous every 8 hours  metoprolol succinate ER 50 milliGRAM(s) Oral daily  multivitamin 1 Tablet(s) Oral daily  pantoprazole    Tablet 40 milliGRAM(s) Oral before breakfast  raloxifene 60 milliGRAM(s) Oral daily  sodium chloride 0.9%. 500 milliLiter(s) IV Continuous <Continuous>  sodium zirconium cyclosilicate 5 Gram(s) Oral once    PRN Inpatient Medications  acetaminophen     Tablet .. 650 milliGRAM(s) Oral every 6 hours PRN  ondansetron Injectable 4 milliGRAM(s) IV Push every 6 hours PRN      REVIEW OF SYSTEMS  --------------------------------------------------------------------------------  Gen: No fevers/chills   Head/Eyes/Ears/Mouth: No headache; Normal hearing;  No sinus pain/discomfort, sore throat  Respiratory: No dyspnea, cough,   CV: No chest pain, orthopnea  GI: No abdominal pain, diarrhea, nausea, vomiting,  : No dysuria, decrease urination   MSK: +R knee pain.   Neuro: +weakness. No dizziness/lightheadedness,   also with no edema     VITALS/PHYSICAL EXAM  --------------------------------------------------------------------------------  T(C): 36.6 (11-15-22 @ 11:30), Max: 37.1 (11-14-22 @ 22:17)  HR: 71 (11-15-22 @ 11:30) (71 - 94)  BP: 169/75 (11-15-22 @ 11:30) (138/76 - 173/80)  RR: 17 (11-15-22 @ 11:30) (17 - 20)  SpO2: 99% (11-15-22 @ 11:30) (96% - 100%)  Wt(kg): --  Height (cm): 154.9 (11-14-22 @ 13:00)  Weight (kg): 59 (11-14-22 @ 13:00)  BMI (kg/m2): 24.6 (11-14-22 @ 13:00)  BSA (m2): 1.57 (11-14-22 @ 13:00)      Physical Exam:  	Gen: Non toxic comfortable appearing   	Pulm: decrease bs  no rales or ronchi or wheezing  	CV: No JVD. RRR, S1S2; no rub  	Back: No CVA tenderness; no sacral edema  	Abd: +BS, soft, nontender/nondistended  	: ? suprapubic tenderness  	UE: Warm, no cyanosis  no clubbing,  no edema;  	LE: Warm, no cyanosis  no clubbing, no edema, R knee erythremia, swollen  	Neuro: alert and oriented. speech coherent   	Skin: Warm, no decrease skin turgor     LABS/STUDIES  --------------------------------------------------------------------------------              8.4    9.93  >-----------<  339      [11-15-22 @ 08:04]              25.4     131  |  99  |  30  ----------------------------<  164      [11-15-22 @ 08:04]  5.5   |  20  |  2.01        Ca     9.1     [11-15-22 @ 08:04]      PT/INR: PT 12.1 , INR 1.05       [11-15-22 @ 08:04]  PTT: 31.9       [11-15-22 @ 08:04]      Creatinine Trend:  SCr 2.01 [11-15 @ 08:04]  SCr 2.19 [11-14 @ 17:54]    Urinalysis - [03-29-22 @ 13:35]      Color Light Yellow / Appearance Clear / SG 1.015 / pH 5.5      Gluc Trace / Ketone Negative  / Bili Negative / Urobili <2 mg/dL       Blood Negative / Protein Trace / Leuk Est Negative / Nitrite Negative      RBC 1 / WBC 4 / Hyaline 2 / Gran  / Sq Epi  / Non Sq Epi 3 / Bacteria Occasional    < from: US Kidney and Bladder (11.15.22 @ 11:05) >  ACC: [id number] EXAM:  US KIDNEYS AND BLADDER                          PROCEDURE DATE:  11/15/2022          INTERPRETATION:  CLINICAL INFORMATION: Renal failure, acute kidney   injury, and hypertension.    COMPARISON: None available.    TECHNIQUE: Sonography of the kidneys and bladder.    FINDINGS:  Right kidney: 10.1 cm. No hydronephrosis. No renal mass or calculus   visualized.    Left kidney:  9.6 cm. No hydronephrosis. No renal mass or calculus   visualized.    Urinary bladder: Within normal limits.    IMPRESSION:    No hydronephrosis.        --- End of Report ---    < end of copied text >

## 2022-11-15 NOTE — CONSULT NOTE ADULT - NS ATTEND AMEND GEN_ALL_CORE FT
pt with R knee joint - Prosthetic joint infection. now requiring explant . she has been on bactrim nsaids and losartan as well as infection leading to YOUNG   dc bactrim   dc all nsaids  hold losartan   hyperkalemia to receive lokelma 5 g po and to repeat k   will stand at risk for worsening renal function with surgery however, she requires surgery for infection control   pt is aware of risk of worsening renal function   in addition has hyponatremia trend na for now  ancef as per ID recommendations   pt is for OR as planned by ortho

## 2022-11-15 NOTE — H&P ADULT - ATTENDING COMMENTS
I agree with the above note and have personally seen and examined this patient. All pertinent films have been reviewed. Please refer to clinical documentation of the history, physical examinations, data summary, and both assessment and plan as documented above and with which I agree.    Alvarado Reyes MD  Attending Orthopedic Surgeon

## 2022-11-15 NOTE — PATIENT PROFILE ADULT - NSTRANSFERDENTURES_GEN_A_NUR
Brief Postoperative Note      Patient: Austen Spears  YOB: 1982  MRN: [id number]    Date of Procedure: 6/1/2021    Pre-Op Diagnosis: LEFT HALLUX RIGIDUS    Post-Op Diagnosis: Same       Procedure(s):  1ST MTP JOINT FUSION  ([facility], ORTHO LOC PLATE, REAMER BALL & SOCKET, FEMORAL NERVE BLOCK, 3080 TABLE, SUPINE, C-ARM)    Surgeon(s):  Hi Kraus DO    Assistant:  Resident: Kelly Davidson DO; Deep Morton DO    Anesthesia: General    Estimated Blood Loss (mL): 30 mL    Fluids: 1000 mL crystalloids    TT: 80 mins    Complications: None    Specimens:   * No specimens in log *    Implants:  * No implants in log *      Drains: * No LDAs found *    Findings: see op note    Electronically signed by Kelly Davidson DO on 6/1/2021 at 5:29 PM full/upper/lower

## 2022-11-16 ENCOUNTER — TRANSCRIPTION ENCOUNTER (OUTPATIENT)
Age: 77
End: 2022-11-16

## 2022-11-16 ENCOUNTER — APPOINTMENT (OUTPATIENT)
Dept: ORTHOPEDIC SURGERY | Facility: HOSPITAL | Age: 77
End: 2022-11-16

## 2022-11-16 LAB
ANION GAP SERPL CALC-SCNC: 11 MMOL/L — SIGNIFICANT CHANGE UP (ref 5–17)
ANION GAP SERPL CALC-SCNC: 12 MMOL/L — SIGNIFICANT CHANGE UP (ref 5–17)
APTT BLD: 39.7 SEC — HIGH (ref 27.5–35.5)
BACTERIA FLD CULT: ABNORMAL
BLD GP AB SCN SERPL QL: NEGATIVE — SIGNIFICANT CHANGE UP
BUN SERPL-MCNC: 24 MG/DL — HIGH (ref 7–23)
BUN SERPL-MCNC: 28 MG/DL — HIGH (ref 7–23)
CALCIUM SERPL-MCNC: 8.1 MG/DL — LOW (ref 8.4–10.5)
CALCIUM SERPL-MCNC: 9.1 MG/DL — SIGNIFICANT CHANGE UP (ref 8.4–10.5)
CHLORIDE SERPL-SCNC: 101 MMOL/L — SIGNIFICANT CHANGE UP (ref 96–108)
CHLORIDE SERPL-SCNC: 105 MMOL/L — SIGNIFICANT CHANGE UP (ref 96–108)
CO2 SERPL-SCNC: 17 MMOL/L — LOW (ref 22–31)
CO2 SERPL-SCNC: 19 MMOL/L — LOW (ref 22–31)
CREAT SERPL-MCNC: 1.5 MG/DL — HIGH (ref 0.5–1.3)
CREAT SERPL-MCNC: 2.01 MG/DL — HIGH (ref 0.5–1.3)
EGFR: 25 ML/MIN/1.73M2 — LOW
EGFR: 35 ML/MIN/1.73M2 — LOW
FERRITIN SERPL-MCNC: 429 NG/ML — HIGH (ref 15–150)
GLUCOSE SERPL-MCNC: 120 MG/DL — HIGH (ref 70–99)
GLUCOSE SERPL-MCNC: 206 MG/DL — HIGH (ref 70–99)
HCT VFR BLD CALC: 25.8 % — LOW (ref 34.5–45)
HCT VFR BLD CALC: 35.4 % — SIGNIFICANT CHANGE UP (ref 34.5–45)
HGB BLD-MCNC: 11.7 G/DL — SIGNIFICANT CHANGE UP (ref 11.5–15.5)
HGB BLD-MCNC: 8.1 G/DL — LOW (ref 11.5–15.5)
INR BLD: 0.98 RATIO — SIGNIFICANT CHANGE UP (ref 0.88–1.16)
IRON SATN MFR SERPL: 24 % — SIGNIFICANT CHANGE UP (ref 14–50)
IRON SATN MFR SERPL: 52 UG/DL — SIGNIFICANT CHANGE UP (ref 30–160)
MCHC RBC-ENTMCNC: 28.1 PG — SIGNIFICANT CHANGE UP (ref 27–34)
MCHC RBC-ENTMCNC: 28.4 PG — SIGNIFICANT CHANGE UP (ref 27–34)
MCHC RBC-ENTMCNC: 31.4 GM/DL — LOW (ref 32–36)
MCHC RBC-ENTMCNC: 33.1 GM/DL — SIGNIFICANT CHANGE UP (ref 32–36)
MCV RBC AUTO: 85.9 FL — SIGNIFICANT CHANGE UP (ref 80–100)
MCV RBC AUTO: 89.6 FL — SIGNIFICANT CHANGE UP (ref 80–100)
NRBC # BLD: 0 /100 WBCS — SIGNIFICANT CHANGE UP (ref 0–0)
NRBC # BLD: 0 /100 WBCS — SIGNIFICANT CHANGE UP (ref 0–0)
PLATELET # BLD AUTO: 247 K/UL — SIGNIFICANT CHANGE UP (ref 150–400)
PLATELET # BLD AUTO: 356 K/UL — SIGNIFICANT CHANGE UP (ref 150–400)
POTASSIUM SERPL-MCNC: 4.8 MMOL/L — SIGNIFICANT CHANGE UP (ref 3.5–5.3)
POTASSIUM SERPL-MCNC: 4.9 MMOL/L — SIGNIFICANT CHANGE UP (ref 3.5–5.3)
POTASSIUM SERPL-SCNC: 4.8 MMOL/L — SIGNIFICANT CHANGE UP (ref 3.5–5.3)
POTASSIUM SERPL-SCNC: 4.9 MMOL/L — SIGNIFICANT CHANGE UP (ref 3.5–5.3)
PROTHROM AB SERPL-ACNC: 11.4 SEC — SIGNIFICANT CHANGE UP (ref 10.5–13.4)
RBC # BLD: 2.88 M/UL — LOW (ref 3.8–5.2)
RBC # BLD: 4.12 M/UL — SIGNIFICANT CHANGE UP (ref 3.8–5.2)
RBC # FLD: 15.7 % — HIGH (ref 10.3–14.5)
RBC # FLD: 15.8 % — HIGH (ref 10.3–14.5)
RH IG SCN BLD-IMP: POSITIVE — SIGNIFICANT CHANGE UP
SODIUM SERPL-SCNC: 131 MMOL/L — LOW (ref 135–145)
SODIUM SERPL-SCNC: 134 MMOL/L — LOW (ref 135–145)
TIBC SERPL-MCNC: 216 UG/DL — LOW (ref 220–430)
UIBC SERPL-MCNC: 164 UG/DL — SIGNIFICANT CHANGE UP (ref 110–370)
WBC # BLD: 19.42 K/UL — HIGH (ref 3.8–10.5)
WBC # BLD: 9.45 K/UL — SIGNIFICANT CHANGE UP (ref 3.8–10.5)
WBC # FLD AUTO: 19.42 K/UL — HIGH (ref 3.8–10.5)
WBC # FLD AUTO: 9.45 K/UL — SIGNIFICANT CHANGE UP (ref 3.8–10.5)

## 2022-11-16 PROCEDURE — 27310 EXPLORATION OF KNEE JOINT: CPT | Mod: 59,RT

## 2022-11-16 PROCEDURE — 27488 REMOVAL OF KNEE PROSTHESIS: CPT | Mod: RT,22

## 2022-11-16 PROCEDURE — 73560 X-RAY EXAM OF KNEE 1 OR 2: CPT | Mod: 26,RT

## 2022-11-16 PROCEDURE — 11981 INSERTION DRUG DLVR IMPLANT: CPT | Mod: RT

## 2022-11-16 PROCEDURE — 99232 SBSQ HOSP IP/OBS MODERATE 35: CPT

## 2022-11-16 RX ORDER — SODIUM CHLORIDE 9 MG/ML
1000 INJECTION INTRAMUSCULAR; INTRAVENOUS; SUBCUTANEOUS
Refills: 0 | Status: DISCONTINUED | OUTPATIENT
Start: 2022-11-16 | End: 2022-11-16

## 2022-11-16 RX ORDER — SODIUM CHLORIDE 9 MG/ML
500 INJECTION INTRAMUSCULAR; INTRAVENOUS; SUBCUTANEOUS ONCE
Refills: 0 | Status: COMPLETED | OUTPATIENT
Start: 2022-11-16 | End: 2022-11-17

## 2022-11-16 RX ORDER — PANTOPRAZOLE SODIUM 20 MG/1
40 TABLET, DELAYED RELEASE ORAL
Refills: 0 | Status: DISCONTINUED | OUTPATIENT
Start: 2022-11-16 | End: 2022-11-22

## 2022-11-16 RX ORDER — METOPROLOL TARTRATE 50 MG
50 TABLET ORAL DAILY
Refills: 0 | Status: DISCONTINUED | OUTPATIENT
Start: 2022-11-16 | End: 2022-11-16

## 2022-11-16 RX ORDER — LOSARTAN POTASSIUM 100 MG/1
100 TABLET, FILM COATED ORAL DAILY
Refills: 0 | Status: DISCONTINUED | OUTPATIENT
Start: 2022-11-16 | End: 2022-11-17

## 2022-11-16 RX ORDER — ATORVASTATIN CALCIUM 80 MG/1
80 TABLET, FILM COATED ORAL AT BEDTIME
Refills: 0 | Status: DISCONTINUED | OUTPATIENT
Start: 2022-11-16 | End: 2022-11-22

## 2022-11-16 RX ORDER — CEFAZOLIN SODIUM 1 G
2000 VIAL (EA) INJECTION EVERY 8 HOURS
Refills: 0 | Status: DISCONTINUED | OUTPATIENT
Start: 2022-11-16 | End: 2022-11-18

## 2022-11-16 RX ORDER — ACETAMINOPHEN 500 MG
1000 TABLET ORAL ONCE
Refills: 0 | Status: COMPLETED | OUTPATIENT
Start: 2022-11-17 | End: 2022-11-16

## 2022-11-16 RX ORDER — OXYCODONE HYDROCHLORIDE 5 MG/1
10 TABLET ORAL EVERY 4 HOURS
Refills: 0 | Status: DISCONTINUED | OUTPATIENT
Start: 2022-11-16 | End: 2022-11-22

## 2022-11-16 RX ORDER — METOPROLOL TARTRATE 50 MG
50 TABLET ORAL DAILY
Refills: 0 | Status: DISCONTINUED | OUTPATIENT
Start: 2022-11-16 | End: 2022-11-17

## 2022-11-16 RX ORDER — AMLODIPINE BESYLATE 2.5 MG/1
5 TABLET ORAL DAILY
Refills: 0 | Status: DISCONTINUED | OUTPATIENT
Start: 2022-11-16 | End: 2022-11-16

## 2022-11-16 RX ORDER — ONDANSETRON 8 MG/1
4 TABLET, FILM COATED ORAL EVERY 6 HOURS
Refills: 0 | Status: DISCONTINUED | OUTPATIENT
Start: 2022-11-16 | End: 2022-11-22

## 2022-11-16 RX ORDER — HYDROMORPHONE HYDROCHLORIDE 2 MG/ML
0.5 INJECTION INTRAMUSCULAR; INTRAVENOUS; SUBCUTANEOUS
Refills: 0 | Status: DISCONTINUED | OUTPATIENT
Start: 2022-11-16 | End: 2022-11-17

## 2022-11-16 RX ORDER — POLYETHYLENE GLYCOL 3350 17 G/17G
17 POWDER, FOR SOLUTION ORAL AT BEDTIME
Refills: 0 | Status: DISCONTINUED | OUTPATIENT
Start: 2022-11-16 | End: 2022-11-22

## 2022-11-16 RX ORDER — SENNA PLUS 8.6 MG/1
2 TABLET ORAL AT BEDTIME
Refills: 0 | Status: DISCONTINUED | OUTPATIENT
Start: 2022-11-16 | End: 2022-11-22

## 2022-11-16 RX ORDER — APIXABAN 2.5 MG/1
2.5 TABLET, FILM COATED ORAL
Refills: 0 | Status: DISCONTINUED | OUTPATIENT
Start: 2022-11-17 | End: 2022-11-22

## 2022-11-16 RX ORDER — ACETAMINOPHEN 500 MG
975 TABLET ORAL EVERY 8 HOURS
Refills: 0 | Status: DISCONTINUED | OUTPATIENT
Start: 2022-11-18 | End: 2022-11-22

## 2022-11-16 RX ORDER — HYDROMORPHONE HYDROCHLORIDE 2 MG/ML
0.5 INJECTION INTRAMUSCULAR; INTRAVENOUS; SUBCUTANEOUS ONCE
Refills: 0 | Status: DISCONTINUED | OUTPATIENT
Start: 2022-11-16 | End: 2022-11-22

## 2022-11-16 RX ORDER — OXYCODONE HYDROCHLORIDE 5 MG/1
5 TABLET ORAL EVERY 4 HOURS
Refills: 0 | Status: DISCONTINUED | OUTPATIENT
Start: 2022-11-16 | End: 2022-11-22

## 2022-11-16 RX ORDER — MAGNESIUM HYDROXIDE 400 MG/1
30 TABLET, CHEWABLE ORAL DAILY
Refills: 0 | Status: DISCONTINUED | OUTPATIENT
Start: 2022-11-16 | End: 2022-11-22

## 2022-11-16 RX ORDER — AMLODIPINE BESYLATE 2.5 MG/1
10 TABLET ORAL DAILY
Refills: 0 | Status: DISCONTINUED | OUTPATIENT
Start: 2022-11-16 | End: 2022-11-16

## 2022-11-16 RX ORDER — ACETAMINOPHEN 500 MG
1000 TABLET ORAL ONCE
Refills: 0 | Status: COMPLETED | OUTPATIENT
Start: 2022-11-17 | End: 2022-11-17

## 2022-11-16 RX ORDER — SODIUM CHLORIDE 9 MG/ML
500 INJECTION INTRAMUSCULAR; INTRAVENOUS; SUBCUTANEOUS ONCE
Refills: 0 | Status: DISCONTINUED | OUTPATIENT
Start: 2022-11-16 | End: 2022-11-18

## 2022-11-16 RX ORDER — SODIUM CHLORIDE 9 MG/ML
1000 INJECTION INTRAMUSCULAR; INTRAVENOUS; SUBCUTANEOUS
Refills: 0 | Status: DISCONTINUED | OUTPATIENT
Start: 2022-11-16 | End: 2022-11-18

## 2022-11-16 RX ORDER — RALOXIFENE HYDROCHLORIDE 60 MG/1
60 TABLET, COATED ORAL DAILY
Refills: 0 | Status: DISCONTINUED | OUTPATIENT
Start: 2022-11-16 | End: 2022-11-22

## 2022-11-16 RX ORDER — TRAMADOL HYDROCHLORIDE 50 MG/1
50 TABLET ORAL EVERY 6 HOURS
Refills: 0 | Status: DISCONTINUED | OUTPATIENT
Start: 2022-11-16 | End: 2022-11-22

## 2022-11-16 RX ADMIN — Medication 650 MILLIGRAM(S): at 10:45

## 2022-11-16 RX ADMIN — Medication 650 MILLIGRAM(S): at 10:15

## 2022-11-16 RX ADMIN — Medication 100 MILLIGRAM(S): at 11:19

## 2022-11-16 RX ADMIN — Medication 1 TABLET(S): at 11:20

## 2022-11-16 RX ADMIN — SODIUM CHLORIDE 75 MILLILITER(S): 9 INJECTION INTRAMUSCULAR; INTRAVENOUS; SUBCUTANEOUS at 03:35

## 2022-11-16 RX ADMIN — Medication 650 MILLIGRAM(S): at 01:22

## 2022-11-16 RX ADMIN — SODIUM CHLORIDE 75 MILLILITER(S): 9 INJECTION INTRAMUSCULAR; INTRAVENOUS; SUBCUTANEOUS at 10:16

## 2022-11-16 RX ADMIN — Medication 1 MILLIGRAM(S): at 11:20

## 2022-11-16 RX ADMIN — RALOXIFENE HYDROCHLORIDE 60 MILLIGRAM(S): 60 TABLET, COATED ORAL at 11:19

## 2022-11-16 RX ADMIN — AMLODIPINE BESYLATE 10 MILLIGRAM(S): 2.5 TABLET ORAL at 10:15

## 2022-11-16 RX ADMIN — Medication 650 MILLIGRAM(S): at 00:52

## 2022-11-16 RX ADMIN — TRAMADOL HYDROCHLORIDE 50 MILLIGRAM(S): 50 TABLET ORAL at 23:40

## 2022-11-16 RX ADMIN — Medication 400 MILLIGRAM(S): at 23:52

## 2022-11-16 RX ADMIN — Medication 500 MILLIGRAM(S): at 11:20

## 2022-11-16 RX ADMIN — TRAMADOL HYDROCHLORIDE 50 MILLIGRAM(S): 50 TABLET ORAL at 23:20

## 2022-11-16 NOTE — BRIEF OPERATIVE NOTE - NSICDXBRIEFPROCEDURE_GEN_ALL_CORE_FT
PROCEDURES:  Revision of femoral and entire tibial component of total knee arthroplasty 16-Nov-2022 22:27:21  Ladarius Richmond

## 2022-11-16 NOTE — PROGRESS NOTE ADULT - SUBJECTIVE AND OBJECTIVE BOX
[facility]   [phone]  RENAL FOLLOW UP NOTE  --------------------------------------------------------------------------------  Chief Complaint:    24 hour events/subjective:    seen earlier. c/o pain right knee     PAST HISTORY  --------------------------------------------------------------------------------  No significant changes to PMH, PSH, FHx, SHx, unless otherwise noted    ALLERGIES & MEDICATIONS  --------------------------------------------------------------------------------  Allergies    penicillins (Short breath (Severe))    Intolerances      Standing Inpatient Medications  amLODIPine   Tablet 10 milliGRAM(s) Oral daily  ascorbic acid 500 milliGRAM(s) Oral two times a day  atorvastatin 80 milliGRAM(s) Oral at bedtime  ceFAZolin   IVPB      ceFAZolin   IVPB 1000 milliGRAM(s) IV Intermittent every 12 hours  folic acid 1 milliGRAM(s) Oral daily  influenza  Vaccine (HIGH DOSE) 0.7 milliLiter(s) IntraMuscular once  metoprolol succinate ER 50 milliGRAM(s) Oral daily  multivitamin 1 Tablet(s) Oral daily  pantoprazole    Tablet 40 milliGRAM(s) Oral before breakfast  raloxifene 60 milliGRAM(s) Oral daily  sodium chloride 0.9%. 1000 milliLiter(s) IV Continuous <Continuous>    PRN Inpatient Medications  acetaminophen     Tablet .. 650 milliGRAM(s) Oral every 6 hours PRN  ondansetron Injectable 4 milliGRAM(s) IV Push every 6 hours PRN      REVIEW OF SYSTEMS  --------------------------------------------------------------------------------    Gen: denies  fevers/chills,  CVS: denies chest pain/palpitations  Resp: denies SOB/Cough  GI: Denies N/V/Abd pain  : Denies dysuria/oliguria/hematuria      VITALS/PHYSICAL EXAM  --------------------------------------------------------------------------------  T(C): 36.8 (11-16-22 @ 16:40), Max: 36.9 (11-15-22 @ 20:02)  HR: 89 (11-16-22 @ 16:40) (52 - 89)  BP: 181/74 (11-16-22 @ 16:40) (136/63 - 181/74)  RR: 16 (11-16-22 @ 16:40) (16 - 18)  SpO2: 99% (11-16-22 @ 16:40) (97% - 99%)  Wt(kg): --  Height (cm): 154.9 (11-16-22 @ 16:40)  Weight (kg): 59 (11-16-22 @ 16:40)  BMI (kg/m2): 24.6 (11-16-22 @ 16:40)  BSA (m2): 1.57 (11-16-22 @ 16:40)      11-15-22 @ 07:01  -  11-16-22 @ 07:00  --------------------------------------------------------  IN: 2000 mL / OUT: 1000 mL / NET: 1000 mL    11-16-22 @ 07:01  -  11-16-22 @ 16:51  --------------------------------------------------------  IN: 700 mL / OUT: 1500 mL / NET: -800 mL      Physical Exam:  	    	Gen: Non toxic comfortable appearing   	Pulm: decrease bs  no rales or ronchi or wheezing  	CV: No JVD. RRR, S1S2; no rub  	Abd: +BS, soft, nontender/nondistended  	: no  suprapubic tenderness  	UE: Warm, no cyanosis  no clubbing,  no edema;  	LE: Warm, no cyanosis  no clubbing, no edema, R knee erythremia, swollen  	Neuro: alert and oriented. speech coherent       LABS/STUDIES  --------------------------------------------------------------------------------              8.1    9.45  >-----------<  356      [11-16-22 @ 04:19]              25.8     131  |  101  |  28  ----------------------------<  120      [11-16-22 @ 04:19]  4.9   |  19  |  2.01        Ca     9.1     [11-16-22 @ 04:19]      PT/INR: PT 11.4 , INR 0.98       [11-16-22 @ 04:19]  PTT: 39.7       [11-16-22 @ 04:19]      Creatinine Trend:  SCr 2.01 [11-16 @ 04:19]  SCr 1.96 [11-15 @ 18:42]  SCr 2.01 [11-15 @ 08:04]  SCr 2.19 [11-14 @ 17:54]              Urinalysis - [11-15-22 @ 20:41]      Color Light Yellow / Appearance Clear / SG 1.007 / pH 6.5      Gluc Negative / Ketone Negative  / Bili Negative / Urobili Negative       Blood Negative / Protein 30 mg/dL / Leuk Est Negative / Nitrite Negative      RBC 0 / WBC 1 / Hyaline  / Gran  / Sq Epi  / Non Sq Epi 1 / Bacteria Negative      Iron 52, TIBC 216, %sat 24      [11-16-22 @ 04:19]  Ferritin 429      [11-16-22 @ 04:19]

## 2022-11-16 NOTE — PROGRESS NOTE ADULT - SUBJECTIVE AND OBJECTIVE BOX
CC: F/U for PJI    Saw/spoke to patient. No fevers, no chills. No new complaints.    Allergies  penicillins (Short breath (Severe))    ANTIMICROBIALS:  ceFAZolin   IVPB    ceFAZolin   IVPB 1000 every 12 hours    OTHER MEDS:  acetaminophen     Tablet .. 650 milliGRAM(s) Oral every 6 hours PRN  amLODIPine   Tablet 10 milliGRAM(s) Oral daily  ascorbic acid 500 milliGRAM(s) Oral two times a day  atorvastatin 80 milliGRAM(s) Oral at bedtime  folic acid 1 milliGRAM(s) Oral daily  influenza  Vaccine (HIGH DOSE) 0.7 milliLiter(s) IntraMuscular once  metoprolol succinate ER 50 milliGRAM(s) Oral daily  multivitamin 1 Tablet(s) Oral daily  ondansetron Injectable 4 milliGRAM(s) IV Push every 6 hours PRN  pantoprazole    Tablet 40 milliGRAM(s) Oral before breakfast  raloxifene 60 milliGRAM(s) Oral daily  sodium chloride 0.9%. 1000 milliLiter(s) IV Continuous <Continuous>    PE:    Vital Signs Last 24 Hrs  T(C): 36.8 (2022 13:15), Max: 36.9 (15 Nov 2022 14:20)  T(F): 98.3 (2022 13:15), Max: 98.5 (15 Nov 2022 14:20)  HR: 52 (2022 09:02) (52 - 67)  BP: 146/67 (2022 09:02) (136/63 - 178/61)  RR: 18 (2022 09:02) (18 - 18)  SpO2: 97% (2022 13:15) (97% - 99%)    Gen: AOx3, NAD, non-toxic  CV: Nontachycardic  Resp: Breathing comfortably, RA  Abd: Soft, nontender  IV/Skin: No thrombophlebitis    LABS:                        8.1    9.45  )-----------( 356      ( 2022 04:19 )             25.8     11-16    131<L>  |  101  |  28<H>  ----------------------------<  120<H>  4.9   |  19<L>  |  2.01<H>    Ca    9.1      2022 04:19    Urinalysis Basic - ( 15 Nov 2022 20:41 )    Color: Light Yellow / Appearance: Clear / S.007 / pH: x  Gluc: x / Ketone: Negative  / Bili: Negative / Urobili: Negative   Blood: x / Protein: 30 mg/dL / Nitrite: Negative   Leuk Esterase: Negative / RBC: 0 /hpf / WBC 1 /HPF   Sq Epi: x / Non Sq Epi: 1 /hpf / Bacteria: Negative    MICROBIOLOGY:    .Blood Blood-Peripheral  22   No growth to date.  --  --    .Blood Blood-Peripheral  22   No growth to date.  --  --    RADIOLOGY:    11/15 CT:    IMPRESSION:    1.  Right knee arthroplasty.  2.  Lucency posterior to the patellar component concerning for loosening  3.  Small knee joint effusion, nonspecific finding.  4.  Mild skin irregularity at the anterior aspect of the knee,   nonspecific but can be seen with cellulitis or skin wound.

## 2022-11-16 NOTE — BRIEF OPERATIVE NOTE - NSICDXBRIEFPOSTOP_GEN_ALL_CORE_FT
POST-OP DIAGNOSIS:  Chronic infection of knee joint prosthesis 16-Nov-2022 22:27:48  Ladarius Richmond

## 2022-11-16 NOTE — PROGRESS NOTE ADULT - SUBJECTIVE AND OBJECTIVE BOX
Subjective: Patient seen and examined. No new events except as noted.     REVIEW OF SYSTEMS:    CONSTITUTIONAL: + weakness, fevers or chills  EYES/ENT: No visual changes;  No vertigo or throat pain   NECK: No pain or stiffness  RESPIRATORY: No cough, wheezing, hemoptysis; No shortness of breath  CARDIOVASCULAR: No chest pain or palpitations  GASTROINTESTINAL: No abdominal or epigastric pain. No nausea, vomiting, or hematemesis; No diarrhea or constipation. No melena or hematochezia.  GENITOURINARY: No dysuria, frequency or hematuria  NEUROLOGICAL: No numbness or weakness  SKIN: No itching, burning, rashes, or lesions   All other review of systems is negative unless indicated above.    MEDICATIONS:  MEDICATIONS  (STANDING):  amLODIPine   Tablet 10 milliGRAM(s) Oral daily  ascorbic acid 500 milliGRAM(s) Oral two times a day  atorvastatin 80 milliGRAM(s) Oral at bedtime  ceFAZolin   IVPB      ceFAZolin   IVPB 1000 milliGRAM(s) IV Intermittent every 12 hours  folic acid 1 milliGRAM(s) Oral daily  influenza  Vaccine (HIGH DOSE) 0.7 milliLiter(s) IntraMuscular once  metoprolol succinate ER 50 milliGRAM(s) Oral daily  multivitamin 1 Tablet(s) Oral daily  pantoprazole    Tablet 40 milliGRAM(s) Oral before breakfast  raloxifene 60 milliGRAM(s) Oral daily  sodium chloride 0.9%. 1000 milliLiter(s) (75 mL/Hr) IV Continuous <Continuous>    PHYSICAL EXAM:  T(C): 36.8 (11-16-22 @ 09:02), Max: 36.9 (11-15-22 @ 14:20)  HR: 52 (11-16-22 @ 09:02) (52 - 71)  BP: 146/67 (11-16-22 @ 09:02) (136/63 - 178/61)  RR: 18 (11-16-22 @ 09:02) (17 - 18)  SpO2: 97% (11-16-22 @ 09:02) (97% - 99%)  Wt(kg): --  I&O's Summary    15 Nov 2022 07:01  -  16 Nov 2022 07:00  --------------------------------------------------------  IN: 2000 mL / OUT: 1000 mL / NET: 1000 mL    Appearance: Normal	  HEENT: Normal oral mucosa, PERRL, EOMI	  Lymphatic: No lymphadenopathy , no edema  Cardiovascular: Normal S1 S2, No JVD, No murmurs , Peripheral pulses palpable 2+ bilaterally  Respiratory: Lungs clear to auscultation, normal effort 	  Gastrointestinal:  Soft, Non-tender, + BS	  Skin: No rashes, No ecchymoses, No cyanosis, warm to touch - right knee healed incision w/ swelling  Musculoskeletal: Normal range of motion, normal strength  Psychiatry:  Mood & affect appropriate  Ext: No edema      LABS:    CARDIAC MARKERS:                        8.1    9.45  )-----------( 356      ( 16 Nov 2022 04:19 )             25.8     11-16    131<L>  |  101  |  28<H>  ----------------------------<  120<H>  4.9   |  19<L>  |  2.01<H>    Ca    9.1      16 Nov 2022 04:19    proBNP:   Lipid Profile:   HgA1c:   TSH:     TELEMETRY: 	    ECG:  	  RADIOLOGY:   DIAGNOSTIC TESTING:  [ ] Echocardiogram:  [ ]  Catheterization:  [ ] Stress Test:    OTHER:

## 2022-11-16 NOTE — PRE-ANESTHESIA EVALUATION ADULT - NSANTHPMHFT_GEN_ALL_CORE
MSSA of R knee prosthetic joint, now for revision of the right TKA  Anemia - hemoglobin 8.1; it was explained to patient and surgeon that she will likely require a blood transfusion intraoperatively

## 2022-11-16 NOTE — PROVIDER CONTACT NOTE (CHANGE IN STATUS NOTIFICATION) - ASSESSMENT
Pt reports blurry vision on neuro assessment and unable to see number of fingers placed in front of her. Pt tracking appropriately. No drift. Following commands. Face symmetrical.

## 2022-11-16 NOTE — PROGRESS NOTE ADULT - SUBJECTIVE AND OBJECTIVE BOX
Orthopaedic Surgery Progress Note    Subjective:   Patient seen and examined. No acute events overnight. States pain is controlled. Denies fever/chills/chest pain/shortness of breath/numbness/tingling.    Objective:  T(C): 36.8 (22 @ 09:02), Max: 36.9 (11-15-22 @ 14:20)  HR: 52 (22 @ 09:02) (52 - 71)  BP: 146/67 (22 @ 09:02) (136/63 - 178/61)  RR: 18 (22 @ 09:02) (17 - 18)  SpO2: 97% (22 @ 09:02) (97% - 99%)  Wt(kg): --    11-15 @ 07:01  -   @ 07:00  --------------------------------------------------------  IN: 2000 mL / OUT: 1000 mL / NET: 1000 mL        Physical Exam:  Gen: NAD, resting comfortably  Incision well healed but with overlying collection center of incision with drainage present   +Q/H/EHL/FHL/TA/GS  SILT L2-S1  +DP/PT 2+  Calf NTTP b/l  Compartments soft and compressible                          8.1    9.45  )-----------( 356      ( 2022 04:19 )             25.8     11-16    131<L>  |  101  |  28<H>  ----------------------------<  120<H>  4.9   |  19<L>  |  2.01<H>    Ca    9.1      2022 04:19      PT/INR - ( 2022 04:19 )   PT: 11.4 sec;   INR: 0.98 ratio         PTT - ( 2022 04:19 )  PTT:39.7 sec  Urinalysis Basic - ( 15 Nov 2022 20:41 )    Color: Light Yellow / Appearance: Clear / S.007 / pH: x  Gluc: x / Ketone: Negative  / Bili: Negative / Urobili: Negative   Blood: x / Protein: 30 mg/dL / Nitrite: Negative   Leuk Esterase: Negative / RBC: 0 /hpf / WBC 1 /HPF   Sq Epi: x / Non Sq Epi: 1 /hpf / Bacteria: Negative

## 2022-11-16 NOTE — PROVIDER CONTACT NOTE (CHANGE IN STATUS NOTIFICATION) - SITUATION
Pt is 77 year old female postop right knee revision I&D with antibiotic spacer. Pt arrived in PACU [id number]

## 2022-11-16 NOTE — BRIEF OPERATIVE NOTE - NSICDXBRIEFPREOP_GEN_ALL_CORE_FT
PRE-OP DIAGNOSIS:  Chronic infection of knee joint prosthesis 16-Nov-2022 22:27:42  Ladarius Richmond

## 2022-11-17 LAB
ANION GAP SERPL CALC-SCNC: 12 MMOL/L — SIGNIFICANT CHANGE UP (ref 5–17)
BUN SERPL-MCNC: 24 MG/DL — HIGH (ref 7–23)
CALCIUM SERPL-MCNC: 7.8 MG/DL — LOW (ref 8.4–10.5)
CHLORIDE SERPL-SCNC: 103 MMOL/L — SIGNIFICANT CHANGE UP (ref 96–108)
CO2 SERPL-SCNC: 17 MMOL/L — LOW (ref 22–31)
CREAT SERPL-MCNC: 1.36 MG/DL — HIGH (ref 0.5–1.3)
EGFR: 40 ML/MIN/1.73M2 — LOW
GLUCOSE SERPL-MCNC: 196 MG/DL — HIGH (ref 70–99)
GRAM STN FLD: SIGNIFICANT CHANGE UP
HCT VFR BLD CALC: 34 % — LOW (ref 34.5–45)
HGB BLD-MCNC: 11.1 G/DL — LOW (ref 11.5–15.5)
MCHC RBC-ENTMCNC: 28.2 PG — SIGNIFICANT CHANGE UP (ref 27–34)
MCHC RBC-ENTMCNC: 32.6 GM/DL — SIGNIFICANT CHANGE UP (ref 32–36)
MCV RBC AUTO: 86.5 FL — SIGNIFICANT CHANGE UP (ref 80–100)
NIGHT BLUE STAIN TISS: SIGNIFICANT CHANGE UP
NRBC # BLD: 0 /100 WBCS — SIGNIFICANT CHANGE UP (ref 0–0)
PLATELET # BLD AUTO: 256 K/UL — SIGNIFICANT CHANGE UP (ref 150–400)
POTASSIUM SERPL-MCNC: 5.2 MMOL/L — SIGNIFICANT CHANGE UP (ref 3.5–5.3)
POTASSIUM SERPL-SCNC: 5.2 MMOL/L — SIGNIFICANT CHANGE UP (ref 3.5–5.3)
RBC # BLD: 3.93 M/UL — SIGNIFICANT CHANGE UP (ref 3.8–5.2)
RBC # FLD: 16.6 % — HIGH (ref 10.3–14.5)
SODIUM SERPL-SCNC: 132 MMOL/L — LOW (ref 135–145)
SPECIMEN SOURCE: SIGNIFICANT CHANGE UP
WBC # BLD: 18.34 K/UL — HIGH (ref 3.8–10.5)
WBC # FLD AUTO: 18.34 K/UL — HIGH (ref 3.8–10.5)

## 2022-11-17 PROCEDURE — 99232 SBSQ HOSP IP/OBS MODERATE 35: CPT

## 2022-11-17 RX ORDER — INSULIN LISPRO 100/ML
VIAL (ML) SUBCUTANEOUS AT BEDTIME
Refills: 0 | Status: DISCONTINUED | OUTPATIENT
Start: 2022-11-17 | End: 2022-11-22

## 2022-11-17 RX ORDER — LANOLIN ALCOHOL/MO/W.PET/CERES
3 CREAM (GRAM) TOPICAL AT BEDTIME
Refills: 0 | Status: DISCONTINUED | OUTPATIENT
Start: 2022-11-17 | End: 2022-11-22

## 2022-11-17 RX ORDER — INSULIN LISPRO 100/ML
VIAL (ML) SUBCUTANEOUS
Refills: 0 | Status: DISCONTINUED | OUTPATIENT
Start: 2022-11-17 | End: 2022-11-22

## 2022-11-17 RX ORDER — METOPROLOL TARTRATE 50 MG
100 TABLET ORAL DAILY
Refills: 0 | Status: DISCONTINUED | OUTPATIENT
Start: 2022-11-17 | End: 2022-11-22

## 2022-11-17 RX ADMIN — SODIUM CHLORIDE 150 MILLILITER(S): 9 INJECTION INTRAMUSCULAR; INTRAVENOUS; SUBCUTANEOUS at 09:38

## 2022-11-17 RX ADMIN — Medication 1: at 09:39

## 2022-11-17 RX ADMIN — Medication 400 MILLIGRAM(S): at 17:02

## 2022-11-17 RX ADMIN — Medication 100 MILLIGRAM(S): at 20:40

## 2022-11-17 RX ADMIN — OXYCODONE HYDROCHLORIDE 5 MILLIGRAM(S): 5 TABLET ORAL at 04:09

## 2022-11-17 RX ADMIN — Medication 1 TABLET(S): at 11:41

## 2022-11-17 RX ADMIN — OXYCODONE HYDROCHLORIDE 10 MILLIGRAM(S): 5 TABLET ORAL at 20:39

## 2022-11-17 RX ADMIN — OXYCODONE HYDROCHLORIDE 5 MILLIGRAM(S): 5 TABLET ORAL at 05:09

## 2022-11-17 RX ADMIN — SODIUM CHLORIDE 500 MILLILITER(S): 9 INJECTION INTRAMUSCULAR; INTRAVENOUS; SUBCUTANEOUS at 03:11

## 2022-11-17 RX ADMIN — Medication 100 MILLIGRAM(S): at 13:26

## 2022-11-17 RX ADMIN — OXYCODONE HYDROCHLORIDE 10 MILLIGRAM(S): 5 TABLET ORAL at 01:53

## 2022-11-17 RX ADMIN — ATORVASTATIN CALCIUM 80 MILLIGRAM(S): 80 TABLET, FILM COATED ORAL at 22:33

## 2022-11-17 RX ADMIN — Medication 100 MILLIGRAM(S): at 05:55

## 2022-11-17 RX ADMIN — APIXABAN 2.5 MILLIGRAM(S): 2.5 TABLET, FILM COATED ORAL at 05:56

## 2022-11-17 RX ADMIN — APIXABAN 2.5 MILLIGRAM(S): 2.5 TABLET, FILM COATED ORAL at 17:00

## 2022-11-17 RX ADMIN — SODIUM CHLORIDE 500 MILLILITER(S): 9 INJECTION INTRAMUSCULAR; INTRAVENOUS; SUBCUTANEOUS at 05:56

## 2022-11-17 RX ADMIN — Medication 100 MILLIGRAM(S): at 22:33

## 2022-11-17 RX ADMIN — Medication 1000 MILLIGRAM(S): at 17:32

## 2022-11-17 RX ADMIN — OXYCODONE HYDROCHLORIDE 5 MILLIGRAM(S): 5 TABLET ORAL at 09:38

## 2022-11-17 RX ADMIN — PANTOPRAZOLE SODIUM 40 MILLIGRAM(S): 20 TABLET, DELAYED RELEASE ORAL at 05:56

## 2022-11-17 RX ADMIN — OXYCODONE HYDROCHLORIDE 5 MILLIGRAM(S): 5 TABLET ORAL at 10:08

## 2022-11-17 RX ADMIN — OXYCODONE HYDROCHLORIDE 10 MILLIGRAM(S): 5 TABLET ORAL at 14:53

## 2022-11-17 RX ADMIN — OXYCODONE HYDROCHLORIDE 5 MILLIGRAM(S): 5 TABLET ORAL at 02:25

## 2022-11-17 RX ADMIN — OXYCODONE HYDROCHLORIDE 10 MILLIGRAM(S): 5 TABLET ORAL at 21:40

## 2022-11-17 RX ADMIN — LOSARTAN POTASSIUM 100 MILLIGRAM(S): 100 TABLET, FILM COATED ORAL at 05:56

## 2022-11-17 RX ADMIN — RALOXIFENE HYDROCHLORIDE 60 MILLIGRAM(S): 60 TABLET, COATED ORAL at 11:41

## 2022-11-17 RX ADMIN — SENNA PLUS 2 TABLET(S): 8.6 TABLET ORAL at 22:33

## 2022-11-17 RX ADMIN — Medication 1: at 14:41

## 2022-11-17 RX ADMIN — OXYCODONE HYDROCHLORIDE 5 MILLIGRAM(S): 5 TABLET ORAL at 01:25

## 2022-11-17 NOTE — OCCUPATIONAL THERAPY INITIAL EVALUATION ADULT - PERTINENT HX OF CURRENT PROBLEM, REHAB EVAL
77F hx R TKA by Dr. Herring on 4/8/22 at [facility] who presents for evaluation of right knee swelling and severe pain, which has been worsening. Patient has been unable to bear weight due to pain. She was instructed to present to the ED for evaluation with Dr. 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 Her initial postoperative period was complicated by wound break down that was treated with Bactrim after cultures were positive for MSSA. Conservative treatment including NSAIDs, activity modification, and assistive devices have been minimally helpful. Otherwise denies numbness/tingling, weakness, falls/trauma, any other acute orthopedic injury or complaints.   PROCEDURES:  Revision of femoral and entire tibial component of total knee arthroplasty 16-Nov-2022 22:27:21  Ladarius Richmond.

## 2022-11-17 NOTE — PROGRESS NOTE ADULT - PROBLEM SELECTOR PLAN 2
d/c losartan given suspected YOUNG on CKD . Cr much improved   add tenormin 50 mg daily for now   c/w amlodipine as ordered   continue to monitor d/c losartan given suspected YOUNG on CKD . Cr much improved   Increase toprol 100   c/w amlodipine as ordered   continue to monitor

## 2022-11-17 NOTE — PHYSICAL THERAPY INITIAL EVALUATION ADULT - GENERAL OBSERVATIONS, REHAB EVAL
Rec'd Rec'd semi supine in bed, NAD, VSS, A/Ox4, +JEFFERSON drain, +IVL, agreeable to PT. Rec'd semi supine in bed, NAD, VSS, A/Ox4, +huy, +JEFFERSON drain, +IVL, +RLE TTWB 20% in knee immobilizer, agreeable to PT.

## 2022-11-17 NOTE — PROGRESS NOTE ADULT - SUBJECTIVE AND OBJECTIVE BOX
[facility]   [phone]  RENAL FOLLOW UP NOTE  --------------------------------------------------------------------------------  Chief Complaint:    24 hour events/subjective:    seen earlier   c/o pain right knee  s/p explant knee joint     PAST HISTORY  --------------------------------------------------------------------------------  No significant changes to PMH, PSH, FHx, SHx, unless otherwise noted    ALLERGIES & MEDICATIONS  --------------------------------------------------------------------------------  Allergies    penicillins (Short breath (Severe))    Intolerances      Standing Inpatient Medications  acetaminophen   IVPB .. 1000 milliGRAM(s) IV Intermittent once  apixaban 2.5 milliGRAM(s) Oral two times a day  atorvastatin 80 milliGRAM(s) Oral at bedtime  ceFAZolin   IVPB 2000 milliGRAM(s) IV Intermittent every 8 hours  HYDROmorphone  Injectable 0.5 milliGRAM(s) IV Push once  influenza  Vaccine (HIGH DOSE) 0.7 milliLiter(s) IntraMuscular once  insulin lispro (ADMELOG) corrective regimen sliding scale   SubCutaneous three times a day before meals  insulin lispro (ADMELOG) corrective regimen sliding scale   SubCutaneous at bedtime  metoprolol succinate  milliGRAM(s) Oral daily  multivitamin 1 Tablet(s) Oral daily  pantoprazole    Tablet 40 milliGRAM(s) Oral before breakfast  polyethylene glycol 3350 17 Gram(s) Oral at bedtime  raloxifene 60 milliGRAM(s) Oral daily  senna 2 Tablet(s) Oral at bedtime  sodium chloride 0.9% Bolus 500 milliLiter(s) IV Bolus once  sodium chloride 0.9%. 1000 milliLiter(s) IV Continuous <Continuous>    PRN Inpatient Medications  magnesium hydroxide Suspension 30 milliLiter(s) Oral daily PRN  melatonin 3 milliGRAM(s) Oral at bedtime PRN  ondansetron Injectable 4 milliGRAM(s) IV Push every 6 hours PRN  oxyCODONE    IR 5 milliGRAM(s) Oral every 4 hours PRN  oxyCODONE    IR 10 milliGRAM(s) Oral every 4 hours PRN  traMADol 50 milliGRAM(s) Oral every 6 hours PRN      REVIEW OF SYSTEMS  --------------------------------------------------------------------------------    Gen: denies  fevers/chills,  CVS: denies chest pain/palpitations  Resp: denies SOB/Cough  GI: Denies N/V/Abd pain  : Denies dysuria    VITALS/PHYSICAL EXAM  --------------------------------------------------------------------------------  T(C): 36.4 (11-17-22 @ 17:24), Max: 36.6 (11-17-22 @ 08:45)  HR: 110 (11-17-22 @ 20:40) (98 - 110)  BP: 133/63 (11-17-22 @ 20:40) (112/62 - 140/71)  RR: 18 (11-17-22 @ 20:40) (14 - 18)  SpO2: 100% (11-17-22 @ 20:40) (97% - 100%)  Wt(kg): --  Height (cm): 154.9 (11-16-22 @ 16:40)  Weight (kg): 59 (11-16-22 @ 16:40)  BMI (kg/m2): 24.6 (11-16-22 @ 16:40)  BSA (m2): 1.57 (11-16-22 @ 16:40)      11-16-22 @ 07:01  -  11-17-22 @ 07:00  --------------------------------------------------------  IN: 2000 mL / OUT: 2120 mL / NET: -120 mL    11-17-22 @ 07:01  -  11-17-22 @ 22:21  --------------------------------------------------------  IN: 650 mL / OUT: 1100 mL / NET: -450 mL      Physical Exam:  	    	Gen: Non toxic comfortable appearing   	Pulm: decrease bs  no rales or ronchi or wheezing  	CV: No JVD. RRR, S1S2; no rub  	Abd: +BS, soft, nontender/nondistended  	: no  suprapubic tenderness  	UE: Warm, no cyanosis  no clubbing,  no edema;  	LE: Warm,no edema, R knee dressing and brace  	Neuro: alert and oriented. speech coherent       LABS/STUDIES  --------------------------------------------------------------------------------              11.1   18.34 >-----------<  256      [11-17-22 @ 07:32]              34.0     132  |  103  |  24  ----------------------------<  196      [11-17-22 @ 07:29]  5.2   |  17  |  1.36        Ca     7.8     [11-17-22 @ 07:29]      PT/INR: PT 11.4 , INR 0.98       [11-16-22 @ 04:19]  PTT: 39.7       [11-16-22 @ 04:19]      Creatinine Trend:  SCr 1.36 [11-17 @ 07:29]  SCr 1.50 [11-16 @ 23:08]  SCr 2.01 [11-16 @ 04:19]  SCr 1.96 [11-15 @ 18:42]  SCr 2.01 [11-15 @ 08:04]              Urinalysis - [11-15-22 @ 20:41]      Color Light Yellow / Appearance Clear / SG 1.007 / pH 6.5      Gluc Negative / Ketone Negative  / Bili Negative / Urobili Negative       Blood Negative / Protein 30 mg/dL / Leuk Est Negative / Nitrite Negative      RBC 0 / WBC 1 / Hyaline  / Gran  / Sq Epi  / Non Sq Epi 1 / Bacteria Negative      Iron 52, TIBC 216, %sat 24      [11-16-22 @ 04:19]  Ferritin 429      [11-16-22 @ 04:19]

## 2022-11-17 NOTE — PROGRESS NOTE ADULT - SUBJECTIVE AND OBJECTIVE BOX
Patient seen and examined at bedside. Pain well controlled with medication. Patient denies any numbness, tingling, weakness, or any other orthopaedic complaint. Denies N/V/CP/SOB.       Exam:   Gen: NAD, resting in bed  Resp: unlabored breathing  LLE: BJKI; dressing c/d/i; JEFFERSON drain draining serosang fluid        +EHL/FHL/TA/GS         SILT [place]ia/Saph/Tib/DP/SP        2+ DP, cap refill <2 sec         A/P: 77yFemale POD1 s/p  L TKA stage 1 revision w/ abx spacer, recovering well.    - Pain control  - 20% WB LLE, in KNEE IMMOBILIZER  - ABSOLUTELY NO KNEE RANGE OF MOTION  - DVT ppx: Tamika  - PT/OT  - OOB/AAT  - Diet: carb consistent  - Monitor I&Os  - Dispo: floor

## 2022-11-17 NOTE — PHYSICAL THERAPY INITIAL EVALUATION ADULT - PERTINENT HX OF CURRENT PROBLEM, REHAB EVAL
77F hx R TKA by Dr. Herring on 4/8/22 at [facility] who presents for evaluation of right knee swelling and severe pain, which has been worsening. Patient has been unable to bear weight due to pain. She was instructed to present to the ED for evaluation with Dr. 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 Her initial postoperative period was complicated by wound break down that was treated with Bactrim after cultures were positive for MSSA. Conservative treatment including NSAIDs, activity modification, and assistive devices have been minimally helpful. Otherwise denies numbness/tingling, weakness, falls/trauma, any other acute orthopedic injury or complaints. Pt now s/p s/p L TKA stage 1 revision w/ abx spacer. 77F hx R TKA by Dr. Herring on 4/8/22 at [facility] who presents for evaluation of right knee swelling and severe pain, which has been worsening. Patient has been unable to bear weight due to pain. She was instructed to present to the ED for evaluation with Dr. 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 Her initial postoperative period was complicated by wound break down that was treated with Bactrim after cultures were positive for MSSA. Conservative treatment including NSAIDs, activity modification, and assistive devices have been minimally helpful. Otherwise denies numbness/tingling, weakness, falls/trauma, any other acute orthopedic injury or complaints. Pt now s/p s/p L TKA stage 1 revision w/ abx spacer.  11/15 CT R Knee: Right knee arthroplasty. Lucency posterior to the patellar component concerning for loosening. Small knee joint effusion, nonspecific finding. Mild skin irregularity at the anterior aspect of the knee, nonspecific but can be seen with cellulitis or skin wound.  11/15 US Kidney/Bladder: (-) 77F hx R TKA by Dr. Herring on 4/8/22 at [facility] who presents for evaluation of right knee swelling and severe pain, which has been worsening. Patient has been unable to bear weight due to pain. She was instructed to present to the ED for evaluation with Dr. 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 Her initial postoperative period was complicated by wound break down that was treated with Bactrim after cultures were positive for MSSA. Conservative treatment including NSAIDs, activity modification, and assistive devices have been minimally helpful. Otherwise denies numbness/tingling, weakness, falls/trauma, any other acute orthopedic injury or complaints. Pt now s/p s/p L TKA stage 1 revision w/ abx spacer.  11/14: CXR: (-) ; US LE Vein: No evidence of deep venous thrombosis in either lower extremity.   11/15: CT R Knee: Right knee arthroplasty. Lucency posterior to the patellar component concerning for loosening. Small knee joint effusion, nonspecific finding. Mild skin irregularity at the anterior aspect of the knee, nonspecific but can be seen with cellulitis or skin wound;   US Kidney/Bladder: (-); TTE: Moderately dilated left atrium. LA volume index = 44 cc/m2. Increased relative wall thickness with normal left ventricular mass index, consistent with concentric left ventricular remodeling. Normal left ventricular systolic function. No segmental wall motion abnormalities. Normal right ventricular size and function.

## 2022-11-17 NOTE — OCCUPATIONAL THERAPY INITIAL EVALUATION ADULT - ADDITIONAL COMMENTS
Pt lives in  with  0 RANDY and flight to bedrooms, has tub shower +shower chair, cane and RW. PTA was independent with all ADLs

## 2022-11-17 NOTE — PROGRESS NOTE ADULT - SUBJECTIVE AND OBJECTIVE BOX
ORTHOPEDIC SURGERY POST-OP CHECK    S: Patient seen and examined at bedside POD0 s/p L knee stage 1 revision total knee w/ placement of abx spacer. Complaining of pain at surgical site.  Denies numbness/tingling in the extremity. Denies shortness of breath and chest pain. Reports blurriness in vision immediately post-op that is improving.    O: T(C): 36.7 (11-16-22 @ 22:15), Max: 36.9 (11-16-22 @ 13:00)  HR: 107 (11-17-22 @ 00:30) (52 - 109)  BP: 122/64 (11-17-22 @ 00:30) (119/60 - 181/74)  RR: 16 (11-17-22 @ 00:30) (14 - 18)  SpO2: 99% (11-17-22 @ 00:30) (97% - 100%)    Exam:   Gen: NAD, resting in bed  Resp: unlabored breathing  LLE: BJKI; dressing c/d/i; JEFFERSON drain draining serosang fluid        +EHL/FHL/TA/GS         SILT Heredia/Saph/Tib/DP/SP        2+ DP, cap refill <2 sec         11-15-22 @ 07:01  -  11-16-22 @ 07:00  --------------------------------------------------------  IN: 2000 mL / OUT: 1000 mL / NET: 1000 mL    11-16-22 @ 07:01  -  11-17-22 @ 00:55  --------------------------------------------------------  IN: 700 mL / OUT: 1615 mL / NET: -915 mL          A/P: 77yFemale POD0 s/p  L TKA stage 1 revision w/ abx spacer, recovering well.    - Pain control  - 20% WB LLE  - DVT ppx: Tamika  - PT/OT  - OOB/AAT  - Diet: carb consistent  - Monitor I&Os  - Dispo: floor    Blurry vision re-evaluated around 1AM and patient reports it has resolved. Likely 2/2 anesthesia gel during intubation. If blurriness persists, please contact.    For all questions related to patient care, please reach out via the on-call pager.    Jaycee Beard, PGY-1  Orthopedic Surgery  [facility]: [phone]  [facility]: [phone]  [facility] [place]: [phone]

## 2022-11-17 NOTE — PHYSICAL THERAPY INITIAL EVALUATION ADULT - PLANNED THERAPY INTERVENTIONS, PT EVAL
GOAL: Stair Negotiation Training: Patient will be able to negotiate up & down 1 flight of stairs with supervision with unilateral rail, step to gait pattern, in 2 weeks./balance training/bed mobility training/gait training/strengthening/transfer training

## 2022-11-17 NOTE — PROGRESS NOTE ADULT - SUBJECTIVE AND OBJECTIVE BOX
Subjective: Patient seen and examined. No new events except as noted.   s/p Revision of femoral and entire tibial component of total knee arthroplasty POD 1     REVIEW OF SYSTEMS:    CONSTITUTIONAL: +weakness, fevers or chills  EYES/ENT: No visual changes;  No vertigo or throat pain   NECK: No pain or stiffness  RESPIRATORY: No cough, wheezing, hemoptysis; No shortness of breath  CARDIOVASCULAR: No chest pain or palpitations  GASTROINTESTINAL: No abdominal or epigastric pain. No nausea, vomiting, or hematemesis; No diarrhea or constipation. No melena or hematochezia.  GENITOURINARY: No dysuria, frequency or hematuria  NEUROLOGICAL: No numbness or weakness  SKIN: No itching, burning, rashes, or lesions   All other review of systems is negative unless indicated above.    MEDICATIONS:  MEDICATIONS  (STANDING):  acetaminophen   IVPB .. 1000 milliGRAM(s) IV Intermittent once  acetaminophen   IVPB .. 1000 milliGRAM(s) IV Intermittent once  apixaban 2.5 milliGRAM(s) Oral two times a day  atorvastatin 80 milliGRAM(s) Oral at bedtime  ceFAZolin   IVPB 2000 milliGRAM(s) IV Intermittent every 8 hours  HYDROmorphone  Injectable 0.5 milliGRAM(s) IV Push once  influenza  Vaccine (HIGH DOSE) 0.7 milliLiter(s) IntraMuscular once  insulin lispro (ADMELOG) corrective regimen sliding scale   SubCutaneous three times a day before meals  insulin lispro (ADMELOG) corrective regimen sliding scale   SubCutaneous at bedtime  losartan 100 milliGRAM(s) Oral daily  metoprolol succinate ER 50 milliGRAM(s) Oral daily  multivitamin 1 Tablet(s) Oral daily  pantoprazole    Tablet 40 milliGRAM(s) Oral before breakfast  polyethylene glycol 3350 17 Gram(s) Oral at bedtime  raloxifene 60 milliGRAM(s) Oral daily  senna 2 Tablet(s) Oral at bedtime  sodium chloride 0.9% Bolus 500 milliLiter(s) IV Bolus once  sodium chloride 0.9%. 1000 milliLiter(s) (150 mL/Hr) IV Continuous <Continuous>      PHYSICAL EXAM:  T(C): 36.6 (11-17-22 @ 08:45), Max: 36.9 (11-16-22 @ 13:00)  HR: 104 (11-17-22 @ 09:30) (55 - 109)  BP: 133/61 (11-17-22 @ 09:30) (112/62 - 181/74)  RR: 18 (11-17-22 @ 08:45) (14 - 18)  SpO2: 99% (11-17-22 @ 09:30) (97% - 100%)  Wt(kg): --  I&O's Summary    16 Nov 2022 07:01  -  17 Nov 2022 07:00  --------------------------------------------------------  IN: 2000 mL / OUT: 2120 mL / NET: -120 mL    17 Nov 2022 07:01  -  17 Nov 2022 10:43  --------------------------------------------------------  IN: 120 mL / OUT: 130 mL / NET: -10 mL      Height (cm): 154.9 (11-16 @ 16:40)  Weight (kg): 59 (11-16 @ 16:40)  BMI (kg/m2): 24.6 (11-16 @ 16:40)  BSA (m2): 1.57 (11-16 @ 16:40)    Appearance: Normal	  HEENT:   Normal oral mucosa, PERRL, EOMI	  Lymphatic: No lymphadenopathy , no edema  Cardiovascular: Normal S1 S2, No JVD, No murmurs , Peripheral pulses palpable 2+ bilaterally  Respiratory: Lungs clear to auscultation, normal effort 	  Gastrointestinal:  Soft, Non-tender, + BS	  Skin: No rashes, No ecchymoses, No cyanosis, warm to touch  Musculoskeletal: Normal range of motion, normal strength  Psychiatry:  Mood & affect appropriate  LLE: BJKI; dressing c/d/i; JEFFERSON drain draining serosang fluid        +EHL/FHL/TA/GS         SILT Heredia/Saph/Tib/DP/SP        2+ DP, cap refill <2 sec      LABS:    CARDIAC MARKERS:                                11.1   18.34 )-----------( 256      ( 17 Nov 2022 07:32 )             34.0     11-17    132<L>  |  103  |  24<H>  ----------------------------<  196<H>  5.2   |  17<L>  |  1.36<H>    Ca    7.8<L>      17 Nov 2022 07:29            TELEMETRY: 	    ECG:  	  RADIOLOGY:   DIAGNOSTIC TESTING:  [ ] Echocardiogram:  [ ]  Catheterization:  [ ] Stress Test:    OTHER:

## 2022-11-17 NOTE — PROGRESS NOTE ADULT - SUBJECTIVE AND OBJECTIVE BOX
CC: F/U for [facility]    Saw/spoke to patient. S/P OR. generally well. No new complaints.    Allergies  penicillins (Short breath (Severe))    ANTIMICROBIALS:  ceFAZolin   IVPB 2000 every 8 hours    PE:    Vital Signs Last 24 Hrs  T(C): 36.5 (2022 12:53), Max: 36.8 (2022 16:40)  T(F): 97.7 (2022 12:53), Max: 98.1 (2022 22:15)  HR: 98 (2022 12:53) (89 - 109)  BP: 114/63 (2022 12:53) (112/62 - 181/74)  BP(mean): 86 (2022 01:25) (83 - 98)  RR: 18 (2022 12:53) (14 - 18)  SpO2: 99% (2022 12:53) (98% - 100%)    Gen: AOx3, NAD, non-toxic  CV: Nontachycardic  Resp: Breathing comfortably, RA  Abd: Soft, nontender  IV/Skin: No thrombophlebitis    LABS:                        11.1   18.34 )-----------( 256      ( 2022 07:32 )             34.0         132<L>  |  103  |  24<H>  ----------------------------<  196<H>  5.2   |  17<L>  |  1.36<H>    Ca    7.8<L>      2022 07:29    Urinalysis Basic - ( 15 Nov 2022 20:41 )    Color: Light Yellow / Appearance: Clear / S.007 / pH: x  Gluc: x / Ketone: Negative  / Bili: Negative / Urobili: Negative   Blood: x / Protein: 30 mg/dL / Nitrite: Negative   Leuk Esterase: Negative / RBC: 0 /hpf / WBC 1 /HPF   Sq Epi: x / Non Sq Epi: 1 /hpf / Bacteria: Negative      MICROBIOLOGY:    .Tissue Other  22   Testing in progress  --    No polymorphonuclear leukocytes seen per low power field  Rare Gram positive cocci in pairs seen per oil power field    .Blood Blood-Peripheral  22   No growth to date.  --  --    .Blood Blood-Peripheral  22   No growth to date.  --  --    RADIOLOGY:    11/15 CT:    IMPRESSION:    1.  Right knee arthroplasty.  2.  Lucency posterior to the patellar component concerning for loosening  3.  Small knee joint effusion, nonspecific finding.  4.  Mild skin irregularity at the anterior aspect of the knee,   nonspecific but can be seen with cellulitis or skin wound.

## 2022-11-17 NOTE — PHYSICAL THERAPY INITIAL EVALUATION ADULT - ACTIVE RANGE OF MOTION EXAMINATION, REHAB EVAL
except R knee not tested 2/2 immobilizer/bilateral upper extremity Active ROM was WFL (within functional limits)/bilateral  lower extremity Active ROM was WFL (within functional limits) except R knee ROM not allowed/bilateral upper extremity Active ROM was WFL (within functional limits)/bilateral  lower extremity Active ROM was WFL (within functional limits)

## 2022-11-17 NOTE — PHYSICAL THERAPY INITIAL EVALUATION ADULT - ADDITIONAL COMMENTS
Prior to hospitalization, pt independent with ADLs, reports using a cane and walker occassionally for ambulation. Prior to hospitalization, pt lived with her  in a private home with +5STE and 12 steps inside to bedroom (+HR). Pt was independent with ADLs, reports using a cane and walker occasionally for ambulation.

## 2022-11-18 LAB
-  AMPICILLIN/SULBACTAM: SIGNIFICANT CHANGE UP
-  CEFAZOLIN: SIGNIFICANT CHANGE UP
-  CLINDAMYCIN: SIGNIFICANT CHANGE UP
-  ERYTHROMYCIN: SIGNIFICANT CHANGE UP
-  GENTAMICIN: SIGNIFICANT CHANGE UP
-  OXACILLIN: SIGNIFICANT CHANGE UP
-  RIFAMPIN: SIGNIFICANT CHANGE UP
-  TETRACYCLINE: SIGNIFICANT CHANGE UP
-  TRIMETHOPRIM/SULFAMETHOXAZOLE: SIGNIFICANT CHANGE UP
-  VANCOMYCIN: SIGNIFICANT CHANGE UP
ANION GAP SERPL CALC-SCNC: 7 MMOL/L — SIGNIFICANT CHANGE UP (ref 5–17)
ANION GAP SERPL CALC-SCNC: 9 MMOL/L — SIGNIFICANT CHANGE UP (ref 5–17)
BUN SERPL-MCNC: 26 MG/DL — HIGH (ref 7–23)
BUN SERPL-MCNC: 26 MG/DL — HIGH (ref 7–23)
CALCIUM SERPL-MCNC: 7.8 MG/DL — LOW (ref 8.4–10.5)
CALCIUM SERPL-MCNC: 7.9 MG/DL — LOW (ref 8.4–10.5)
CHLORIDE SERPL-SCNC: 101 MMOL/L — SIGNIFICANT CHANGE UP (ref 96–108)
CHLORIDE SERPL-SCNC: 99 MMOL/L — SIGNIFICANT CHANGE UP (ref 96–108)
CK SERPL-CCNC: 217 U/L — HIGH (ref 25–170)
CO2 SERPL-SCNC: 20 MMOL/L — LOW (ref 22–31)
CO2 SERPL-SCNC: 21 MMOL/L — LOW (ref 22–31)
CREAT SERPL-MCNC: 1.89 MG/DL — HIGH (ref 0.5–1.3)
CREAT SERPL-MCNC: 1.95 MG/DL — HIGH (ref 0.5–1.3)
EGFR: 26 ML/MIN/1.73M2 — LOW
EGFR: 27 ML/MIN/1.73M2 — LOW
GLUCOSE SERPL-MCNC: 136 MG/DL — HIGH (ref 70–99)
GLUCOSE SERPL-MCNC: 151 MG/DL — HIGH (ref 70–99)
HCT VFR BLD CALC: 27.8 % — LOW (ref 34.5–45)
HGB BLD-MCNC: 8.7 G/DL — LOW (ref 11.5–15.5)
MCHC RBC-ENTMCNC: 28.2 PG — SIGNIFICANT CHANGE UP (ref 27–34)
MCHC RBC-ENTMCNC: 31.3 GM/DL — LOW (ref 32–36)
MCV RBC AUTO: 90.3 FL — SIGNIFICANT CHANGE UP (ref 80–100)
METHOD TYPE: SIGNIFICANT CHANGE UP
NRBC # BLD: 0 /100 WBCS — SIGNIFICANT CHANGE UP (ref 0–0)
PLATELET # BLD AUTO: 234 K/UL — SIGNIFICANT CHANGE UP (ref 150–400)
POTASSIUM SERPL-MCNC: 5.1 MMOL/L — SIGNIFICANT CHANGE UP (ref 3.5–5.3)
POTASSIUM SERPL-MCNC: 5.6 MMOL/L — HIGH (ref 3.5–5.3)
POTASSIUM SERPL-SCNC: 5.1 MMOL/L — SIGNIFICANT CHANGE UP (ref 3.5–5.3)
POTASSIUM SERPL-SCNC: 5.6 MMOL/L — HIGH (ref 3.5–5.3)
RBC # BLD: 3.08 M/UL — LOW (ref 3.8–5.2)
RBC # FLD: 16.9 % — HIGH (ref 10.3–14.5)
SARS-COV-2 RNA SPEC QL NAA+PROBE: DETECTED
SODIUM SERPL-SCNC: 127 MMOL/L — LOW (ref 135–145)
SODIUM SERPL-SCNC: 130 MMOL/L — LOW (ref 135–145)
WBC # BLD: 14.75 K/UL — HIGH (ref 3.8–10.5)
WBC # FLD AUTO: 14.75 K/UL — HIGH (ref 3.8–10.5)

## 2022-11-18 PROCEDURE — 93010 ELECTROCARDIOGRAM REPORT: CPT

## 2022-11-18 PROCEDURE — 71045 X-RAY EXAM CHEST 1 VIEW: CPT | Mod: 26

## 2022-11-18 PROCEDURE — 99232 SBSQ HOSP IP/OBS MODERATE 35: CPT

## 2022-11-18 RX ORDER — CEFAZOLIN SODIUM 1 G
2000 VIAL (EA) INJECTION EVERY 12 HOURS
Refills: 0 | Status: DISCONTINUED | OUTPATIENT
Start: 2022-11-18 | End: 2022-11-22

## 2022-11-18 RX ORDER — SODIUM CHLORIDE 9 MG/ML
1000 INJECTION INTRAMUSCULAR; INTRAVENOUS; SUBCUTANEOUS
Refills: 0 | Status: DISCONTINUED | OUTPATIENT
Start: 2022-11-18 | End: 2022-11-22

## 2022-11-18 RX ORDER — SODIUM ZIRCONIUM CYCLOSILICATE 10 G/10G
10 POWDER, FOR SUSPENSION ORAL ONCE
Refills: 0 | Status: COMPLETED | OUTPATIENT
Start: 2022-11-18 | End: 2022-11-18

## 2022-11-18 RX ADMIN — APIXABAN 2.5 MILLIGRAM(S): 2.5 TABLET, FILM COATED ORAL at 05:52

## 2022-11-18 RX ADMIN — OXYCODONE HYDROCHLORIDE 10 MILLIGRAM(S): 5 TABLET ORAL at 05:51

## 2022-11-18 RX ADMIN — OXYCODONE HYDROCHLORIDE 10 MILLIGRAM(S): 5 TABLET ORAL at 01:21

## 2022-11-18 RX ADMIN — Medication 975 MILLIGRAM(S): at 03:42

## 2022-11-18 RX ADMIN — RALOXIFENE HYDROCHLORIDE 60 MILLIGRAM(S): 60 TABLET, COATED ORAL at 12:36

## 2022-11-18 RX ADMIN — Medication 975 MILLIGRAM(S): at 17:56

## 2022-11-18 RX ADMIN — OXYCODONE HYDROCHLORIDE 10 MILLIGRAM(S): 5 TABLET ORAL at 02:40

## 2022-11-18 RX ADMIN — APIXABAN 2.5 MILLIGRAM(S): 2.5 TABLET, FILM COATED ORAL at 17:56

## 2022-11-18 RX ADMIN — SODIUM ZIRCONIUM CYCLOSILICATE 10 GRAM(S): 10 POWDER, FOR SUSPENSION ORAL at 22:01

## 2022-11-18 RX ADMIN — OXYCODONE HYDROCHLORIDE 10 MILLIGRAM(S): 5 TABLET ORAL at 11:43

## 2022-11-18 RX ADMIN — POLYETHYLENE GLYCOL 3350 17 GRAM(S): 17 POWDER, FOR SOLUTION ORAL at 21:56

## 2022-11-18 RX ADMIN — Medication 100 MILLIGRAM(S): at 05:51

## 2022-11-18 RX ADMIN — Medication 975 MILLIGRAM(S): at 18:26

## 2022-11-18 RX ADMIN — PANTOPRAZOLE SODIUM 40 MILLIGRAM(S): 20 TABLET, DELAYED RELEASE ORAL at 05:51

## 2022-11-18 RX ADMIN — Medication 1 TABLET(S): at 11:43

## 2022-11-18 RX ADMIN — Medication 1: at 12:36

## 2022-11-18 RX ADMIN — SENNA PLUS 2 TABLET(S): 8.6 TABLET ORAL at 21:55

## 2022-11-18 RX ADMIN — Medication 1: at 09:03

## 2022-11-18 RX ADMIN — OXYCODONE HYDROCHLORIDE 10 MILLIGRAM(S): 5 TABLET ORAL at 12:13

## 2022-11-18 RX ADMIN — Medication 975 MILLIGRAM(S): at 04:12

## 2022-11-18 RX ADMIN — ATORVASTATIN CALCIUM 80 MILLIGRAM(S): 80 TABLET, FILM COATED ORAL at 21:55

## 2022-11-18 RX ADMIN — Medication 100 MILLIGRAM(S): at 13:45

## 2022-11-18 RX ADMIN — POLYETHYLENE GLYCOL 3350 17 GRAM(S): 17 POWDER, FOR SOLUTION ORAL at 05:52

## 2022-11-18 NOTE — PROGRESS NOTE ADULT - SUBJECTIVE AND OBJECTIVE BOX
CC: F/U for [facility]    Saw/spoke to patient. No fevers, no chills. No new complaints.    Allergies  penicillins (Short breath (Severe))    ANTIMICROBIALS:  ceFAZolin   IVPB 2000 every 8 hours  ceFAZolin   IVPB 2000 every 12 hours    PE:    Vital Signs Last 24 Hrs  T(C): 36.8 (18 Nov 2022 13:05), Max: 36.9 (18 Nov 2022 10:20)  T(F): 98.3 (18 Nov 2022 13:05), Max: 98.4 (18 Nov 2022 10:20)  HR: 57 (18 Nov 2022 13:05) (57 - 110)  BP: 100/50 (18 Nov 2022 13:05) (100/50 - 133/63)  RR: 18 (18 Nov 2022 13:05) (18 - 18)  SpO2: 98% (18 Nov 2022 13:05) (97% - 100%)    Gen: AOx3, NAD, non-toxic  CV: Nontachycardic  Resp: Breathing comfortably, RA  Abd: Soft, nontender  IV/Skin: No thrombophlebitis    LABS:                        11.1   18.34 )-----------( 256      ( 17 Nov 2022 07:32 )             34.0     11-17    132<L>  |  103  |  24<H>  ----------------------------<  196<H>  5.2   |  17<L>  |  1.36<H>    Ca    7.8<L>      17 Nov 2022 07:29    MICROBIOLOGY:    .Tissue Other  11-16-22   Testing in progress  --    No polymorphonuclear leukocytes seen per low power field  Rare Gram positive cocci in pairs seen per oil power field    .Blood Blood-Peripheral  11-14-22   No growth to date.  --  --    .Blood Blood-Peripheral  11-14-22   No growth to date.  --  --    RADIOLOGY:    11/15 CT:    IMPRESSION:    1.  Right knee arthroplasty.  2.  Lucency posterior to the patellar component concerning for loosening  3.  Small knee joint effusion, nonspecific finding.  4.  Mild skin irregularity at the anterior aspect of the knee,   nonspecific but can be seen with cellulitis or skin wound.

## 2022-11-18 NOTE — PROGRESS NOTE ADULT - SUBJECTIVE AND OBJECTIVE BOX
Orthopedics     POD2 s/p stage 1 right revision TKA with antibiotic spacer    Patient seen and examined at bedside. Pain is overall controlled. Pt feeling well. No cp sob nausea or vomiting.    Vital Signs Last 24 Hrs  T(C): 36.8 (11-18-22 @ 05:39), Max: 36.8 (11-18-22 @ 05:39)  T(F): 98.3 (11-18-22 @ 05:39), Max: 98.3 (11-18-22 @ 05:39)  HR: 60 (11-18-22 @ 05:39) (60 - 110)  BP: 111/61 (11-18-22 @ 05:39) (111/61 - 133/63)  BP(mean): --  RR: 18 (11-18-22 @ 05:39) (18 - 18)  SpO2: 98% (11-18-22 @ 05:39) (97% - 100%)                            11.1   18.34 )-----------( 256      ( 17 Nov 2022 07:32 )             34.0       Exam:  Gen: NAD, resting comfortably  Prevena Dressing c/d/i; Knee immobilizer in place  +EHL/FHL/TA/GS  SILT L2-S1  +DP/PT 2+  Calf NTTP b/l  Compartments soft and compressible    A/P:  77yFemale Stable POD 2  s/p Stage 1 right revision tka with antibiotic spacer    -Cardio management appreciated  -ID recs/management appreciated, continue Ancef  -FU AM labs  -20% TTWB RLE in Knee Immobilizer  -Pain control PRN  -FU JEFFERSON output  -PT/OT  -FU OR Cx; GS negative  -DVT PE ppx: Eliquis  -Incentive spirometry  -FU BCx NGTD  -Dispo: [facility] per PT recs  -Will discuss with attending Dr. Reyes and advise if changes to plan Orthopedics     POD2 s/p stage 1 right revision TKA with antibiotic spacer    Patient seen and examined at bedside. Pain is overall controlled. Pt feeling well. No cp sob nausea or vomiting.    Vital Signs Last 24 Hrs  T(C): 36.8 (11-18-22 @ 05:39), Max: 36.8 (11-18-22 @ 05:39)  T(F): 98.3 (11-18-22 @ 05:39), Max: 98.3 (11-18-22 @ 05:39)  HR: 60 (11-18-22 @ 05:39) (60 - 110)  BP: 111/61 (11-18-22 @ 05:39) (111/61 - 133/63)  BP(mean): --  RR: 18 (11-18-22 @ 05:39) (18 - 18)  SpO2: 98% (11-18-22 @ 05:39) (97% - 100%)                            11.1   18.34 )-----------( 256      ( 17 Nov 2022 07:32 )             34.0       Exam:  Gen: NAD, resting comfortably  Prevena Dressing c/d/i; Knee immobilizer in place  +EHL/FHL/TA/GS  SILT L2-S1  +DP/PT 2+  Calf NTTP b/l  Compartments soft and compressible    A/P:  77yFemale Stable POD 2  s/p Stage 1 right revision tka with antibiotic spacer    -Cardio management appreciated  -ID recs/management appreciated, continue Ancef  -FU AM labs  -20% TTWB RLE in Knee Immobilizer - NO RANGE OF MOTION!!  -Pain control PRN  -FU JEFFERSON output  -PT/OT  -FU OR Cx; GS negative  -DVT PE ppx: Eliquis  -Incentive spirometry  -FU BCx NGTD  -Dispo: [facility] per PT recs  -Will discuss with attending Dr. Reyes and advise if changes to plan

## 2022-11-18 NOTE — PROGRESS NOTE ADULT - SUBJECTIVE AND OBJECTIVE BOX
Subjective: Patient seen and examined. No new events except as noted.   Complained of knee pain.     REVIEW OF SYSTEMS:    CONSTITUTIONAL: + weakness, fevers or chills  EYES/ENT: No visual changes;  No vertigo or throat pain   NECK: No pain or stiffness  RESPIRATORY: No cough, wheezing, hemoptysis; No shortness of breath  CARDIOVASCULAR: No chest pain or palpitations  GASTROINTESTINAL: No abdominal or epigastric pain. No nausea, vomiting, or hematemesis; No diarrhea or constipation. No melena or hematochezia.  GENITOURINARY: No dysuria, frequency or hematuria  NEUROLOGICAL: No numbness or weakness  SKIN: No itching, burning, rashes, or lesions   All other review of systems is negative unless indicated above.    MEDICATIONS:  MEDICATIONS  (STANDING):  acetaminophen   IVPB .. 1000 milliGRAM(s) IV Intermittent once  acetaminophen   IVPB .. 1000 milliGRAM(s) IV Intermittent once  apixaban 2.5 milliGRAM(s) Oral two times a day  atorvastatin 80 milliGRAM(s) Oral at bedtime  ceFAZolin   IVPB 2000 milliGRAM(s) IV Intermittent every 8 hours  HYDROmorphone  Injectable 0.5 milliGRAM(s) IV Push once  influenza  Vaccine (HIGH DOSE) 0.7 milliLiter(s) IntraMuscular once  insulin lispro (ADMELOG) corrective regimen sliding scale   SubCutaneous three times a day before meals  insulin lispro (ADMELOG) corrective regimen sliding scale   SubCutaneous at bedtime  losartan 100 milliGRAM(s) Oral daily  metoprolol succinate ER 50 milliGRAM(s) Oral daily  multivitamin 1 Tablet(s) Oral daily  pantoprazole    Tablet 40 milliGRAM(s) Oral before breakfast  polyethylene glycol 3350 17 Gram(s) Oral at bedtime  raloxifene 60 milliGRAM(s) Oral daily  senna 2 Tablet(s) Oral at bedtime  sodium chloride 0.9% Bolus 500 milliLiter(s) IV Bolus once  sodium chloride 0.9%. 1000 milliLiter(s) (150 mL/Hr) IV Continuous <Continuous>    PHYSICAL EXAM:  Vital Signs Last 24 Hrs  T(C): 36.8 (18 Nov 2022 20:52), Max: 36.9 (18 Nov 2022 10:20)  T(F): 98.3 (18 Nov 2022 20:52), Max: 98.4 (18 Nov 2022 10:20)  HR: 73 (18 Nov 2022 20:52) (57 - 76)  BP: 126/60 (18 Nov 2022 20:52) (100/50 - 128/63)  BP(mean): --  RR: 18 (18 Nov 2022 20:52) (18 - 18)  SpO2: 97% (18 Nov 2022 20:52) (97% - 99%)    Parameters below as of 18 Nov 2022 20:52  Patient On (Oxygen Delivery Method): room air    I&O's Summary    17 Nov 2022 07:01  -  18 Nov 2022 07:00  --------------------------------------------------------  IN: 750 mL / OUT: 1710 mL / NET: -960 mL    18 Nov 2022 07:01  -  18 Nov 2022 22:17  --------------------------------------------------------  IN: 50 mL / OUT: 930 mL / NET: -880 mL    Appearance: Normal	  HEENT: Normal oral mucosa, PERRL, EOMI	  Lymphatic: No lymphadenopathy , no edema  Cardiovascular: Normal S1 S2, No JVD, No murmurs , Peripheral pulses palpable 2+ bilaterally  Respiratory: Lungs clear to auscultation, normal effort 	  Gastrointestinal:  Soft, Non-tender, + BS	  Skin: No rashes, No ecchymoses, No cyanosis, warm to touch - LLE: BJKI; dressing c/d/i; JEFFERSON drain draining serosang fluid, knee mobilizer on   Musculoskeletal: Normal range of motion, normal strength  Psychiatry:  Mood & affect appropriate    LABS:    CARDIAC MARKERS:                        8.7    14.75 )-----------( 234      ( 18 Nov 2022 17:20 )             27.8     11-18    130<L>  |  101  |  26<H>  ----------------------------<  151<H>  5.6<H>   |  20<L>  |  1.95<H>    Ca    7.9<L>      18 Nov 2022 17:20    TELEMETRY: 	    ECG:  	  RADIOLOGY:   DIAGNOSTIC TESTING:  [ ] Echocardiogram:  [ ]  Catheterization:  [ ] Stress Test:    OTHER:

## 2022-11-18 NOTE — PROGRESS NOTE ADULT - SUBJECTIVE AND OBJECTIVE BOX
[facility]   [phone]  RENAL FOLLOW UP NOTE  --------------------------------------------------------------------------------  Chief Complaint:    24 hour events/subjective:    seen earlier   c/o pain in right knee     PAST HISTORY  --------------------------------------------------------------------------------  No significant changes to PMH, PSH, FHx, SHx, unless otherwise noted    ALLERGIES & MEDICATIONS  --------------------------------------------------------------------------------  Allergies    penicillins (Short breath (Severe))    Intolerances      Standing Inpatient Medications  acetaminophen     Tablet .. 975 milliGRAM(s) Oral every 8 hours  apixaban 2.5 milliGRAM(s) Oral two times a day  atorvastatin 80 milliGRAM(s) Oral at bedtime  ceFAZolin   IVPB 2000 milliGRAM(s) IV Intermittent every 12 hours  HYDROmorphone  Injectable 0.5 milliGRAM(s) IV Push once  influenza  Vaccine (HIGH DOSE) 0.7 milliLiter(s) IntraMuscular once  insulin lispro (ADMELOG) corrective regimen sliding scale   SubCutaneous three times a day before meals  insulin lispro (ADMELOG) corrective regimen sliding scale   SubCutaneous at bedtime  metoprolol succinate  milliGRAM(s) Oral daily  multivitamin 1 Tablet(s) Oral daily  pantoprazole    Tablet 40 milliGRAM(s) Oral before breakfast  polyethylene glycol 3350 17 Gram(s) Oral at bedtime  raloxifene 60 milliGRAM(s) Oral daily  senna 2 Tablet(s) Oral at bedtime  sodium chloride 0.9%. 1000 milliLiter(s) IV Continuous <Continuous>  sodium zirconium cyclosilicate 10 Gram(s) Oral once    PRN Inpatient Medications  magnesium hydroxide Suspension 30 milliLiter(s) Oral daily PRN  melatonin 3 milliGRAM(s) Oral at bedtime PRN  ondansetron Injectable 4 milliGRAM(s) IV Push every 6 hours PRN  oxyCODONE    IR 5 milliGRAM(s) Oral every 4 hours PRN  oxyCODONE    IR 10 milliGRAM(s) Oral every 4 hours PRN  traMADol 50 milliGRAM(s) Oral every 6 hours PRN      REVIEW OF SYSTEMS  --------------------------------------------------------------------------------    Gen: denies  fevers/chills,  CVS: denies chest pain/palpitations  Resp: denies SOB/Cough  GI: Denies N/V/Abd pain  : Denies dysuria/oliguria    VITALS/PHYSICAL EXAM  --------------------------------------------------------------------------------  T(C): 36.8 (11-18-22 @ 16:49), Max: 36.9 (11-18-22 @ 10:20)  HR: 64 (11-18-22 @ 16:49) (57 - 76)  BP: 127/61 (11-18-22 @ 16:49) (100/50 - 128/63)  RR: 18 (11-18-22 @ 16:49) (18 - 18)  SpO2: 99% (11-18-22 @ 16:49) (98% - 99%)  Wt(kg): --        11-17-22 @ 07:01  -  11-18-22 @ 07:00  --------------------------------------------------------  IN: 750 mL / OUT: 1710 mL / NET: -960 mL    11-18-22 @ 07:01  -  11-18-22 @ 20:51  --------------------------------------------------------  IN: 50 mL / OUT: 675 mL / NET: -625 mL      Physical Exam:  	    	Gen: Non toxic comfortable appearing   	Pulm: decrease bs  no rales or ronchi or wheezing  	CV: No JVD. RRR, S1S2; no rub  	Abd: +BS, soft, nontender/nondistended  	: no  suprapubic tenderness  	UE: Warm, no cyanosis  no clubbing,  no edema;  	LE: Warm,no edema, R knee dressing and brace  	Neuro: alert and oriented. speech coherent       LABS/STUDIES  --------------------------------------------------------------------------------              8.7    14.75 >-----------<  234      [11-18-22 @ 17:20]              27.8     130  |  101  |  26  ----------------------------<  151      [11-18-22 @ 17:20]  5.6   |  20  |  1.95        Ca     7.9     [11-18-22 @ 17:20]            Creatinine Trend:  SCr 1.95 [11-18 @ 17:20]  SCr 1.36 [11-17 @ 07:29]  SCr 1.50 [11-16 @ 23:08]  SCr 2.01 [11-16 @ 04:19]  SCr 1.96 [11-15 @ 18:42]              Urinalysis - [11-15-22 @ 20:41]      Color Light Yellow / Appearance Clear / SG 1.007 / pH 6.5      Gluc Negative / Ketone Negative  / Bili Negative / Urobili Negative       Blood Negative / Protein 30 mg/dL / Leuk Est Negative / Nitrite Negative      RBC 0 / WBC 1 / Hyaline  / Gran  / Sq Epi  / Non Sq Epi 1 / Bacteria Negative      Iron 52, TIBC 216, %sat 24      [11-16-22 @ 04:19]  Ferritin 429      [11-16-22 @ 04:19]

## 2022-11-18 NOTE — PROVIDER CONTACT NOTE (CRITICAL VALUE NOTIFICATION) - NAME OF MD/NP/PA/DO NOTIFIED:
What Type Of Note Output Would You Prefer (Optional)?: Standard Output How Severe Is Your Rash?: mild Is This A New Presentation, Or A Follow-Up?: Rash MD Alec Gonzalez

## 2022-11-18 NOTE — CONSULT NOTE ADULT - SUBJECTIVE AND OBJECTIVE BOX
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 Patient requiring PICC placement for antibiotics. PICC team requesting IR clearance as patient is on apixaban.  -Labs and vitals reviewed, ok for PICC placement by bedside PICC team.

## 2022-11-19 LAB
A1C WITH ESTIMATED AVERAGE GLUCOSE RESULT: 6 % — HIGH (ref 4–5.6)
ALBUMIN SERPL ELPH-MCNC: 2.7 G/DL — LOW (ref 3.3–5)
ALP SERPL-CCNC: 65 U/L — SIGNIFICANT CHANGE UP (ref 40–120)
ALT FLD-CCNC: <5 U/L — LOW (ref 10–45)
ANION GAP SERPL CALC-SCNC: 10 MMOL/L — SIGNIFICANT CHANGE UP (ref 5–17)
AST SERPL-CCNC: 25 U/L — SIGNIFICANT CHANGE UP (ref 10–40)
BILIRUB SERPL-MCNC: 0.2 MG/DL — SIGNIFICANT CHANGE UP (ref 0.2–1.2)
BUN SERPL-MCNC: 26 MG/DL — HIGH (ref 7–23)
CALCIUM SERPL-MCNC: 8.1 MG/DL — LOW (ref 8.4–10.5)
CHLORIDE SERPL-SCNC: 104 MMOL/L — SIGNIFICANT CHANGE UP (ref 96–108)
CO2 SERPL-SCNC: 15 MMOL/L — LOW (ref 22–31)
CREAT SERPL-MCNC: 1.59 MG/DL — HIGH (ref 0.5–1.3)
CULTURE RESULTS: SIGNIFICANT CHANGE UP
CULTURE RESULTS: SIGNIFICANT CHANGE UP
EGFR: 33 ML/MIN/1.73M2 — LOW
ESTIMATED AVERAGE GLUCOSE: 126 MG/DL — HIGH (ref 68–114)
GLUCOSE SERPL-MCNC: 122 MG/DL — HIGH (ref 70–99)
POTASSIUM SERPL-MCNC: 4.8 MMOL/L — SIGNIFICANT CHANGE UP (ref 3.5–5.3)
POTASSIUM SERPL-SCNC: 4.8 MMOL/L — SIGNIFICANT CHANGE UP (ref 3.5–5.3)
PROT SERPL-MCNC: 6.7 G/DL — SIGNIFICANT CHANGE UP (ref 6–8.3)
SARS-COV-2 RNA SPEC QL NAA+PROBE: SIGNIFICANT CHANGE UP
SARS-COV-2 RNA SPEC QL NAA+PROBE: SIGNIFICANT CHANGE UP
SODIUM SERPL-SCNC: 129 MMOL/L — LOW (ref 135–145)
SPECIMEN SOURCE: SIGNIFICANT CHANGE UP
SPECIMEN SOURCE: SIGNIFICANT CHANGE UP

## 2022-11-19 RX ADMIN — OXYCODONE HYDROCHLORIDE 10 MILLIGRAM(S): 5 TABLET ORAL at 06:20

## 2022-11-19 RX ADMIN — Medication 100 MILLIGRAM(S): at 01:45

## 2022-11-19 RX ADMIN — ATORVASTATIN CALCIUM 80 MILLIGRAM(S): 80 TABLET, FILM COATED ORAL at 21:37

## 2022-11-19 RX ADMIN — Medication 975 MILLIGRAM(S): at 16:22

## 2022-11-19 RX ADMIN — OXYCODONE HYDROCHLORIDE 10 MILLIGRAM(S): 5 TABLET ORAL at 13:48

## 2022-11-19 RX ADMIN — PANTOPRAZOLE SODIUM 40 MILLIGRAM(S): 20 TABLET, DELAYED RELEASE ORAL at 06:21

## 2022-11-19 RX ADMIN — Medication 975 MILLIGRAM(S): at 15:52

## 2022-11-19 RX ADMIN — APIXABAN 2.5 MILLIGRAM(S): 2.5 TABLET, FILM COATED ORAL at 18:10

## 2022-11-19 RX ADMIN — OXYCODONE HYDROCHLORIDE 10 MILLIGRAM(S): 5 TABLET ORAL at 21:38

## 2022-11-19 RX ADMIN — OXYCODONE HYDROCHLORIDE 10 MILLIGRAM(S): 5 TABLET ORAL at 01:56

## 2022-11-19 RX ADMIN — Medication 100 MILLIGRAM(S): at 13:18

## 2022-11-19 RX ADMIN — Medication 1 TABLET(S): at 12:21

## 2022-11-19 RX ADMIN — RALOXIFENE HYDROCHLORIDE 60 MILLIGRAM(S): 60 TABLET, COATED ORAL at 12:21

## 2022-11-19 RX ADMIN — OXYCODONE HYDROCHLORIDE 10 MILLIGRAM(S): 5 TABLET ORAL at 22:10

## 2022-11-19 RX ADMIN — Medication 1: at 21:43

## 2022-11-19 RX ADMIN — Medication 975 MILLIGRAM(S): at 09:05

## 2022-11-19 RX ADMIN — SENNA PLUS 2 TABLET(S): 8.6 TABLET ORAL at 21:37

## 2022-11-19 RX ADMIN — Medication 975 MILLIGRAM(S): at 00:26

## 2022-11-19 RX ADMIN — Medication 975 MILLIGRAM(S): at 08:35

## 2022-11-19 RX ADMIN — OXYCODONE HYDROCHLORIDE 10 MILLIGRAM(S): 5 TABLET ORAL at 13:18

## 2022-11-19 RX ADMIN — MAGNESIUM HYDROXIDE 30 MILLILITER(S): 400 TABLET, CHEWABLE ORAL at 06:21

## 2022-11-19 RX ADMIN — POLYETHYLENE GLYCOL 3350 17 GRAM(S): 17 POWDER, FOR SOLUTION ORAL at 21:38

## 2022-11-19 RX ADMIN — Medication 1: at 13:03

## 2022-11-19 RX ADMIN — APIXABAN 2.5 MILLIGRAM(S): 2.5 TABLET, FILM COATED ORAL at 05:49

## 2022-11-19 RX ADMIN — Medication 100 MILLIGRAM(S): at 05:49

## 2022-11-19 NOTE — PROGRESS NOTE ADULT - SUBJECTIVE AND OBJECTIVE BOX
Orthopedics     POD3 s/p stage 1 right revision TKA with antibiotic spacer    Patient seen and examined at bedside. Pain is overall controlled. Pt feeling well. No cp sob nausea or vomiting. Patient had RUE PICC line placed yesterday. Patient had discharge screening COVID test that was positive, repeat pending for this morning.     Vital Signs Last 24 Hrs  T(C): 37.1 (19 Nov 2022 08:14), Max: 37.4 (19 Nov 2022 01:32)  T(F): 98.7 (19 Nov 2022 08:14), Max: 99.3 (19 Nov 2022 01:32)  HR: 96 (19 Nov 2022 08:14) (57 - 96)  BP: 140/65 (19 Nov 2022 08:14) (100/50 - 146/69)  BP(mean): --  RR: 18 (19 Nov 2022 08:14) (18 - 18)  SpO2: 96% (19 Nov 2022 08:14) (96% - 100%)    Parameters below as of 19 Nov 2022 08:14  Patient On (Oxygen Delivery Method): room air                          8.7    14.75 )-----------( 234      ( 18 Nov 2022 17:20 )             27.8         Exam:  Gen: NAD, resting comfortably  Prevena Dressing c/d/i; Knee immobilizer in place  +EHL/FHL/TA/GS  SILT L2-S1  +DP/PT 2+  Calf NTTP b/l  Compartments soft and compressible    A/P:  77yFemale Stable POD 3  s/p Stage 1 right revision tka with antibiotic spacer    -Cardio management appreciated  -ID recs/management appreciated, continue Ancef  -FU AM labs  -ESR/CRP: 59/65  -20% TTWB RLE in Knee Immobilizer - NO RANGE OF MOTION!!  -Pain control PRN  -FU JEFFERSON output  -PT/OT  -FU OR Cx: MSSA  -DVT PE ppx: Eliquis  -Incentive spirometry  -FU BCx NGTD  -Dispo: [facility] per PT recs; Discharge COVID screen was positive, repeat pending to determine dispo plans  -Will discuss with attending Dr. Reyes and advise if changes to plan

## 2022-11-19 NOTE — PROGRESS NOTE ADULT - SUBJECTIVE AND OBJECTIVE BOX
[facility]   [phone]  RENAL FOLLOW UP NOTE  --------------------------------------------------------------------------------  Chief Complaint:    24 hour events/subjective:    seen earlier   c/o pain in leg   c/o constipation     PAST HISTORY  --------------------------------------------------------------------------------  No significant changes to PMH, PSH, FHx, SHx, unless otherwise noted    ALLERGIES & MEDICATIONS  --------------------------------------------------------------------------------  Allergies    penicillins (Short breath (Severe))    Intolerances      Standing Inpatient Medications  acetaminophen     Tablet .. 975 milliGRAM(s) Oral every 8 hours  apixaban 2.5 milliGRAM(s) Oral two times a day  atorvastatin 80 milliGRAM(s) Oral at bedtime  ceFAZolin   IVPB 2000 milliGRAM(s) IV Intermittent every 12 hours  HYDROmorphone  Injectable 0.5 milliGRAM(s) IV Push once  influenza  Vaccine (HIGH DOSE) 0.7 milliLiter(s) IntraMuscular once  insulin lispro (ADMELOG) corrective regimen sliding scale   SubCutaneous three times a day before meals  insulin lispro (ADMELOG) corrective regimen sliding scale   SubCutaneous at bedtime  metoprolol succinate  milliGRAM(s) Oral daily  multivitamin 1 Tablet(s) Oral daily  pantoprazole    Tablet 40 milliGRAM(s) Oral before breakfast  polyethylene glycol 3350 17 Gram(s) Oral at bedtime  raloxifene 60 milliGRAM(s) Oral daily  senna 2 Tablet(s) Oral at bedtime  sodium chloride 0.9%. 1000 milliLiter(s) IV Continuous <Continuous>    PRN Inpatient Medications  magnesium hydroxide Suspension 30 milliLiter(s) Oral daily PRN  melatonin 3 milliGRAM(s) Oral at bedtime PRN  ondansetron Injectable 4 milliGRAM(s) IV Push every 6 hours PRN  oxyCODONE    IR 5 milliGRAM(s) Oral every 4 hours PRN  oxyCODONE    IR 10 milliGRAM(s) Oral every 4 hours PRN  traMADol 50 milliGRAM(s) Oral every 6 hours PRN      REVIEW OF SYSTEMS  --------------------------------------------------------------------------------    Gen: denies  fevers/chills,  CVS: denies chest pain/palpitations  Resp: denies SOB/Cough  GI: Denies N/V/Abd pain  : Denies dysuria    VITALS/PHYSICAL EXAM  --------------------------------------------------------------------------------  T(C): 36.8 (11-19-22 @ 16:34), Max: 37.4 (11-19-22 @ 01:32)  HR: 98 (11-19-22 @ 16:34) (73 - 108)  BP: 127/60 (11-19-22 @ 16:34) (125/65 - 156/71)  RR: 18 (11-19-22 @ 16:34) (18 - 18)  SpO2: 96% (11-19-22 @ 16:34) (96% - 100%)  Wt(kg): --        11-18-22 @ 07:01  -  11-19-22 @ 07:00  --------------------------------------------------------  IN: 50 mL / OUT: 3235 mL / NET: -3185 mL    11-19-22 @ 07:01  -  11-19-22 @ 20:15  --------------------------------------------------------  IN: 300 mL / OUT: 1220 mL / NET: -920 mL      Physical Exam:  	      	Gen: Non toxic comfortable appearing   	Pulm: decrease bs  no rales or ronchi or wheezing  	CV: No JVD. RRR, S1S2; no rub  	Abd: + hypoactive BS, soft, nontender/nondistended  	: no  suprapubic tenderness  	UE: Warm, no cyanosis  no clubbing,  no edema;  	LE: Warm,no edema, R knee dressing and brace  	Neuro: alert and oriented. speech coherent       LABS/STUDIES  --------------------------------------------------------------------------------              8.7    14.75 >-----------<  234      [11-18-22 @ 17:20]              27.8     129  |  104  |  26  ----------------------------<  122      [11-19-22 @ 07:38]  4.8   |  15  |  1.59        Ca     8.1     [11-19-22 @ 07:38]    TPro  6.7  /  Alb  2.7  /  TBili  0.2  /  DBili  x   /  AST  25  /  ALT  <5  /  AlkPhos  65  [11-19-22 @ 07:38]              [11-18-22 @ 23:25]    Creatinine Trend:  SCr 1.59 [11-19 @ 07:38]  SCr 1.89 [11-18 @ 23:25]  SCr 1.95 [11-18 @ 17:20]  SCr 1.36 [11-17 @ 07:29]  SCr 1.50 [11-16 @ 23:08]              Urinalysis - [11-15-22 @ 20:41]      Color Light Yellow / Appearance Clear / SG 1.007 / pH 6.5      Gluc Negative / Ketone Negative  / Bili Negative / Urobili Negative       Blood Negative / Protein 30 mg/dL / Leuk Est Negative / Nitrite Negative      RBC 0 / WBC 1 / Hyaline  / Gran  / Sq Epi  / Non Sq Epi 1 / Bacteria Negative      Iron 52, TIBC 216, %sat 24      [11-16-22 @ 04:19]  Ferritin 429      [11-16-22 @ 04:19]

## 2022-11-19 NOTE — PROGRESS NOTE ADULT - SUBJECTIVE AND OBJECTIVE BOX
Subjective: Patient seen and examined. No new events except as noted.     REVIEW OF SYSTEMS:    CONSTITUTIONAL: + weakness, fevers or chills  EYES/ENT: No visual changes;  No vertigo or throat pain   NECK: No pain or stiffness  RESPIRATORY: No cough, wheezing, hemoptysis; No shortness of breath  CARDIOVASCULAR: No chest pain or palpitations  GASTROINTESTINAL: No abdominal or epigastric pain. No nausea, vomiting, or hematemesis; No diarrhea or constipation. No melena or hematochezia.  GENITOURINARY: No dysuria, frequency or hematuria  NEUROLOGICAL: No numbness or weakness  SKIN: No itching, burning, rashes, or lesions   All other review of systems is negative unless indicated above.    MEDICATIONS:  MEDICATIONS  (STANDING):  acetaminophen   IVPB .. 1000 milliGRAM(s) IV Intermittent once  acetaminophen   IVPB .. 1000 milliGRAM(s) IV Intermittent once  apixaban 2.5 milliGRAM(s) Oral two times a day  atorvastatin 80 milliGRAM(s) Oral at bedtime  ceFAZolin   IVPB 2000 milliGRAM(s) IV Intermittent every 8 hours  HYDROmorphone  Injectable 0.5 milliGRAM(s) IV Push once  influenza  Vaccine (HIGH DOSE) 0.7 milliLiter(s) IntraMuscular once  insulin lispro (ADMELOG) corrective regimen sliding scale   SubCutaneous three times a day before meals  insulin lispro (ADMELOG) corrective regimen sliding scale   SubCutaneous at bedtime  losartan 100 milliGRAM(s) Oral daily  metoprolol succinate ER 50 milliGRAM(s) Oral daily  multivitamin 1 Tablet(s) Oral daily  pantoprazole    Tablet 40 milliGRAM(s) Oral before breakfast  polyethylene glycol 3350 17 Gram(s) Oral at bedtime  raloxifene 60 milliGRAM(s) Oral daily  senna 2 Tablet(s) Oral at bedtime  sodium chloride 0.9% Bolus 500 milliLiter(s) IV Bolus once  sodium chloride 0.9%. 1000 milliLiter(s) (150 mL/Hr) IV Continuous <Continuous>    PHYSICAL EXAM:  Vital Signs Last 24 Hrs  T(C): 37.1 (19 Nov 2022 08:14), Max: 37.4 (19 Nov 2022 01:32)  T(F): 98.7 (19 Nov 2022 08:14), Max: 99.3 (19 Nov 2022 01:32)  HR: 96 (19 Nov 2022 08:14) (57 - 96)  BP: 140/65 (19 Nov 2022 08:14) (100/50 - 146/69)  BP(mean): --  RR: 18 (19 Nov 2022 08:14) (18 - 18)  SpO2: 96% (19 Nov 2022 08:14) (96% - 100%)    Parameters below as of 19 Nov 2022 08:14  Patient On (Oxygen Delivery Method): room air    I&O's Summary    18 Nov 2022 07:01  -  19 Nov 2022 07:00  --------------------------------------------------------  IN: 50 mL / OUT: 3235 mL / NET: -3185 mL    Appearance: Normal	  HEENT: Normal oral mucosa, PERRL, EOMI	  Lymphatic: No lymphadenopathy , no edema  Cardiovascular: Normal S1 S2, No JVD, No murmurs , Peripheral pulses palpable 2+ bilaterally  Respiratory: Lungs clear to auscultation, normal effort 	  Gastrointestinal:  Soft, Non-tender, + BS	  Skin: No rashes, No ecchymoses, No cyanosis, warm to touch - LLE: BJKI; dressing c/d/i; JEFFERSON drain draining serosang fluid, knee mobilizer on - RUE PICC  Musculoskeletal: Normal range of motion, normal strength  Psychiatry:  Mood & affect appropriate    LABS:    CARDIAC MARKERS:                        8.7    14.75 )-----------( 234      ( 18 Nov 2022 17:20 )             27.8     11-19    129<L>  |  104  |  26<H>  ----------------------------<  122<H>  4.8   |  15<L>  |  1.59<H>    Ca    8.1<L>      19 Nov 2022 07:38    TPro  6.7  /  Alb  2.7<L>  /  TBili  0.2  /  DBili  x   /  AST  25  /  ALT  <5<L>  /  AlkPhos  65  11-19    TELEMETRY: 	    ECG:  	  RADIOLOGY:   DIAGNOSTIC TESTING:  [ ] Echocardiogram:  [ ]  Catheterization:  [ ] Stress Test:    OTHER:

## 2022-11-20 LAB
ALBUMIN SERPL ELPH-MCNC: 2.7 G/DL — LOW (ref 3.3–5)
ALP SERPL-CCNC: 53 U/L — SIGNIFICANT CHANGE UP (ref 40–120)
ALT FLD-CCNC: <5 U/L — LOW (ref 10–45)
ANION GAP SERPL CALC-SCNC: 7 MMOL/L — SIGNIFICANT CHANGE UP (ref 5–17)
AST SERPL-CCNC: 14 U/L — SIGNIFICANT CHANGE UP (ref 10–40)
BILIRUB SERPL-MCNC: 0.2 MG/DL — SIGNIFICANT CHANGE UP (ref 0.2–1.2)
BUN SERPL-MCNC: 20 MG/DL — SIGNIFICANT CHANGE UP (ref 7–23)
CALCIUM SERPL-MCNC: 8.3 MG/DL — LOW (ref 8.4–10.5)
CHLORIDE SERPL-SCNC: 102 MMOL/L — SIGNIFICANT CHANGE UP (ref 96–108)
CO2 SERPL-SCNC: 25 MMOL/L — SIGNIFICANT CHANGE UP (ref 22–31)
CREAT SERPL-MCNC: 1.19 MG/DL — SIGNIFICANT CHANGE UP (ref 0.5–1.3)
EGFR: 47 ML/MIN/1.73M2 — LOW
GLUCOSE SERPL-MCNC: 174 MG/DL — HIGH (ref 70–99)
POTASSIUM SERPL-MCNC: 4.5 MMOL/L — SIGNIFICANT CHANGE UP (ref 3.5–5.3)
POTASSIUM SERPL-SCNC: 4.5 MMOL/L — SIGNIFICANT CHANGE UP (ref 3.5–5.3)
PROT SERPL-MCNC: 5.8 G/DL — LOW (ref 6–8.3)
SODIUM SERPL-SCNC: 134 MMOL/L — LOW (ref 135–145)

## 2022-11-20 RX ORDER — CHLORHEXIDINE GLUCONATE 213 G/1000ML
1 SOLUTION TOPICAL DAILY
Refills: 0 | Status: DISCONTINUED | OUTPATIENT
Start: 2022-11-20 | End: 2022-11-22

## 2022-11-20 RX ADMIN — POLYETHYLENE GLYCOL 3350 17 GRAM(S): 17 POWDER, FOR SOLUTION ORAL at 22:05

## 2022-11-20 RX ADMIN — RALOXIFENE HYDROCHLORIDE 60 MILLIGRAM(S): 60 TABLET, COATED ORAL at 12:00

## 2022-11-20 RX ADMIN — Medication 1: at 12:28

## 2022-11-20 RX ADMIN — Medication 100 MILLIGRAM(S): at 05:12

## 2022-11-20 RX ADMIN — Medication 975 MILLIGRAM(S): at 17:44

## 2022-11-20 RX ADMIN — OXYCODONE HYDROCHLORIDE 10 MILLIGRAM(S): 5 TABLET ORAL at 20:00

## 2022-11-20 RX ADMIN — Medication 975 MILLIGRAM(S): at 00:52

## 2022-11-20 RX ADMIN — ATORVASTATIN CALCIUM 80 MILLIGRAM(S): 80 TABLET, FILM COATED ORAL at 22:05

## 2022-11-20 RX ADMIN — Medication 975 MILLIGRAM(S): at 01:30

## 2022-11-20 RX ADMIN — Medication 1 TABLET(S): at 12:00

## 2022-11-20 RX ADMIN — OXYCODONE HYDROCHLORIDE 10 MILLIGRAM(S): 5 TABLET ORAL at 08:58

## 2022-11-20 RX ADMIN — Medication 975 MILLIGRAM(S): at 18:14

## 2022-11-20 RX ADMIN — Medication 100 MILLIGRAM(S): at 13:20

## 2022-11-20 RX ADMIN — CHLORHEXIDINE GLUCONATE 1 APPLICATION(S): 213 SOLUTION TOPICAL at 12:00

## 2022-11-20 RX ADMIN — Medication 100 MILLIGRAM(S): at 02:58

## 2022-11-20 RX ADMIN — APIXABAN 2.5 MILLIGRAM(S): 2.5 TABLET, FILM COATED ORAL at 05:12

## 2022-11-20 RX ADMIN — PANTOPRAZOLE SODIUM 40 MILLIGRAM(S): 20 TABLET, DELAYED RELEASE ORAL at 05:12

## 2022-11-20 RX ADMIN — Medication 975 MILLIGRAM(S): at 09:28

## 2022-11-20 RX ADMIN — OXYCODONE HYDROCHLORIDE 10 MILLIGRAM(S): 5 TABLET ORAL at 09:28

## 2022-11-20 RX ADMIN — OXYCODONE HYDROCHLORIDE 10 MILLIGRAM(S): 5 TABLET ORAL at 19:44

## 2022-11-20 RX ADMIN — SENNA PLUS 2 TABLET(S): 8.6 TABLET ORAL at 22:05

## 2022-11-20 RX ADMIN — APIXABAN 2.5 MILLIGRAM(S): 2.5 TABLET, FILM COATED ORAL at 17:44

## 2022-11-20 RX ADMIN — Medication 975 MILLIGRAM(S): at 08:58

## 2022-11-20 NOTE — PROGRESS NOTE ADULT - SUBJECTIVE AND OBJECTIVE BOX
Pt was seen bedside Rm 11D The knee immobilizer was removed and replaced with a yajaira type brace. Brace was applied with the help of a nurse. Pt was instructed on the donning and doffing and was given 2 suspension sleeves to be applied once the dressing is dry. Brace was locked out according to Dr. day. Brace fit well and was delivered. Sleeves will aid in suspension and for hygiene purposes. Any questions ,please call [facility] at [phone]

## 2022-11-20 NOTE — PROGRESS NOTE ADULT - SUBJECTIVE AND OBJECTIVE BOX
Subjective: Patient seen and examined. No new events except as noted.     REVIEW OF SYSTEMS:    CONSTITUTIONAL: +weakness, fevers or chills  EYES/ENT: No visual changes;  No vertigo or throat pain   NECK: No pain or stiffness  RESPIRATORY: No cough, wheezing, hemoptysis; No shortness of breath  CARDIOVASCULAR: No chest pain or palpitations  GASTROINTESTINAL: No abdominal or epigastric pain. No nausea, vomiting, or hematemesis; No diarrhea or constipation. No melena or hematochezia.  GENITOURINARY: No dysuria, frequency or hematuria  NEUROLOGICAL: No numbness or weakness  SKIN: No itching, burning, rashes, or lesions   All other review of systems is negative unless indicated above.    MEDICATIONS:  MEDICATIONS  (STANDING):  acetaminophen     Tablet .. 975 milliGRAM(s) Oral every 8 hours  apixaban 2.5 milliGRAM(s) Oral two times a day  atorvastatin 80 milliGRAM(s) Oral at bedtime  ceFAZolin   IVPB 2000 milliGRAM(s) IV Intermittent every 12 hours  chlorhexidine 2% Cloths 1 Application(s) Topical daily  HYDROmorphone  Injectable 0.5 milliGRAM(s) IV Push once  influenza  Vaccine (HIGH DOSE) 0.7 milliLiter(s) IntraMuscular once  insulin lispro (ADMELOG) corrective regimen sliding scale   SubCutaneous three times a day before meals  insulin lispro (ADMELOG) corrective regimen sliding scale   SubCutaneous at bedtime  metoprolol succinate  milliGRAM(s) Oral daily  multivitamin 1 Tablet(s) Oral daily  pantoprazole    Tablet 40 milliGRAM(s) Oral before breakfast  polyethylene glycol 3350 17 Gram(s) Oral at bedtime  raloxifene 60 milliGRAM(s) Oral daily  senna 2 Tablet(s) Oral at bedtime  sodium chloride 0.9%. 1000 milliLiter(s) (75 mL/Hr) IV Continuous <Continuous>      PHYSICAL EXAM:  T(C): 36.9 (11-20-22 @ 05:00), Max: 37.2 (11-20-22 @ 00:50)  HR: 100 (11-20-22 @ 05:00) (85 - 108)  BP: 144/74 (11-20-22 @ 05:00) (127/60 - 156/71)  RR: 18 (11-20-22 @ 05:00) (18 - 18)  SpO2: 98% (11-20-22 @ 05:00) (96% - 98%)  Wt(kg): --  I&O's Summary    19 Nov 2022 07:01  -  20 Nov 2022 07:00  --------------------------------------------------------  IN: 300 mL / OUT: 4040 mL / NET: -3740 mL          Appearance: Normal	  HEENT: Normal oral mucosa, PERRL, EOMI	  Lymphatic: No lymphadenopathy , no edema  Cardiovascular: Normal S1 S2, No JVD, No murmurs , Peripheral pulses palpable 2+ bilaterally  Respiratory: Lungs clear to auscultation, normal effort 	  Gastrointestinal:  Soft, Non-tender, + BS	  Skin: No rashes, No ecchymoses, No cyanosis, warm to touch - LLE: BJKI; dressing c/d/i; JEFFERSON drain draining serosang fluid, knee mobilizer on - RUE PICC  Musculoskeletal: Normal range of motion, normal strength  Psychiatry:  Mood & affect appropriate    LABS:    CARDIAC MARKERS:  CARDIAC MARKERS ( 18 Nov 2022 23:25 )  x     / x     / 217 U/L / x     / x                                    8.7    14.75 )-----------( 234      ( 18 Nov 2022 17:20 )             27.8     11-19    129<L>  |  104  |  26<H>  ----------------------------<  122<H>  4.8   |  15<L>  |  1.59<H>    Ca    8.1<L>      19 Nov 2022 07:38    TPro  6.7  /  Alb  2.7<L>  /  TBili  0.2  /  DBili  x   /  AST  25  /  ALT  <5<L>  /  AlkPhos  65  11-19    proBNP:   Lipid Profile:   HgA1c:   TSH:             TELEMETRY: 	    ECG:  	  RADIOLOGY:   DIAGNOSTIC TESTING:  [ ] Echocardiogram:  [ ]  Catheterization:  [ ] Stress Test:    OTHER:

## 2022-11-20 NOTE — PROGRESS NOTE ADULT - SUBJECTIVE AND OBJECTIVE BOX
[facility]   [phone]  RENAL FOLLOW UP NOTE  --------------------------------------------------------------------------------  Chief Complaint:    24 hour events/subjective:    seen earlier.   + c/o right knee pain   no dysuria sx     PAST HISTORY  --------------------------------------------------------------------------------  No significant changes to PMH, PSH, FHx, SHx, unless otherwise noted    ALLERGIES & MEDICATIONS  --------------------------------------------------------------------------------  Allergies    penicillins (Short breath (Severe))    Intolerances      Standing Inpatient Medications  acetaminophen     Tablet .. 975 milliGRAM(s) Oral every 8 hours  apixaban 2.5 milliGRAM(s) Oral two times a day  atorvastatin 80 milliGRAM(s) Oral at bedtime  ceFAZolin   IVPB 2000 milliGRAM(s) IV Intermittent every 12 hours  chlorhexidine 2% Cloths 1 Application(s) Topical daily  HYDROmorphone  Injectable 0.5 milliGRAM(s) IV Push once  influenza  Vaccine (HIGH DOSE) 0.7 milliLiter(s) IntraMuscular once  insulin lispro (ADMELOG) corrective regimen sliding scale   SubCutaneous at bedtime  insulin lispro (ADMELOG) corrective regimen sliding scale   SubCutaneous three times a day before meals  metoprolol succinate  milliGRAM(s) Oral daily  multivitamin 1 Tablet(s) Oral daily  pantoprazole    Tablet 40 milliGRAM(s) Oral before breakfast  polyethylene glycol 3350 17 Gram(s) Oral at bedtime  raloxifene 60 milliGRAM(s) Oral daily  senna 2 Tablet(s) Oral at bedtime  sodium chloride 0.9%. 1000 milliLiter(s) IV Continuous <Continuous>    PRN Inpatient Medications  magnesium hydroxide Suspension 30 milliLiter(s) Oral daily PRN  melatonin 3 milliGRAM(s) Oral at bedtime PRN  ondansetron Injectable 4 milliGRAM(s) IV Push every 6 hours PRN  oxyCODONE    IR 10 milliGRAM(s) Oral every 4 hours PRN  oxyCODONE    IR 5 milliGRAM(s) Oral every 4 hours PRN  traMADol 50 milliGRAM(s) Oral every 6 hours PRN      REVIEW OF SYSTEMS  --------------------------------------------------------------------------------    Gen: denies  fevers/chills,  CVS: denies chest pain/palpitations  Resp: denies SOB/Cough  GI: Denies N/V/Abd pain  : Denies dysuria    VITALS/PHYSICAL EXAM  --------------------------------------------------------------------------------  T(C): 37 (11-20-22 @ 13:18), Max: 37.2 (11-20-22 @ 00:50)  HR: 56 (11-20-22 @ 13:18) (56 - 106)  BP: 104/53 (11-20-22 @ 13:18) (104/53 - 144/74)  RR: 18 (11-20-22 @ 13:18) (18 - 18)  SpO2: 100% (11-20-22 @ 13:18) (96% - 100%)  Wt(kg): --        11-19-22 @ 07:01  -  11-20-22 @ 07:00  --------------------------------------------------------  IN: 300 mL / OUT: 4040 mL / NET: -3740 mL    11-20-22 @ 07:01  -  11-20-22 @ 16:00  --------------------------------------------------------  IN: 530 mL / OUT: 1080 mL / NET: -550 mL      Physical Exam:  	    	Gen: Non toxic comfortable appearing   	Pulm: decrease bs  no rales or ronchi or wheezing  	CV: No JVD. RRR, S1S2; no rub  	Abd: + hypoactive BS, soft, nontender/nondistended  	: no  suprapubic tenderness  	UE: Warm, no cyanosis  no clubbing,  no edema;  	LE: Warm,no edema, R knee dressing and brace  	Neuro: alert and oriented. speech coherent     LABS/STUDIES  --------------------------------------------------------------------------------              8.7    14.75 >-----------<  234      [11-18-22 @ 17:20]              27.8     134  |  102  |  20  ----------------------------<  174      [11-20-22 @ 11:46]  4.5   |  25  |  1.19        Ca     8.3     [11-20-22 @ 11:46]    TPro  5.8  /  Alb  2.7  /  TBili  0.2  /  DBili  x   /  AST  14  /  ALT  <5  /  AlkPhos  53  [11-20-22 @ 11:46]              [11-18-22 @ 23:25]    Creatinine Trend:  SCr 1.19 [11-20 @ 11:46]  SCr 1.59 [11-19 @ 07:38]  SCr 1.89 [11-18 @ 23:25]  SCr 1.95 [11-18 @ 17:20]  SCr 1.36 [11-17 @ 07:29]              Urinalysis - [11-15-22 @ 20:41]      Color Light Yellow / Appearance Clear / SG 1.007 / pH 6.5      Gluc Negative / Ketone Negative  / Bili Negative / Urobili Negative       Blood Negative / Protein 30 mg/dL / Leuk Est Negative / Nitrite Negative      RBC 0 / WBC 1 / Hyaline  / Gran  / Sq Epi  / Non Sq Epi 1 / Bacteria Negative      Iron 52, TIBC 216, %sat 24      [11-16-22 @ 04:19]  Ferritin 429      [11-16-22 @ 04:19]

## 2022-11-20 NOTE — PROGRESS NOTE ADULT - SUBJECTIVE AND OBJECTIVE BOX
Orthopedics     POD4 s/p 1-stage right revision TKA with antibiotic spacer    Patient seen and examined at bedside. Pain is overall controlled. Pt feeling well. No cp sob nausea or vomiting.  Patient had discharge screening COVID test that was positive, but subsequent two tests were negative so presumed false positive.    Vital Signs Last 24 Hrs  T(C): 36.9 (20 Nov 2022 05:00), Max: 37.2 (20 Nov 2022 00:50)  T(F): 98.4 (20 Nov 2022 05:00), Max: 99 (20 Nov 2022 00:50)  HR: 100 (20 Nov 2022 05:00) (85 - 108)  BP: 144/74 (20 Nov 2022 05:00) (127/60 - 156/71)  BP(mean): --  RR: 18 (20 Nov 2022 05:00) (18 - 18)  SpO2: 98% (20 Nov 2022 05:00) (96% - 98%)    Parameters below as of 20 Nov 2022 05:00  Patient On (Oxygen Delivery Method): room air                          8.7    14.75 )-----------( 234      ( 18 Nov 2022 17:20 )             27.8         Exam:  Gen: NAD, resting comfortably  Prevena Dressing c/d/i; Knee immobilizer in place  +EHL/FHL/TA/GS  SILT L2-S1  +DP/PT 2+  Calf NTTP b/l  Compartments soft and compressible    A/P:  77yFemale Stable POD 4  s/p Stage 1 right revision tka with antibiotic spacer    -Cardio management appreciated  -ID recs/management appreciated, continue Ancef  -FU AM labs  -ESR/CRP: 59/65  -20% TTWB RLE in Knee Immobilizer - NO RANGE OF MOTION!!  -Carlos Knee Brace Ordered  -Pain control PRN  -JEFFERSON drain discontinued this morning  -Bulky dressing underlying Knee Immobilizer discontinued.   -PT/OT  -FU OR Cx: MSSA  -DVT PE ppx: Eliquis  -Incentive spirometry  -FU BCx NGTD  -Dispo: [facility] per PT recs  -Will discuss with attending Dr. Reyes and advise if changes to plan

## 2022-11-20 NOTE — CHART NOTE - NSCHARTNOTEFT_GEN_A_CORE
Please GURU sosa
patient is npo  medically cleared  on cefazolin, not pcn allergic per ID  nephrology following, has CKD    to OR today for R TKA explantation, I&D, 1-stage revision TKA with robotic navigation asssitance versus antibiotic spacer placement (static or dynamic)  again I reviewed plan with patient. she understands that a 1-stage revision uses primary components which is considered OFF LABEL use of this technology.   she would prefer to avoid a 2-stage revision if at all possible she states but she does understand that if severe soft tissue or bone damage is noted then a spacer will be required    Alvarado Reyes M.D.  Attending Orthopedic Surgeon

## 2022-11-21 LAB
ALBUMIN SERPL ELPH-MCNC: 2.9 G/DL — LOW (ref 3.3–5)
ALP SERPL-CCNC: 58 U/L — SIGNIFICANT CHANGE UP (ref 40–120)
ALT FLD-CCNC: <5 U/L — LOW (ref 10–45)
ANION GAP SERPL CALC-SCNC: 7 MMOL/L — SIGNIFICANT CHANGE UP (ref 5–17)
AST SERPL-CCNC: 20 U/L — SIGNIFICANT CHANGE UP (ref 10–40)
BILIRUB SERPL-MCNC: 0.3 MG/DL — SIGNIFICANT CHANGE UP (ref 0.2–1.2)
BLD GP AB SCN SERPL QL: NEGATIVE — SIGNIFICANT CHANGE UP
BUN SERPL-MCNC: 21 MG/DL — SIGNIFICANT CHANGE UP (ref 7–23)
CALCIUM SERPL-MCNC: 9 MG/DL — SIGNIFICANT CHANGE UP (ref 8.4–10.5)
CHLORIDE SERPL-SCNC: 99 MMOL/L — SIGNIFICANT CHANGE UP (ref 96–108)
CO2 SERPL-SCNC: 27 MMOL/L — SIGNIFICANT CHANGE UP (ref 22–31)
CREAT SERPL-MCNC: 1.2 MG/DL — SIGNIFICANT CHANGE UP (ref 0.5–1.3)
EGFR: 46 ML/MIN/1.73M2 — LOW
GLUCOSE SERPL-MCNC: 161 MG/DL — HIGH (ref 70–99)
HCT VFR BLD CALC: 25.3 % — LOW (ref 34.5–45)
HGB BLD-MCNC: 8 G/DL — LOW (ref 11.5–15.5)
MCHC RBC-ENTMCNC: 28.3 PG — SIGNIFICANT CHANGE UP (ref 27–34)
MCHC RBC-ENTMCNC: 31.6 GM/DL — LOW (ref 32–36)
MCV RBC AUTO: 89.4 FL — SIGNIFICANT CHANGE UP (ref 80–100)
NRBC # BLD: 0 /100 WBCS — SIGNIFICANT CHANGE UP (ref 0–0)
PLATELET # BLD AUTO: 277 K/UL — SIGNIFICANT CHANGE UP (ref 150–400)
POTASSIUM SERPL-MCNC: 4.1 MMOL/L — SIGNIFICANT CHANGE UP (ref 3.5–5.3)
POTASSIUM SERPL-SCNC: 4.1 MMOL/L — SIGNIFICANT CHANGE UP (ref 3.5–5.3)
PROT SERPL-MCNC: 6.4 G/DL — SIGNIFICANT CHANGE UP (ref 6–8.3)
RBC # BLD: 2.83 M/UL — LOW (ref 3.8–5.2)
RBC # FLD: 15.8 % — HIGH (ref 10.3–14.5)
RH IG SCN BLD-IMP: POSITIVE — SIGNIFICANT CHANGE UP
SODIUM SERPL-SCNC: 133 MMOL/L — LOW (ref 135–145)
WBC # BLD: 11.04 K/UL — HIGH (ref 3.8–10.5)
WBC # FLD AUTO: 11.04 K/UL — HIGH (ref 3.8–10.5)

## 2022-11-21 PROCEDURE — 99232 SBSQ HOSP IP/OBS MODERATE 35: CPT

## 2022-11-21 RX ADMIN — Medication 975 MILLIGRAM(S): at 08:11

## 2022-11-21 RX ADMIN — OXYCODONE HYDROCHLORIDE 10 MILLIGRAM(S): 5 TABLET ORAL at 15:49

## 2022-11-21 RX ADMIN — PANTOPRAZOLE SODIUM 40 MILLIGRAM(S): 20 TABLET, DELAYED RELEASE ORAL at 05:26

## 2022-11-21 RX ADMIN — Medication 100 MILLIGRAM(S): at 00:38

## 2022-11-21 RX ADMIN — RALOXIFENE HYDROCHLORIDE 60 MILLIGRAM(S): 60 TABLET, COATED ORAL at 12:57

## 2022-11-21 RX ADMIN — OXYCODONE HYDROCHLORIDE 10 MILLIGRAM(S): 5 TABLET ORAL at 00:35

## 2022-11-21 RX ADMIN — OXYCODONE HYDROCHLORIDE 10 MILLIGRAM(S): 5 TABLET ORAL at 21:32

## 2022-11-21 RX ADMIN — POLYETHYLENE GLYCOL 3350 17 GRAM(S): 17 POWDER, FOR SOLUTION ORAL at 21:27

## 2022-11-21 RX ADMIN — APIXABAN 2.5 MILLIGRAM(S): 2.5 TABLET, FILM COATED ORAL at 05:26

## 2022-11-21 RX ADMIN — Medication 1 TABLET(S): at 13:09

## 2022-11-21 RX ADMIN — Medication 100 MILLIGRAM(S): at 05:26

## 2022-11-21 RX ADMIN — Medication 975 MILLIGRAM(S): at 00:36

## 2022-11-21 RX ADMIN — APIXABAN 2.5 MILLIGRAM(S): 2.5 TABLET, FILM COATED ORAL at 17:49

## 2022-11-21 RX ADMIN — Medication 1: at 12:10

## 2022-11-21 RX ADMIN — ATORVASTATIN CALCIUM 80 MILLIGRAM(S): 80 TABLET, FILM COATED ORAL at 21:27

## 2022-11-21 RX ADMIN — OXYCODONE HYDROCHLORIDE 10 MILLIGRAM(S): 5 TABLET ORAL at 06:54

## 2022-11-21 RX ADMIN — Medication 1: at 08:11

## 2022-11-21 RX ADMIN — SENNA PLUS 2 TABLET(S): 8.6 TABLET ORAL at 21:27

## 2022-11-21 RX ADMIN — CHLORHEXIDINE GLUCONATE 1 APPLICATION(S): 213 SOLUTION TOPICAL at 12:58

## 2022-11-21 RX ADMIN — Medication 100 MILLIGRAM(S): at 12:58

## 2022-11-21 RX ADMIN — OXYCODONE HYDROCHLORIDE 10 MILLIGRAM(S): 5 TABLET ORAL at 06:06

## 2022-11-21 RX ADMIN — OXYCODONE HYDROCHLORIDE 10 MILLIGRAM(S): 5 TABLET ORAL at 22:05

## 2022-11-21 RX ADMIN — Medication 975 MILLIGRAM(S): at 01:06

## 2022-11-21 RX ADMIN — Medication 975 MILLIGRAM(S): at 17:49

## 2022-11-21 NOTE — PROGRESS NOTE ADULT - ATTENDING COMMENTS
I agree with the above note and have personally seen and examined this patient. All pertinent films have been reviewed. Please refer to clinical documentation of the history, physical examinations, data summary, and both assessment and plan as documented above and with which I agree.    acute blood loss anemia, will transfuse 1u prbc and plan snf dc tomorrow    Alvarado Reyes MD  Attending Orthopedic Surgeon

## 2022-11-21 NOTE — PROGRESS NOTE ADULT - SUBJECTIVE AND OBJECTIVE BOX
Subjective: Patient seen and examined. No new events except as noted.     REVIEW OF SYSTEMS:    CONSTITUTIONAL: + weakness, fevers or chills  EYES/ENT: No visual changes;  No vertigo or throat pain   NECK: No pain or stiffness  RESPIRATORY: No cough, wheezing, hemoptysis; No shortness of breath  CARDIOVASCULAR: No chest pain or palpitations  GASTROINTESTINAL: No abdominal or epigastric pain. No nausea, vomiting, or hematemesis; No diarrhea or constipation. No melena or hematochezia.  GENITOURINARY: No dysuria, frequency or hematuria  NEUROLOGICAL: No numbness or weakness  SKIN: No itching, burning, rashes, or lesions   All other review of systems is negative unless indicated above.    MEDICATIONS:  MEDICATIONS  (STANDING):  acetaminophen     Tablet .. 975 milliGRAM(s) Oral every 8 hours  apixaban 2.5 milliGRAM(s) Oral two times a day  atorvastatin 80 milliGRAM(s) Oral at bedtime  ceFAZolin   IVPB 2000 milliGRAM(s) IV Intermittent every 12 hours  chlorhexidine 2% Cloths 1 Application(s) Topical daily  HYDROmorphone  Injectable 0.5 milliGRAM(s) IV Push once  influenza  Vaccine (HIGH DOSE) 0.7 milliLiter(s) IntraMuscular once  insulin lispro (ADMELOG) corrective regimen sliding scale   SubCutaneous three times a day before meals  insulin lispro (ADMELOG) corrective regimen sliding scale   SubCutaneous at bedtime  metoprolol succinate  milliGRAM(s) Oral daily  multivitamin 1 Tablet(s) Oral daily  pantoprazole    Tablet 40 milliGRAM(s) Oral before breakfast  polyethylene glycol 3350 17 Gram(s) Oral at bedtime  raloxifene 60 milliGRAM(s) Oral daily  senna 2 Tablet(s) Oral at bedtime  sodium chloride 0.9%. 1000 milliLiter(s) (75 mL/Hr) IV Continuous <Continuous>    PHYSICAL EXAM:  Vital Signs Last 24 Hrs  T(C): 36.7 (21 Nov 2022 06:36), Max: 37.1 (20 Nov 2022 09:10)  T(F): 98.1 (21 Nov 2022 06:36), Max: 98.7 (20 Nov 2022 09:10)  HR: 69 (21 Nov 2022 06:36) (56 - 97)  BP: 124/74 (21 Nov 2022 06:36) (104/53 - 149/73)  BP(mean): --  RR: 18 (21 Nov 2022 06:36) (18 - 18)  SpO2: 98% (21 Nov 2022 06:36) (97% - 100%)    Parameters below as of 21 Nov 2022 06:36  Patient On (Oxygen Delivery Method): room air    I&O's Summary    20 Nov 2022 07:01  -  21 Nov 2022 07:00  --------------------------------------------------------  IN: 1290 mL / OUT: 3280 mL / NET: -1990 mL    Appearance: NAD	  HEENT: Normal oral mucosa, PERRL, EOMI	  Lymphatic: No lymphadenopathy , no edema  Cardiovascular: Normal S1 S2, No JVD, No murmurs , Peripheral pulses palpable 2+ bilaterally  Respiratory: Lungs clear to auscultation, normal effort 	  Gastrointestinal:  Soft, Non-tender, + BS	  Skin: No rashes, No ecchymoses, No cyanosis, warm to touch - LLE: BJKI; dressing c/d/i; JEFFERSON drain draining serosang fluid, knee mobilizer on - RUE PICC  Musculoskeletal: Normal range of motion, normal strength  Psychiatry:  Mood & affect appropriate    LABS:    CARDIAC MARKERS:                        8.0    11.04 )-----------( 277      ( 21 Nov 2022 07:24 )             25.3     11-21    133<L>  |  99  |  21  ----------------------------<  161<H>  4.1   |  27  |  1.20    Ca    9.0      21 Nov 2022 07:15    TPro  6.4  /  Alb  2.9<L>  /  TBili  0.3  /  DBili  x   /  AST  20  /  ALT  <5<L>  /  AlkPhos  58  11-21    proBNP:   Lipid Profile:   HgA1c:   TSH:     TELEMETRY: 	    ECG:  	  RADIOLOGY:   DIAGNOSTIC TESTING:  [ ] Echocardiogram:  [ ]  Catheterization:  [ ] Stress Test:    OTHER:

## 2022-11-21 NOTE — PROGRESS NOTE ADULT - SUBJECTIVE AND OBJECTIVE BOX
Orthopedics     POD5 s/p stage 1 right revision TKA with antibiotic spacer    Patient seen and examined at bedside. Pain is overall controlled. Pt feeling well. No cp sob nausea or vomiting. Patient received her Carlos Knee Brace yesterday to be locked in extension    Vital Signs Last 24 Hrs  T(C): 36.7 (21 Nov 2022 06:36), Max: 37.1 (20 Nov 2022 09:10)  T(F): 98.1 (21 Nov 2022 06:36), Max: 98.7 (20 Nov 2022 09:10)  HR: 69 (21 Nov 2022 06:36) (56 - 97)  BP: 124/74 (21 Nov 2022 06:36) (104/53 - 149/73)  BP(mean): --  RR: 18 (21 Nov 2022 06:36) (18 - 18)  SpO2: 98% (21 Nov 2022 06:36) (97% - 100%)    Parameters below as of 21 Nov 2022 06:36  Patient On (Oxygen Delivery Method): room air            Exam:  Gen: NAD, resting comfortably  Prevena Dressing c/d/i; [place] Knee Brace Locked in Extension  +EHL/FHL/TA/GS  SILT L2-S1  +DP/PT 2+  Calf NTTP b/l  Compartments soft and compressible    A/P:  77yFemale Stable POD 5  s/p Stage 1 right revision tka with antibiotic spacer    -Cardio management appreciated  -ID recs/management appreciated, continue Ancef  -FU AM labs  -ESR/CRP: 59/65  -20% TTWB RLE in [place] Knee Brace - NO RANGE OF MOTION!! Locked in Extension!  -Pain control PRN  -PT/OT  -FU OR Cx: MSSA  -DVT PE ppx: Eliquis  -Incentive spirometry  -Dispo: [facility] per PT recs, possibly today  -Will discuss with attending Dr. Reyes and advise if changes to plan

## 2022-11-21 NOTE — PROGRESS NOTE ADULT - SUBJECTIVE AND OBJECTIVE BOX
CC: F/U for PJI    Saw/spoke to patient. No fevers, no chills. No new complaints.    Allergies  penicillins (Short breath (Severe))    ANTIMICROBIALS:  ceFAZolin   IVPB 2000 every 12 hours    PE:    Vital Signs Last 24 Hrs  T(C): 36.5 (21 Nov 2022 09:21), Max: 36.9 (20 Nov 2022 21:37)  T(F): 97.7 (21 Nov 2022 09:21), Max: 98.4 (20 Nov 2022 21:37)  HR: 58 (21 Nov 2022 09:32) (58 - 88)  BP: 133/59 (21 Nov 2022 09:32) (119/64 - 149/73)  RR: 18 (21 Nov 2022 09:21) (18 - 18)  SpO2: 99% (21 Nov 2022 09:32) (98% - 99%)    Gen: AOx3, NAD, non-toxic  CV: Nontachycardic  Resp: Breathing comfortably, RA  Abd: Soft, nontender  IV/Skin: No thrombophlebitis    LABS:                        8.0    11.04 )-----------( 277      ( 21 Nov 2022 07:24 )             25.3     11-21    133<L>  |  99  |  21  ----------------------------<  161<H>  4.1   |  27  |  1.20    Ca    9.0      21 Nov 2022 07:15    TPro  6.4  /  Alb  2.9<L>  /  TBili  0.3  /  DBili  x   /  AST  20  /  ALT  <5<L>  /  AlkPhos  58  11-21    MICROBIOLOGY:    .Tissue Other  11-16-22   Culture is being performed.  --  Staphylococcus aureus    .Blood Blood-Peripheral  11-14-22   No Growth Final  --  --    .Blood Blood-Peripheral  11-14-22   No Growth Final  --  --    (otherwise reviewed)    RADIOLOGY:    11/18 XR:    FINDINGS:  The cardiac silhouette is normal in size. There is a   right-sided PICC line with tip in the superior vena cava. No pneumothorax   is seen. There are no focal consolidations or pleural effusions. The   hilar and mediastinal structures appear unremarkable. The osseous   structures are intact.    IMPRESSION: Right-sided PICC line in good position with no pneumothorax   seen

## 2022-11-22 ENCOUNTER — TRANSCRIPTION ENCOUNTER (OUTPATIENT)
Age: 77
End: 2022-11-22

## 2022-11-22 VITALS
RESPIRATION RATE: 18 BRPM | HEART RATE: 60 BPM | DIASTOLIC BLOOD PRESSURE: 70 MMHG | OXYGEN SATURATION: 98 % | SYSTOLIC BLOOD PRESSURE: 152 MMHG | TEMPERATURE: 99 F

## 2022-11-22 LAB
ANION GAP SERPL CALC-SCNC: 9 MMOL/L — SIGNIFICANT CHANGE UP (ref 5–17)
BUN SERPL-MCNC: 22 MG/DL — SIGNIFICANT CHANGE UP (ref 7–23)
CALCIUM SERPL-MCNC: 9 MG/DL — SIGNIFICANT CHANGE UP (ref 8.4–10.5)
CHLORIDE SERPL-SCNC: 98 MMOL/L — SIGNIFICANT CHANGE UP (ref 96–108)
CO2 SERPL-SCNC: 28 MMOL/L — SIGNIFICANT CHANGE UP (ref 22–31)
CREAT SERPL-MCNC: 0.95 MG/DL — SIGNIFICANT CHANGE UP (ref 0.5–1.3)
EGFR: 61 ML/MIN/1.73M2 — SIGNIFICANT CHANGE UP
GLUCOSE SERPL-MCNC: 233 MG/DL — HIGH (ref 70–99)
HCT VFR BLD CALC: 29.2 % — LOW (ref 34.5–45)
HGB BLD-MCNC: 9.5 G/DL — LOW (ref 11.5–15.5)
MCHC RBC-ENTMCNC: 28.6 PG — SIGNIFICANT CHANGE UP (ref 27–34)
MCHC RBC-ENTMCNC: 32.5 GM/DL — SIGNIFICANT CHANGE UP (ref 32–36)
MCV RBC AUTO: 88 FL — SIGNIFICANT CHANGE UP (ref 80–100)
NRBC # BLD: 0 /100 WBCS — SIGNIFICANT CHANGE UP (ref 0–0)
PLATELET # BLD AUTO: 303 K/UL — SIGNIFICANT CHANGE UP (ref 150–400)
POTASSIUM SERPL-MCNC: 4.1 MMOL/L — SIGNIFICANT CHANGE UP (ref 3.5–5.3)
POTASSIUM SERPL-SCNC: 4.1 MMOL/L — SIGNIFICANT CHANGE UP (ref 3.5–5.3)
RBC # BLD: 3.32 M/UL — LOW (ref 3.8–5.2)
RBC # FLD: 15 % — HIGH (ref 10.3–14.5)
SODIUM SERPL-SCNC: 135 MMOL/L — SIGNIFICANT CHANGE UP (ref 135–145)
WBC # BLD: 9.77 K/UL — SIGNIFICANT CHANGE UP (ref 3.8–10.5)
WBC # FLD AUTO: 9.77 K/UL — SIGNIFICANT CHANGE UP (ref 3.8–10.5)

## 2022-11-22 PROCEDURE — 99232 SBSQ HOSP IP/OBS MODERATE 35: CPT

## 2022-11-22 RX ORDER — LANOLIN ALCOHOL/MO/W.PET/CERES
1 CREAM (GRAM) TOPICAL
Qty: 0 | Refills: 0 | DISCHARGE
Start: 2022-11-22

## 2022-11-22 RX ORDER — CEFAZOLIN SODIUM 1 G
2000 VIAL (EA) INJECTION
Qty: 0 | Refills: 0 | DISCHARGE
Start: 2022-11-22

## 2022-11-22 RX ORDER — APIXABAN 2.5 MG/1
1 TABLET, FILM COATED ORAL
Qty: 0 | Refills: 0 | DISCHARGE
Start: 2022-11-22

## 2022-11-22 RX ORDER — ATORVASTATIN CALCIUM 80 MG/1
1 TABLET, FILM COATED ORAL
Qty: 0 | Refills: 0 | DISCHARGE
Start: 2022-11-22

## 2022-11-22 RX ADMIN — Medication 1 TABLET(S): at 11:06

## 2022-11-22 RX ADMIN — Medication 975 MILLIGRAM(S): at 01:09

## 2022-11-22 RX ADMIN — Medication 975 MILLIGRAM(S): at 09:53

## 2022-11-22 RX ADMIN — Medication 100 MILLIGRAM(S): at 11:11

## 2022-11-22 RX ADMIN — PANTOPRAZOLE SODIUM 40 MILLIGRAM(S): 20 TABLET, DELAYED RELEASE ORAL at 05:39

## 2022-11-22 RX ADMIN — OXYCODONE HYDROCHLORIDE 10 MILLIGRAM(S): 5 TABLET ORAL at 11:13

## 2022-11-22 RX ADMIN — CHLORHEXIDINE GLUCONATE 1 APPLICATION(S): 213 SOLUTION TOPICAL at 11:19

## 2022-11-22 RX ADMIN — APIXABAN 2.5 MILLIGRAM(S): 2.5 TABLET, FILM COATED ORAL at 05:39

## 2022-11-22 RX ADMIN — Medication 100 MILLIGRAM(S): at 05:39

## 2022-11-22 RX ADMIN — OXYCODONE HYDROCHLORIDE 10 MILLIGRAM(S): 5 TABLET ORAL at 11:43

## 2022-11-22 RX ADMIN — Medication 100 MILLIGRAM(S): at 01:10

## 2022-11-22 RX ADMIN — Medication 975 MILLIGRAM(S): at 09:23

## 2022-11-22 RX ADMIN — Medication 975 MILLIGRAM(S): at 01:39

## 2022-11-22 RX ADMIN — RALOXIFENE HYDROCHLORIDE 60 MILLIGRAM(S): 60 TABLET, COATED ORAL at 11:49

## 2022-11-22 RX ADMIN — Medication 1: at 11:59

## 2022-11-22 NOTE — PROGRESS NOTE ADULT - ASSESSMENT
77 year old Female with PMHx HTN managed on telmisartan, Pre-diabetes, R TKA by Dr. Herring on 4/8/22 at [facility] who presents for evaluation of right knee swelling and severe pain, which has been worsening. She was instructed to present to the ED for evaluation with Dr. 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 Her initial postoperative period was complicated by wound break down that was treated with Bactrim after cultures were positive for MSSA. Chart review revealed treatment with IV vanco via PICC line. complicated by young 1.98 on 10/3, elevated vanco trough, and body rash. Vanco stopped 10/9 with improving cr 1.5 on 10/17. Patient states she was restarted on meloxicam and bactrim on 10/29 and has been taking up until admission. Noticed with creatinine 2.1 g/dL and hyperkalemia. Renal consult called.      1- YOUNG on CKD III  2- hyperkalemia  3- HTN  4- R knee PJI MSSA      suspect baseline CKD III  and cr improving   hold NSAIDs  hold losartan  hyperkalemia, k is better   renal sono without obstruction  low k diet  continue BB  add norvasc 10 mg daily if needed for bp control   trend bp  strict I/O  ancef as per ID recommendations 
78 yo F HTN, DM2, HLD, R knee arthroplasty ~ 7 months ago, presenting to hospital with R knee PJI  2-3 months ago there was concern for R sided PJI, and patient was given a prolonged course of Daptomycin  She had an allergic reaction to Vanco (rash)  Also gives history of a remote PCN allergy--that was life threatening with swelling  After course of Dapto, patient still had ongoing symptoms so arthrocentesis was repeated which showed MSSA growth and 20,900 cells  Planned for explant of hardware on this admission  Overall, PJI, MSSA, R knee pain  - Cefazolin 1g q 12 (monitor CrCl)  - F/U BCXs  - F/U Ortho eval--OR planning  - Anticipate will need prolonged course IV abx    Venu Potter MD  Contact on TEAMS messaging from 9am - 5pm  From 5pm-9am, on weekends, or if no response call [phone]
78 yo F HTN, DM2, HLD, R knee arthroplasty ~ 7 months ago, presenting to hospital with R knee PJI  2-3 months ago there was concern for R sided PJI, and patient was given a prolonged course of Daptomycin  She had an allergic reaction to Vanco (rash)  Also gives history of a remote PCN allergy--that was life threatening with swelling  After course of Dapto, patient still had ongoing symptoms so arthrocentesis was repeated which showed MSSA growth and 20,900 cells  S/p OR 11/16 explant hardware  Overall, PJI, MSSA, R knee pain  - Cefazolin 2g q 12 (monitor CrCl), 6 week course from OR  - F/U BCXs  - F/U Ortho  - F/U OR cultures  - Okay for PICC as long as BCXs remain negative and no new signs of sepsis/fever/infection in the interim; facility MD to monitor while on abx, recommend monitor CBC/CMP weekly    Venu Potter MD  Contact on TEAMS messaging from 9am - 5pm  From 5pm-9am, on weekends, or if no response call [phone]
76 yo F HTN, DM2, HLD, R knee arthroplasty ~ 7 months ago, presenting to hospital with R knee PJI  2-3 months ago there was concern for R sided PJI, and patient was given a prolonged course of Daptomycin  She had an allergic reaction to Vanco (rash)  Also gives history of a remote PCN allergy--that was life threatening with swelling  After course of Dapto, patient still had ongoing symptoms so arthrocentesis was repeated which showed MSSA growth and 20,900 cells  S/p OR 11/16 explant hardware  Overall, PJI, MSSA, R knee pain  - Cefazolin 2g q 12 (monitor CrCl)--would decrease dose (CrCl 31)  - Anticipate will need prolonged course IV cefazolin (6 weeks)  - F/U BCXs  - F/U Ortho  - F/U OR cultures    Venu Potter MD  Contact on TEAMS messaging from 9am - 5pm  From 5pm-9am, on weekends, or if no response call [phone]
77 year old Female with PMHx HTN managed on telmisartan, Pre-diabetes, R TKA by Dr. Herring on 4/8/22 at [facility] who presents for evaluation of right knee swelling and severe pain, which has been worsening. She was instructed to present to the ED for evaluation with Dr. 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 Her initial postoperative period was complicated by wound break down that was treated with Bactrim after cultures were positive for MSSA. Chart review revealed treatment with IV vanco via PICC line. complicated by young 1.98 on 10/3, elevated vanco trough, and body rash. Vanco stopped 10/9 with improving cr 1.5 on 10/17. Patient states she was restarted on meloxicam and bactrim on 10/29 and has been taking up until admission. Noticed with creatinine 2.1 g/dL and hyperkalemia. Renal consult called.      1- YOUNG on CKD III  2- hyperkalemia  3- HTN  4- R knee PJI MSSA    cr improving to baseline   k in range  hold losartan  Continue BB  add norvasc  if needed for bp control   trend bp  strict I/O  ancef as per ID recommendations 
77 year old Female with PMHx HTN managed on telmisartan, Pre-diabetes, R TKA by Dr. Herring on 4/8/22 at [facility] who presents for evaluation of right knee swelling and severe pain, which has been worsening. She was instructed to present to the ED for evaluation with Dr. 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 Her initial postoperative period was complicated by wound break down that was treated with Bactrim after cultures were positive for MSSA. Chart review revealed treatment with IV vanco via PICC line. complicated by young 1.98 on 10/3, elevated vanco trough, and body rash. Vanco stopped 10/9 with improving cr 1.5 on 10/17. Patient states she was restarted on meloxicam and bactrim on 10/29 and has been taking up until admission. Noticed with creatinine 2.1 g/dL and hyperkalemia. Renal consult called.      1- YOUNG on CKD III  2- hyperkalemia  3- HTN  4- R knee PJI MSSA      suspect baseline CKD III given GFR 51 in 3/2022 and cr has ranged from 1.4 to 1.6 mostly except one cr upto 1.9   young in setting of bactrim and NSAID use  hold bactrim and NSAIDs  hold losartan  hyperkalemia, k is better   renal sono without obstruction  low k diet  continue BB  add norvasc 10 mg daily  trend bp  strict I/O  plan for OR for knee infection which has been refractory to abx , she will be at risk for worsening renal function with OR, this was explained to patient, and she verbalized understanding today as well   in addition has hyponatremia trend na for now  ancef as per ID recommendations   
77 year old Female with PMHx HTN managed on telmisartan, Pre-diabetes, R TKA by Dr. Herring on 4/8/22 at [facility] who presents for evaluation of right knee swelling and severe pain, which has been worsening. She was instructed to present to the ED for evaluation with Dr. 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 Her initial postoperative period was complicated by wound break down that was treated with Bactrim after cultures were positive for MSSA. Chart review revealed treatment with IV vanco via PICC line. complicated by young 1.98 on 10/3, elevated vanco trough, and body rash. Vanco stopped 10/9 with improving cr 1.5 on 10/17. Patient states she was restarted on meloxicam and bactrim on 10/29 and has been taking up until admission. Noticed with creatinine 2.1 g/dL and hyperkalemia. Renal consult called.      1- YOUNG on CKD III  2- hyperkalemia  3- HTN  4- R knee PJI MSSA    cr improving   k in range  hold losartan  k is better after lokelma   Continue BB  add norvasc  if needed for bp control   trend bp  strict I/O  acidosis check lactic acid in am as well   trend bicarb  ancef as per ID recommendations 
77F hx R TKA by Dr. Herring on 4/8/22 at [facility] who presents for evaluation of right knee swelling and severe pain, which has been worsening.
A/P: 77y Female with right knee PJI MSSA    - Cardiology risk assessment appreciated  - ID recommendations appreciated  - Ancef Q12 hours due to creatinine clearance until OR  - FU AM labs  - FU BCx - NGTD  - FU UCx - pending  - WBAT  - Pain control PRN  - DVT PE ppx: SQH; Hold DVT ppx for OR tomorrow; SCDs okay  - NPO except medications for OR; IVF while NPO  - Plan for OR today with Dr. Reyes for explantation of R TKA, I&D, 1-stage revision versus antibiotic spacer placement  - Will discuss with attending Dr. Reyes and advise if any changes to plan
77 year old Female with PMHx HTN managed on telmisartan, Pre-diabetes, R TKA by Dr. Herring on 4/8/22 at [facility] who presents for evaluation of right knee swelling and severe pain, which has been worsening. She was instructed to present to the ED for evaluation with Dr. 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 Her initial postoperative period was complicated by wound break down that was treated with Bactrim after cultures were positive for MSSA. Chart review revealed treatment with IV vanco via PICC line. complicated by young 1.98 on 10/3, elevated vanco trough, and body rash. Vanco stopped 10/9 with improving cr 1.5 on 10/17. Patient states she was restarted on meloxicam and bactrim on 10/29 and has been taking up until admission. Noticed with creatinine 2.1 g/dL and hyperkalemia. Renal consult called.      1- YOUNG on CKD III  2- hyperkalemia  3- HTN  4- R knee PJI MSSA    cr worsening again   hold NSAIDs  hold losartan  hyperkalemia k is worse again. lokelma 10 g po stat.   CPK . repeat k. ekg. low k diet   low k diet  continue BB  add norvasc 10 mg daily if needed for bp control   trend bp  strict I/O  ancef as per ID recommendations   d.w ortho team
78 yo F HTN, DM2, HLD, R knee arthroplasty ~ 7 months ago, presenting to hospital with R knee PJI  2-3 months ago there was concern for R sided PJI, and patient was given a prolonged course of Daptomycin  She had an allergic reaction to Vanco (rash)  Also gives history of a remote PCN allergy--that was life threatening with swelling  After course of Dapto, patient still had ongoing symptoms so arthrocentesis was repeated which showed MSSA growth and 20,900 cells  Planned for explant of hardware on this admission  Overall, PJI, MSSA, R knee pain  - Cefazolin 1g q 12  - F/U BCXs  - F/U Ortho eval--OR planning    Venu Potter MD  Contact on TEAMS messaging from 9am - 5pm  From 5pm-9am, on weekends, or if no response call [phone]
78 yo F HTN, DM2, HLD, R knee arthroplasty ~ 7 months ago, presenting to hospital with R knee PJI  2-3 months ago there was concern for R sided PJI, and patient was given a prolonged course of Daptomycin  She had an allergic reaction to Vanco (rash)  Also gives history of a remote PCN allergy--that was life threatening with swelling  After course of Dapto, patient still had ongoing symptoms so arthrocentesis was repeated which showed MSSA growth and 20,900 cells  S/p OR 11/16 explant hardware  Overall, PJI, MSSA, R knee pain  - Cefazolin 2g q 12 (monitor CrCl), 6 week course from OR  - F/U BCXs  - F/U Ortho  - F/U OR cultures  - Okay for PICC as long as BCXs remain negative and no new signs of sepsis/fever/infection in the interim; facility MD to monitor while on abx, recommend monitor CBC/CMP weekly    Venu Potter MD  Contact on TEAMS messaging from 9am - 5pm  From 5pm-9am, on weekends, or if no response call [phone]
78 yo F HTN, DM2, HLD, R knee arthroplasty ~ 7 months ago, presenting to hospital with R knee PJI  2-3 months ago there was concern for R sided PJI, and patient was given a prolonged course of Daptomycin  She had an allergic reaction to Vanco (rash)  Also gives history of a remote PCN allergy--that was life threatening with swelling  After course of Dapto, patient still had ongoing symptoms so arthrocentesis was repeated which showed MSSA growth and 20,900 cells  S/p OR 11/16 explant hardware  Overall, PJI, MSSA, R knee pain  - Cefazolin 2g q 12 (monitor CrCl), 6 week course from OR  - F/U BCXs  - F/U Ortho  - F/U OR cultures  - Okay for PICC as long as BCXs remain negative and no new signs of sepsis/fever/infection in the interim; facility MD to monitor while on abx, recommend monitor CBC/CMP weekly  - Follow up with Dr. Holley (ID) in 6 weeks, [phone]    Signing off. Please call with further questions or change in status.    Venu Potter MD  Contact on TEAMS messaging from 9am - 5pm  From 5pm-9am, on weekends, or if no response call [phone]
77F hx R TKA by Dr. Herring on 4/8/22 at [facility] who presents for evaluation of right knee swelling and severe pain, which has been worsening.
77F hx R TKA by Dr. Herring on 4/8/22 at [facility] who presents for evaluation of right knee swelling and severe pain, which has been worsening.
77F hx R TKA by Dr. Herring on 4/8/22 at [facility] who presents for evaluation of right knee swelling and severe pain, which has been worsening.
77F hx R TKA by Dr. Herring on 4/8/22 at [facility] who presents for evaluation of right knee swelling and severe pain, which has been worsening.
77F hx R TKA by Dr. Herring on 4/8/22 at [facility] who presents for evaluation of right knee swelling and severe pain, which has been worsening.
77F hx R TKA by Dr. Herring on 4/8/22 at [facility] who presents for evaluation of right knee swelling and severe pain, which has been worsening.

## 2022-11-22 NOTE — PROGRESS NOTE ADULT - PROBLEM SELECTOR PLAN 4
with suspected CKD component     - hyperkalemia in setting of losartan, now d/c'ed again this am  IMPROVING  nephrology following
with suspected CKD component     - hyperkalemia in setting of losartan, now d/c'ed again this am  IMPROVING  nephrology following
with suspected CKD component     - hyperkalemia in setting of losartan, now d/c'ed again this am  gentle IVF  nephrology following
with suspected CKD component     - hyperkalemia in setting of losartan, now d/c'ed again this am  IMPROVING  nephrology following
with suspected CKD component     - hyperkalemia in setting of losartan, now d/c'ed   gentle IVF  nephrology following
with suspected CKD component     - hyperkalemia in setting of losartan, now d/c'ed again this am  IMPROVING  nephrology following
with suspected CKD component     - hyperkalemia in setting of losartan, now d/c'ed again this am  IMPROVING  nephrology following

## 2022-11-22 NOTE — DISCHARGE NOTE PROVIDER - HOSPITAL COURSE
76 y/o Male presents to [facility] for right knee swelling and severe pain. Patient was s/p R TKA w Dr. Herring on 4/8/22, presented to ED for eval with Dr. 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 Pt underwent explantation, I&D, 1 stage revision TKA w abx spacer 11/16. Patient tolerated the procedure well without any intraoperative complications. Pt had worsening Cr for which renal was consulted and which NSAIDs, losartan, and which lokelma was given and eventually Cr normalized. PICC line was placed for long term abx. Patient tolerated physical therapy well, and the pain was controlled. Pt is 20% weight bearing in RLE in yajaira locked in extension. Seen by medical attending for continuity of care and management and cleared for safe discharge. Keep dressing/incision clean, dry and intact. Pt is on Tamika 2.5mg BID for DVT ppx. Please follow up with Dr. Reyes in 1 week on discharge. Please follow up with your PMD for continuity of care and management as medications may have changed.   76 y/o Male presents to [facility] for right knee swelling and severe pain. Patient was s/p R TKA w Dr. Herring on 4/8/22, presented to ED for eval with Dr. 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 Pt underwent explantation, I&D, 1 stage revision TKA w abx spacer 11/16. Patient tolerated the procedure well without any intraoperative complications. Pt had worsening Cr for which renal was consulted and which NSAIDs, losartan, and which lokelma was given and eventually Cr normalized. PICC line was placed for long term abx with ancef!! Patient tolerated physical therapy well, and the pain was controlled. Pt is 20% weight bearing in RLE in yajaira locked in extension. Seen by medical attending for continuity of care and management and cleared for safe discharge. Keep dressing/incision clean, dry and intact. Pt is on Tamika 2.5mg BID for DVT ppx. Please follow up with Dr. Reyes in 1 week on discharge. Please follow up with your PMD for continuity of care and management as medications may have changed.

## 2022-11-22 NOTE — PROGRESS NOTE ADULT - PROBLEM SELECTOR PLAN 1
hx of R TKA 04/08/2022    - now with worsening knee pain   s/p revision of femoral and entire tibial component of total knee arthroplasty POD 1   pain management  management as per ortho
hx of R TKA 04/08/2022    - now with worsening knee pain   Plan for OR today    - RCRI Class II - METS > 4 and no anginal symptoms, no SOB - acceptable cardiac risk to proceed.    - ECHO reviewed - normal LVEF, no significant VHD  pain management  management as per ortho
hx of R TKA 04/08/2022    - now with worsening knee pain   s/p revision of femoral and entire tibial component of total knee arthroplasty POD 1   pain management  management as per ortho

## 2022-11-22 NOTE — DISCHARGE NOTE PROVIDER - NSDCMRMEDTOKEN_GEN_ALL_CORE_FT
apixaban 2.5 mg oral tablet: 1 tab(s) orally 2 times a day  aspirin 325 mg oral delayed release tablet: 1 tab(s) orally 2 times a day MDD:2  atorvastatin 80 mg oral tablet: 1 tab(s) orally once a day (at bedtime)  Carlos Knee Brace: Franklin Knee Brace  Locked in Extension  Right Knee  Dx: Inability to Ambulate  ICD10: R26.2  Calcium 600+D oral tablet: 1 tab(s) orally 2 times a day  Januvia 100 mg oral tablet: 1 tab(s) orally once a day  melatonin 3 mg oral tablet: 1 tab(s) orally once a day (at bedtime), As needed, Insomnia  Metoprolol Succinate ER 50 mg oral tablet, extended release: 1 tab(s) orally once a day  raloxifene 60 mg oral tablet: 1 tab(s) orally once a day  rosuvastatin 20 mg oral tablet: 1 tab(s) orally once a day  telmisartan 80 mg oral tablet: 1 tab(s) orally once a day

## 2022-11-22 NOTE — DISCHARGE NOTE PROVIDER - NSDCFUSCHEDAPPT_GEN_ALL_CORE_FT
John Holleytttuparail  [facility]  INFDISEASE [address]  Scheduled Appointment: 12/01/2022    [facility]  Scheduled Appointment: 12/06/2022    Alvarado Reyes  [facility] PreAdmits  Scheduled Appointment: 12/09/2022

## 2022-11-22 NOTE — PROGRESS NOTE ADULT - PROVIDER SPECIALTY LIST ADULT
Infectious Disease
Infectious Disease
Nephrology
Orthopedics
Cardiology
Cardiology
Infectious Disease
Infectious Disease
Nephrology
Orthopedics
Infectious Disease
Infectious Disease
Nephrology
Cardiology

## 2022-11-22 NOTE — PROGRESS NOTE ADULT - SUBJECTIVE AND OBJECTIVE BOX
Orthopedics     POD6 s/p stage 1 right revision TKA with antibiotic spacer    Patient seen and examined at bedside. Pain is overall controlled. Pt feeling well. No cp sob nausea or vomiting. Plan for discharge to [facility] today    Vital Signs Last 24 Hrs  T(C): 36.9 (22 Nov 2022 08:46), Max: 37.4 (22 Nov 2022 00:35)  T(F): 98.4 (22 Nov 2022 08:46), Max: 99.4 (22 Nov 2022 00:35)  HR: 57 (22 Nov 2022 08:46) (57 - 80)  BP: 134/67 (22 Nov 2022 08:46) (122/64 - 152/78)  BP(mean): --  RR: 18 (22 Nov 2022 08:46) (18 - 18)  SpO2: 100% (22 Nov 2022 08:46) (96% - 100%)    Parameters below as of 22 Nov 2022 08:46  Patient On (Oxygen Delivery Method): room air                          8.0    11.04 )-----------( 277      ( 21 Nov 2022 07:24 )             25.3           Exam:  Gen: NAD, resting comfortably  Prevena Dressing c/d/i; Carlos Knee Brace Locked in Extension  +EHL/FHL/TA/GS  SILT L2-S1  +DP/PT 2+  Calf NTTP b/l  Compartments soft and compressible    A/P:  77yFemale Stable POD 6  s/p Stage 1 right revision tka with antibiotic spacer    -Cardio management appreciated  -ID recs/management appreciated, continue Ancef  -FU AM labs; s/p 1U pRBC yesterday  -ESR/CRP: 59/65  -20% TTWB RLE in Carver Knee Brace - NO RANGE OF MOTION!! Locked in Extension!  -Pain control PRN  -PT/OT  -FU OR Cx: MSSA  -DVT PE ppx: Eliquis  -Incentive spirometry  -Dispo: [facility] per PT recs, plan for today to [facility]  -Will discuss with attending Dr. Reyes and advise if changes to plan

## 2022-11-22 NOTE — PROGRESS NOTE ADULT - SUBJECTIVE AND OBJECTIVE BOX
CC: F/U for PJI    Saw/spoke to patient. No fevers, no chills. No new complaints.    Allergies  penicillins (Short breath (Severe))    ANTIMICROBIALS:  ceFAZolin   IVPB 2000 every 12 hours    PE:    Vital Signs Last 24 Hrs  T(C): 37 (22 Nov 2022 12:14), Max: 37.4 (22 Nov 2022 00:35)  T(F): 98.6 (22 Nov 2022 12:14), Max: 99.4 (22 Nov 2022 00:35)  HR: 60 (22 Nov 2022 12:14) (57 - 80)  BP: 152/70 (22 Nov 2022 12:14) (122/64 - 152/78)  RR: 18 (22 Nov 2022 12:14) (18 - 18)  SpO2: 98% (22 Nov 2022 12:14) (96% - 100%)    Gen: AOx3, NAD, non-toxic  CV: Nontachycardic  Resp: Breathing comfortably, RA  Abd: Soft, nontender  IV/Skin: No thrombophlebitis    LABS:                        9.5    9.77  )-----------( 303      ( 22 Nov 2022 10:16 )             29.2     11-22    135  |  98  |  22  ----------------------------<  233<H>  4.1   |  28  |  0.95    Ca    9.0      22 Nov 2022 10:16    TPro  6.4  /  Alb  2.9<L>  /  TBili  0.3  /  DBili  x   /  AST  20  /  ALT  <5<L>  /  AlkPhos  58  11-21    MICROBIOLOGY:    .Tissue Other  11-16-22   Culture is being performed.  --  Staphylococcus aureus    .Blood Blood-Peripheral  11-14-22   No Growth Final  --  --    .Blood Blood-Peripheral  11-14-22   No Growth Final  --  --    RADIOLOGY:    11/18 XR:    FINDINGS:  The cardiac silhouette is normal in size. There is a   right-sided PICC line with tip in the superior vena cava. No pneumothorax   is seen. There are no focal consolidations or pleural effusions. The   hilar and mediastinal structures appear unremarkable. The osseous   structures are intact.    IMPRESSION: Right-sided PICC line in good position with no pneumothorax   seen

## 2022-11-22 NOTE — DISCHARGE NOTE NURSING/CASE MANAGEMENT/SOCIAL WORK - PATIENT PORTAL LINK FT
You can access the FollowMyHealth Patient Portal offered by [facility] by registering at the following website: [email or web address]. By joining [facility]’s FollowMyHealth portal, you will also be able to view your health information using other applications (apps) compatible with our system.

## 2022-11-22 NOTE — PROGRESS NOTE ADULT - PROBLEM SELECTOR PLAN 5
unclear if new or old     - has been told in past by primary she is anemic, takes iron pills at home   hemodynamically stable

## 2022-11-22 NOTE — PROGRESS NOTE ADULT - PROBLEM SELECTOR PROBLEM 4
YOUNG (acute kidney injury)

## 2022-11-22 NOTE — PROGRESS NOTE ADULT - SUBJECTIVE AND OBJECTIVE BOX
Subjective: Patient seen and examined. No new events except as noted.   Sleeping comfortably.     REVIEW OF SYSTEMS:    CONSTITUTIONAL: + weakness, fevers or chills  EYES/ENT: No visual changes;  No vertigo or throat pain   NECK: No pain or stiffness  RESPIRATORY: No cough, wheezing, hemoptysis; No shortness of breath  CARDIOVASCULAR: No chest pain or palpitations  GASTROINTESTINAL: No abdominal or epigastric pain. No nausea, vomiting, or hematemesis; No diarrhea or constipation. No melena or hematochezia.  GENITOURINARY: No dysuria, frequency or hematuria  NEUROLOGICAL: No numbness or weakness  SKIN: No itching, burning, rashes, or lesions   All other review of systems is negative unless indicated above.    MEDICATIONS:  MEDICATIONS  (STANDING):  acetaminophen     Tablet .. 975 milliGRAM(s) Oral every 8 hours  apixaban 2.5 milliGRAM(s) Oral two times a day  atorvastatin 80 milliGRAM(s) Oral at bedtime  ceFAZolin   IVPB 2000 milliGRAM(s) IV Intermittent every 12 hours  chlorhexidine 2% Cloths 1 Application(s) Topical daily  HYDROmorphone  Injectable 0.5 milliGRAM(s) IV Push once  influenza  Vaccine (HIGH DOSE) 0.7 milliLiter(s) IntraMuscular once  insulin lispro (ADMELOG) corrective regimen sliding scale   SubCutaneous three times a day before meals  insulin lispro (ADMELOG) corrective regimen sliding scale   SubCutaneous at bedtime  metoprolol succinate  milliGRAM(s) Oral daily  multivitamin 1 Tablet(s) Oral daily  pantoprazole    Tablet 40 milliGRAM(s) Oral before breakfast  polyethylene glycol 3350 17 Gram(s) Oral at bedtime  raloxifene 60 milliGRAM(s) Oral daily  senna 2 Tablet(s) Oral at bedtime  sodium chloride 0.9%. 1000 milliLiter(s) (75 mL/Hr) IV Continuous <Continuous>    PHYSICAL EXAM:  Vital Signs Last 24 Hrs  T(C): 36.9 (22 Nov 2022 08:46), Max: 37.4 (22 Nov 2022 00:35)  T(F): 98.4 (22 Nov 2022 08:46), Max: 99.4 (22 Nov 2022 00:35)  HR: 57 (22 Nov 2022 08:46) (57 - 80)  BP: 134/67 (22 Nov 2022 08:46) (122/64 - 152/78)  BP(mean): --  RR: 18 (22 Nov 2022 08:46) (18 - 18)  SpO2: 100% (22 Nov 2022 08:46) (96% - 100%)    Parameters below as of 22 Nov 2022 08:46  Patient On (Oxygen Delivery Method): room air    I&O's Summary    21 Nov 2022 07:01  -  22 Nov 2022 07:00  --------------------------------------------------------  IN: 540 mL / OUT: 1550 mL / NET: -1010 mL    22 Nov 2022 07:01  -  22 Nov 2022 09:37  --------------------------------------------------------  IN: 320 mL / OUT: 0 mL / NET: 320 mL    Appearance: NAD	  HEENT: Normal oral mucosa, PERRL, EOMI	  Lymphatic: No lymphadenopathy , no edema  Cardiovascular: Normal S1 S2, No JVD, No murmurs , Peripheral pulses palpable 2+ bilaterally  Respiratory: Lungs clear to auscultation, normal effort 	  Gastrointestinal:  Soft, Non-tender, + BS	  Skin: No rashes, No ecchymoses, No cyanosis, warm to touch - LLE: BJKI; dressing c/d/i; JEFFERSON drain draining serosang fluid, knee mobilizer on - RUE PICC  Musculoskeletal: Normal range of motion, normal strength  Psychiatry:  Mood & affect appropriate    LABS:    CARDIAC MARKERS:                        8.0    11.04 )-----------( 277      ( 21 Nov 2022 07:24 )             25.3     11-21    133<L>  |  99  |  21  ----------------------------<  161<H>  4.1   |  27  |  1.20    Ca    9.0      21 Nov 2022 07:15    TPro  6.4  /  Alb  2.9<L>  /  TBili  0.3  /  DBili  x   /  AST  20  /  ALT  <5<L>  /  AlkPhos  58  11-21    proBNP:   Lipid Profile:   HgA1c:   TSH:     TELEMETRY: 	    ECG:  	  RADIOLOGY:   DIAGNOSTIC TESTING:  [ ] Echocardiogram:  [ ]  Catheterization:  [ ] Stress Test:    OTHER:

## 2022-11-30 LAB
CULTURE RESULTS: SIGNIFICANT CHANGE UP
ORGANISM # SPEC MICROSCOPIC CNT: SIGNIFICANT CHANGE UP
ORGANISM # SPEC MICROSCOPIC CNT: SIGNIFICANT CHANGE UP
SPECIMEN SOURCE: SIGNIFICANT CHANGE UP

## 2022-12-01 ENCOUNTER — APPOINTMENT (OUTPATIENT)
Dept: INFECTIOUS DISEASE | Facility: CLINIC | Age: 77
End: 2022-12-01

## 2022-12-06 NOTE — ED PROVIDER NOTE - ATTENDING WITH...
"Pharmacy Chemotherapy Verification    DX:  Metastatic Rectal Adenocarcinoma    Cycle 18  Previous treatment: C17 11/15/22    Protocol: FOLFiri + bevacizumab  Irinotecan  IV on day 1   -C6 10/25/22 Remove from the plan per  Dr. Aguila  -Added back at 150 mg/m2 starting Cycle 17   -Discontinued for tolerance/heavily pretreated  5-FU iv over 46 hrs   -Dose reduced to 2000 mg/m2 for tolerance/heavily pretreated   -Bolus discontinued for tolerance/heavily pretreated  Bevacizumab 5 mg/kg V on day 1   -3/22/22- HOLD for cycle 1 per MD   -11/15/22 HOLD for Cycle 17 due to proteinuria   for 8-12 cycles  Q21 day cycle  starting C16 10/25/22 per MD  NCCN Guidelines for Colon Cancer V.2.2018  Braldy V, Et al - Lancet Oncol. 2014 Sep;15(10):1068-75. Doi:  [id number]. Epub 2014 Jul 31.  Bulmaro MARRERO, et al - J Clin Oncol. 2007 Oct 20;25(30):3851-56.    Allergies:Patient has no known allergies  BP (!) 146/88   Pulse 80   Temp 36.1 °C (97 °F) (Temporal)   Resp 18   Ht 1.71 m (5' 7.32\")   Wt 97.3 kg (214 lb 8.1 oz)   SpO2 98%   BMI 33.28 kg/m²  Body surface area is 2.15 meters squared.    All lab results, BP and urine protein 12/6/22 within treatment parameters.       Irinotecan 150 mg/m2 x 2.15m2 = 322.5mg   <10% difference, OK to treat with final dose = 322.6mg IV    Bevacizumab-bvzr (Zirabev) 5 mg/kg x 97.3kg = 486.5mg   Rounded to vial size(within 10%) per dose rounding protocol.               OK to treat with final dose = 500mg IV    Fluorouracil 2000 mg/m2 x 2.15m2 = 4300mg    <10% difference, ok to treat with final dose = 4300mg CIVI over 46 hours   Via CADD pump for home infusion over 46 hours @ 1.9 mL/hr    HEATHER Grande PharmRohiniD.                    " Student

## 2022-12-08 ENCOUNTER — APPOINTMENT (OUTPATIENT)
Dept: ORTHOPEDIC SURGERY | Facility: CLINIC | Age: 77
End: 2022-12-08

## 2022-12-08 VITALS — WEIGHT: 120 LBS | HEIGHT: 60 IN | BODY MASS INDEX: 23.56 KG/M2

## 2022-12-08 DIAGNOSIS — Z87.2 PERSONAL HISTORY OF DISEASES OF THE SKIN AND SUBCUTANEOUS TISSUE: ICD-10-CM

## 2022-12-08 DIAGNOSIS — T84.89XD OTHER SPECIFIED COMPLICATION OF INTERNAL ORTHOPEDIC PROSTHETIC DEVICES, IMPLANTS AND GRAFTS, SUBSEQUENT ENCOUNTER: ICD-10-CM

## 2022-12-08 DIAGNOSIS — T84.59XA INFECTION AND INFLAMMATORY REACTION DUE TO OTHER INTERNAL JOINT PROSTHESIS, INITIAL ENCOUNTER: ICD-10-CM

## 2022-12-08 DIAGNOSIS — Z96.659 INFECTION AND INFLAMMATORY REACTION DUE TO OTHER INTERNAL JOINT PROSTHESIS, INITIAL ENCOUNTER: ICD-10-CM

## 2022-12-08 DIAGNOSIS — T81.41XA INFECTION FOLLOWING A PROCEDURE,SUPERFICIAL INCISIONAL SURGI SITE,INITIAL ENC: ICD-10-CM

## 2022-12-08 DIAGNOSIS — Z96.651 PRESENCE OF RIGHT ARTIFICIAL KNEE JOINT: ICD-10-CM

## 2022-12-08 DIAGNOSIS — Z96.659 OTHER SPECIFIED COMPLICATION OF INTERNAL ORTHOPEDIC PROSTHETIC DEVICES, IMPLANTS AND GRAFTS, SUBSEQUENT ENCOUNTER: ICD-10-CM

## 2022-12-08 DIAGNOSIS — M25.669 OTHER SPECIFIED COMPLICATION OF INTERNAL ORTHOPEDIC PROSTHETIC DEVICES, IMPLANTS AND GRAFTS, SUBSEQUENT ENCOUNTER: ICD-10-CM

## 2022-12-08 DIAGNOSIS — T84.84XA PAIN DUE TO INTERNAL ORTHOPEDIC PROSTHETIC DEVICES, IMPLANTS AND GRAFTS, INITIAL ENCOUNTER: ICD-10-CM

## 2022-12-08 DIAGNOSIS — Z96.651 PAIN DUE TO INTERNAL ORTHOPEDIC PROSTHETIC DEVICES, IMPLANTS AND GRAFTS, INITIAL ENCOUNTER: ICD-10-CM

## 2022-12-08 DIAGNOSIS — M17.0 BILATERAL PRIMARY OSTEOARTHRITIS OF KNEE: ICD-10-CM

## 2022-12-08 PROCEDURE — 73560 X-RAY EXAM OF KNEE 1 OR 2: CPT | Mod: RT

## 2022-12-08 PROCEDURE — 99024 POSTOP FOLLOW-UP VISIT: CPT

## 2022-12-08 NOTE — DISCUSSION/SUMMARY
[de-identified] : 1 month status post right total knee arthroplasty explantation, radiation debridement and static spacer placement with knee fusion.  Continue to be toe-touch weightbearing 20%.  Continue cefazolin antibiotic until ID discontinues medication.  She needs to follow-up with Dr. Holley to determine the length of her antibiotic.  She needs ESR and CRP sent weekly.  I would like them to be sent to myself and Dr. Holley.  No new motion allowed.  Knee brace at all times.  Follow-up 2 weeks after the antibiotics are finished to perform an aspiration of the knee to rule out infection but we can plan replacement of the knee again.

## 2022-12-08 NOTE — REASON FOR VISIT
[Post-Operative Visit] : a post-operative visit for [Joint Replacement Surgery, Aftercare] : joint replacement surgery, aftercare

## 2022-12-08 NOTE — PHYSICAL EXAM
[de-identified] : Patient is well nourished, well-developed, in no acute distress, with appropriate mood and affect. The patient is oriented to time, place, and person. Respirations are even and unlabored. Examination reveals satisfactory wound healing. No surrounding erythema.  The knee is stable.  Brace donned.\par \par The patient returns to the office today for suture removal from the right knee wound. The sutures have been in since surgery. The patient has no complaints. The wound is healing well and is without evidence of infection or wound dehiscence. The wound was prepped with betadine and forceps and a scalpel were used to remove all sutures in their entirety. Steri strips were placed over the wound and the patient was instructed to leave these in place until they fall off on their own. The patient tolerated the procedure well and there were no immediate complications. The wound edges are well adhered. The patient was instructed to continue to keep it clean.  [de-identified] : AP and lateral radiographs of the right knee were ordered obtained the office and demonstrate tempora knee fusion construct with cement spacer in place.

## 2022-12-08 NOTE — HISTORY OF PRESENT ILLNESS
[de-identified] : Status post right total knee arthroplasty explantation, irrigation debridement and static spacer placement with temporary knee effusion.  Recovering at rehab.  They are not sending her for weekly ESR and CRP's for some reason.  She is on cefazolin for an infection.  She is toe-touch weightbearing.  She has been compliant she states.  In wheelchair today.  Pain is controlled.

## 2022-12-12 LAB — FUNGUS FLD CULT: NORMAL

## 2022-12-17 LAB
CULTURE RESULTS: SIGNIFICANT CHANGE UP
SPECIMEN SOURCE: SIGNIFICANT CHANGE UP

## 2022-12-22 ENCOUNTER — APPOINTMENT (OUTPATIENT)
Dept: INFECTIOUS DISEASE | Facility: CLINIC | Age: 77
End: 2022-12-22

## 2022-12-22 VITALS
TEMPERATURE: 98.5 F | DIASTOLIC BLOOD PRESSURE: 77 MMHG | HEART RATE: 64 BPM | OXYGEN SATURATION: 97 % | HEIGHT: 60 IN | SYSTOLIC BLOOD PRESSURE: 146 MMHG

## 2022-12-22 PROCEDURE — 99212 OFFICE O/P EST SF 10 MIN: CPT

## 2022-12-22 NOTE — HISTORY OF PRESENT ILLNESS
[FreeTextEntry1] : 78 y/o female comes for followup for right knee PJI.\par \par She is s/p right TKA explant/spacer placement.\par \par She is on IV ancef at rehab and rehab is going well. No fevers or chills. No GI issues. No issues with picc. \par \par No GI or Gu issues. \par \par MSSA in cx\par \par \par \par

## 2022-12-22 NOTE — PHYSICAL EXAM
[General Appearance - Alert] : alert [General Appearance - In No Acute Distress] : in no acute distress [Sclera] : the sclera and conjunctiva were normal [PERRL With Normal Accommodation] : pupils were equal in size, round, reactive to light [Extraocular Movements] : extraocular movements were intact [Outer Ear] : the ears and nose were normal in appearance [Oropharynx] : the oropharynx was normal with no thrush [Neck Appearance] : the appearance of the neck was normal [Neck Cervical Mass (___cm)] : no neck mass was observed [Jugular Venous Distention Increased] : there was no jugular-venous distention [Thyroid Diffuse Enlargement] : the thyroid was not enlarged [Auscultation Breath Sounds / Voice Sounds] : lungs were clear to auscultation bilaterally [Heart Sounds] : normal S1 and S2 [Full Pulse] : the pedal pulses are present [Edema] : there was no peripheral edema [Bowel Sounds] : normal bowel sounds [Abdomen Soft] : soft [Abdomen Tenderness] : non-tender [Abdomen Mass (___ Cm)] : no abdominal mass palpated [Costovertebral Angle Tenderness] : no CVA tenderness [Skin Color & Pigmentation] : normal skin color and pigmentation [] : no rash [No Focal Deficits] : no focal deficits [Oriented To Time, Place, And Person] : oriented to person, place, and time [Affect] : the affect was normal [FreeTextEntry1] : right knee incision intact, healing, no redness

## 2022-12-22 NOTE — ASSESSMENT
[Treatment Education] : treatment education [Treatment Adherence] : treatment adherence [Rx Dose / Side Effects] : Rx dose/side effects [FreeTextEntry1] : 76 y/o female with right knee PJI comes for visit now s/p stage 1 right knee explant, abx spacer placement  \par \par On ancef 2 gm iv q12 at rehab. No issues with picc. \par \par Risks of abx explained including GI, renal issues, allergy, etc.\par \par Complete abx on 1/4/23.\par \par F/u with ortho for further testing and stage 2 planning. \par \par \par \par

## 2022-12-31 LAB
CULTURE RESULTS: SIGNIFICANT CHANGE UP
SPECIMEN SOURCE: SIGNIFICANT CHANGE UP

## 2023-01-04 ENCOUNTER — APPOINTMENT (OUTPATIENT)
Dept: ORTHOPEDIC SURGERY | Facility: CLINIC | Age: 78
End: 2023-01-04
Payer: MEDICARE

## 2023-01-04 PROCEDURE — 73560 X-RAY EXAM OF KNEE 1 OR 2: CPT | Mod: RT

## 2023-01-04 PROCEDURE — 99024 POSTOP FOLLOW-UP VISIT: CPT

## 2023-01-04 NOTE — HISTORY OF PRESENT ILLNESS
[de-identified] : Status post right total knee arthroplasty explantation, irrigation debridement and static spacer placement with temporary knee fusion. Recovering at rehab. She is on cefazolin for an infection to finish today.  She is toe-touch weightbearing.   In wheelchair today.  Pain is controlled.  I still do not see any evidence of ESR/CRP.

## 2023-01-04 NOTE — DISCUSSION/SUMMARY
[de-identified] : 1 month status post right total knee arthroplasty explantation, debridement and static spacer placement with knee fusion.  Continue to be toe-touch weightbearing 20%. Finishing abx today. ESR and CRP were reinforced again that he needs to be set weekly. No knee motion allowed.  Knee brace at all times.  Follow-up 2 weeks after the antibiotics are finished to perform an aspiration of the knee to rule out infection but we can plan replacement of the knee again.

## 2023-01-04 NOTE — PHYSICAL EXAM
[de-identified] : Patient is well nourished, well-developed, in no acute distress, with appropriate mood and affect. The patient is oriented to time, place, and person. Respirations are even and unlabored. Examination reveals satisfactory wound healing. No surrounding erythema.  The knee is stable.  Brace donned. [de-identified] : AP and lateral radiographs of the right knee were ordered obtained the office and demonstrate knee fusion construct with cement spacer in place.

## 2023-01-06 NOTE — ASU PREOP CHECKLIST - BP NONINVASIVE SYSTOLIC (MM HG)
117 Niacinamide Pregnancy And Lactation Text: These medications are considered safe during pregnancy.

## 2023-01-26 NOTE — H&P PST ADULT - LAB RESULTS AND INTERPRETATION
History  Chief Complaint   Patient presents with   • Abdominal Pain     Patient presents to the ED with c/o pain in RUQ, hx of gallstones per paperwork patient supplied  Patient has expressive aphasia and is unable to communicate when pain began     Patient is a 53-year-old male with history of CVA with severe expressive aphasia, known gallstones who presents for evaluation of right upper quad abdominal pain  History is limited secondary to the patient's aphasia but he does gesture to his right upper quadrant/right lower abdomen as this is where he is having pain  Is not take anything for the pain and is refusing any pain medication at this time  Denies nausea or vomiting  He has not been having any hematuria or dysuria  Denies diarrhea, melena and hematochezia  Prior to Admission Medications   Prescriptions Last Dose Informant Patient Reported?  Taking?   acetaminophen (TYLENOL) 325 mg tablet   No No   Sig: Take 2 tablets (650 mg total) by mouth every 6 (six) hours as needed for mild pain   ibuprofen (MOTRIN) 200 mg tablet   No No   Sig: Take 3 tablets (600 mg total) by mouth every 6 (six) hours as needed for moderate pain   lisinopril (ZESTRIL) 10 mg tablet   No No   Sig: Take 1 tablet (10 mg total) by mouth daily   methocarbamol (ROBAXIN) 500 mg tablet   No No   Sig: Take 1 tablet (500 mg total) by mouth 3 (three) times a day as needed for muscle spasms   multivitamin (THERAGRAN) TABS   Yes No   Sig: Take 1 tablet by mouth daily      Facility-Administered Medications: None       Past Medical History:   Diagnosis Date   • Arthritis    • CVA (cerebral vascular accident) ([facility] [address] )    • Hypertension        Past Surgical History:   Procedure Laterality Date   • CARDIAC CATHETERIZATION     • CARDIAC SURGERY     • OK EXC B9 LESION MRGN XCP SK TG T/A/L 1 1-2 0 CM Right 6/14/2022    Procedure: EXCISION  BIOPSY LESION/MASS BACK;  Surgeon: Neda Turner MD;  Location:  MAIN OR;  Service: General   • TISSUE PLASMINOGEN ACTIVATOR (TPA), EIA(HISTORICAL)         Family History   Problem Relation Age of Onset   • COPD Mother    • Diabetes Mother    • Alzheimer's disease Mother    • No Known Problems Father    • Asthma Child      I have reviewed and agree with the history as documented  E-Cigarette/Vaping   • E-Cigarette Use Never User      E-Cigarette/Vaping Substances     Social History     Tobacco Use   • Smoking status: Former     Types: Cigarettes     Quit date: 2019     Years since quittin 0   • Smokeless tobacco: Never   Vaping Use   • Vaping Use: Never used   Substance Use Topics   • Alcohol use: Not Currently     Comment: socially   • Drug use: Never       Review of Systems   Constitutional: Negative for fever  HENT: Negative for sore throat  Respiratory: Negative for shortness of breath  Cardiovascular: Negative for chest pain  Gastrointestinal: Positive for abdominal pain  Genitourinary: Negative for dysuria  Musculoskeletal: Negative for back pain  Skin: Negative for rash  Neurological: Negative for light-headedness  Hematological: Negative for adenopathy  Psychiatric/Behavioral: Negative for agitation  All other systems reviewed and are negative  Physical Exam  Physical Exam  Vitals reviewed  Constitutional:       General: He is not in acute distress  Appearance: He is well-developed  HENT:      Head: Normocephalic  Eyes:      Pupils: Pupils are equal, round, and reactive to light  Cardiovascular:      Rate and Rhythm: Normal rate and regular rhythm  Heart sounds: Normal heart sounds  No murmur heard  No friction rub  No gallop  Pulmonary:      Effort: Pulmonary effort is normal       Breath sounds: Normal breath sounds  Abdominal:      General: Bowel sounds are normal  There is no distension  Palpations: Abdomen is soft  Tenderness: There is abdominal tenderness  There is no guarding        Comments: Moderate upper quadrant abdominal tenderness without rebound tenderness or guarding   Musculoskeletal:         General: Normal range of motion  Cervical back: Normal range of motion and neck supple  Skin:     Capillary Refill: Capillary refill takes less than 2 seconds  Neurological:      Mental Status: He is alert and oriented to person, place, and time  Cranial Nerves: No cranial nerve deficit  Sensory: No sensory deficit  Motor: No abnormal muscle tone  Psychiatric:         Behavior: Behavior normal          Thought Content: Thought content normal          Judgment: Judgment normal          Vital Signs  ED Triage Vitals   Temperature Pulse Respirations Blood Pressure SpO2   01/26/23 1301 01/26/23 1301 01/26/23 1301 01/26/23 1305 01/26/23 1301   97 5 °F (36 4 °C) (!) 121 18 (!) 165/108 96 %      Temp Source Heart Rate Source Patient Position - Orthostatic VS BP Location FiO2 (%)   01/26/23 1301 01/26/23 1301 01/26/23 1301 01/26/23 1301 --   Temporal Monitor Sitting Left arm       Pain Score       01/26/23 1301       9           Vitals:    01/26/23 1458 01/26/23 1530 01/26/23 1630 01/26/23 1730   BP:  160/92 155/92 150/92   Pulse: 105 94 91 80   Patient Position - Orthostatic VS:  Sitting Sitting Sitting         Visual Acuity      ED Medications  Medications   sodium chloride 0 9 % bolus 1,000 mL (0 mL Intravenous Stopped 1/26/23 1734)   iohexol (OMNIPAQUE) 350 MG/ML injection (SINGLE-DOSE) 100 mL (100 mL Intravenous Given 1/26/23 1548)       Diagnostic Studies  Results Reviewed     Procedure Component Value Units Date/Time    Blood culture #1 [[id number]] Collected: 01/26/23 1440    Lab Status: Preliminary result Specimen: Blood from Arm, Right Updated: 01/27/23 0002     Blood Culture Received in Microbiology Lab  Culture in Progress      Blood culture #2 [[id number]] Collected: 01/26/23 1314    Lab Status: Preliminary result Specimen: Blood from Arm, Left Updated: 01/27/23 0002     Blood Culture Received in Microbiology Lab  Culture in Progress  HS Troponin 0hr (reflex protocol) [[id number]]  (Normal) Collected: 01/26/23 1314    Lab Status: Final result Specimen: Blood from Arm, Right Updated: 01/26/23 1635     hs TnI 0hr 3 ng/L     UA w Reflex to Microscopic w Reflex to Culture [[id number]] Collected: 01/26/23 1446    Lab Status: Final result Specimen: Urine, Clean Catch Updated: 01/26/23 1529     Color, UA Yellow     Clarity, UA Clear     Specific Gravity, UA 1 015     pH, UA 5 5     Leukocytes, UA Negative     Nitrite, UA Negative     Protein, UA Negative mg/dl      Glucose, UA Negative mg/dl      Ketones, UA Negative mg/dl      Urobilinogen, UA <2 0 mg/dl      Bilirubin, UA Negative     Occult Blood, UA Negative    Lipase [[id number]]  (Normal) Collected: 01/26/23 1446    Lab Status: Final result Specimen: Blood from Arm, Right Updated: 01/26/23 1521     Lipase 155 u/L     Procalcitonin [[id number]]  (Normal) Collected: 01/26/23 1314    Lab Status: Final result Specimen: Blood from Arm, Left Updated: 01/26/23 1401     Procalcitonin 0 12 ng/ml     Lactic acid [[id number]]  (Normal) Collected: 01/26/23 1314    Lab Status: Final result Specimen: Blood from Arm, Left Updated: 01/26/23 1354     LACTIC ACID 1 3 mmol/L     Narrative:      Result may be elevated if tourniquet was used during collection      Comprehensive metabolic panel [[id number]]  (Abnormal) Collected: 01/26/23 1314    Lab Status: Final result Specimen: Blood from Arm, Left Updated: 01/26/23 1351     Sodium 139 mmol/L      Potassium 3 6 mmol/L      Chloride 103 mmol/L      CO2 27 mmol/L      ANION GAP 9 mmol/L      BUN 13 mg/dL      Creatinine 1 31 mg/dL      Glucose 98 mg/dL      Calcium 9 1 mg/dL      AST 38 U/L      ALT 52 U/L      Alkaline Phosphatase 79 U/L      Total Protein 7 9 g/dL      Albumin 4 7 g/dL      Total Bilirubin 0 70 mg/dL      eGFR 60 ml/min/1 73sq m     Narrative:      [place] guidelines for Chronic Kidney Disease (CKD): •  Stage 1 with normal or high GFR (GFR > 90 mL/min/1 73 square meters)  •  Stage 2 Mild CKD (GFR = 60-89 mL/min/1 73 square meters)  •  Stage 3A Moderate CKD (GFR = 45-59 mL/min/1 73 square meters)  •  Stage 3B Moderate CKD (GFR = 30-44 mL/min/1 73 square meters)  •  Stage 4 Severe CKD (GFR = 15-29 mL/min/1 73 square meters)  •  Stage 5 End Stage CKD (GFR <15 mL/min/1 73 square meters)  Note: GFR calculation is accurate only with a steady state creatinine    APTT [[id number]]  (Normal) Collected: 01/26/23 1314    Lab Status: Final result Specimen: Blood from Arm, Left Updated: 01/26/23 1339     PTT 25 seconds     Protime-INR [[id number]]  (Normal) Collected: 01/26/23 1314    Lab Status: Final result Specimen: Blood from Arm, Left Updated: 01/26/23 1339     Protime 13 7 seconds      INR 0 98    CBC and differential [[id number]] Collected: 01/26/23 1314    Lab Status: Final result Specimen: Blood Updated: 01/26/23 1327     WBC 6 24 Thousand/uL      RBC 4 99 Million/uL      Hemoglobin 16 7 g/dL      Hematocrit 47 5 %      MCV 95 fL      MCH 33 5 pg      MCHC 35 2 g/dL      RDW 12 3 %      MPV 9 2 fL      Platelets 829 Thousands/uL      nRBC 0 /100 WBCs      Neutrophils Relative 62 %      Immat GRANS % 0 %      Lymphocytes Relative 29 %      Monocytes Relative 7 %      Eosinophils Relative 1 %      Basophils Relative 1 %      Neutrophils Absolute 3 89 Thousands/µL      Immature Grans Absolute 0 00 Thousand/uL      Lymphocytes Absolute 1 81 Thousands/µL      Monocytes Absolute 0 42 Thousand/µL      Eosinophils Absolute 0 08 Thousand/µL      Basophils Absolute 0 04 Thousands/µL                  CT abdomen pelvis with contrast   Final Result by Christal Bhandari MD (01/26 4381)      Normal appendix  No evidence of bowel obstruction, colitis or diverticulitis  Workstation performed: [id number]          right upper quadrant   Final Result by Marianna Vergara MD (01/26 7623)      1    Cholelithiasis in a contracted gallbladder  No findings for acute cholecystitis  2    Moderate hepatic steatosis  Workstation performed: [id number]                    Procedures  Procedures         ED Course  ED Course as of 01/27/23 1803   Thu Jan 26, 2023   1426 Creatinine(!): 1 31  Around baseline                                 SBIRT 22yo+    Flowsheet Row Most Recent Value   SBIRT (23 yo +)    In order to provide better care to our patients, we are screening all of our patients for alcohol and drug use  Would it be okay to ask you these screening questions? Yes Filed at: 01/26/2023 1430   Initial Alcohol Screen: US AUDIT-C     1  How often do you have a drink containing alcohol? 0 Filed at: 01/26/2023 1430   2  How many drinks containing alcohol do you have on a typical day you are drinking? 0 Filed at: 01/26/2023 1430   3a  Male UNDER 65: How often do you have five or more drinks on one occasion? 0 Filed at: 01/26/2023 1430   3b  FEMALE Any Age, or MALE 65+: How often do you have 4 or more drinks on one occassion? 0 Filed at: 01/26/2023 1430   Audit-C Score 0 Filed at: 01/26/2023 1430   LUIS: How many times in the past year have you    Used an illegal drug or used a prescription medication for non-medical reasons? Never Filed at: 01/26/2023 1430                    Medical Decision Making  Patient is a 78-year-old male who presents for evaluation of abdominal pain  History is limited secondary to the patient's expressive aphasia but he is able to indicate where he is having pain  Mild tenderness on exam, CT imaging and ultrasound show cholelithiasis without evidence of cholecystitis  Discussed with general surgery and they advised outpatient follow-up  Otherwise his blood work is unremarkable including a negative troponin, normal LFTs, and no evidence of leukocytosis  Signed out to Dr Alisa Amaya pending completion of the work-up      Generalized abdominal pain: acute illness or injury  Amount and/or Complexity of Data Reviewed  Labs: ordered  Decision-making details documented in ED Course  Radiology: ordered  Risk  Prescription drug management  Disposition  Final diagnoses:   Generalized abdominal pain     Time reflects when diagnosis was documented in both MDM as applicable and the Disposition within this note     Time User Action Codes Description Comment    1/26/2023  5:34 PM Darlene Mcclelland Add [R10 84] Generalized abdominal pain       ED Disposition     ED Disposition   Discharge    Condition   Stable    Date/Time   Thu Jan 26, 2023  5:34 PM    Comment   Malissa Romeo II discharge to home/self care  Follow-up Information     Follow up With Specialties Details Why Wili Shin DO Internal Medicine, Family Medicine   [place] [address]  [phone]            Discharge Medication List as of 1/26/2023  5:34 PM      CONTINUE these medications which have NOT CHANGED    Details   acetaminophen (TYLENOL) 325 mg tablet Take 2 tablets (650 mg total) by mouth every 6 (six) hours as needed for mild pain, Starting Tue 6/14/2022, No Print      ibuprofen (MOTRIN) 200 mg tablet Take 3 tablets (600 mg total) by mouth every 6 (six) hours as needed for moderate pain, Starting Tue 6/14/2022, No Print      lisinopril (ZESTRIL) 10 mg tablet Take 1 tablet (10 mg total) by mouth daily, Starting Mon 10/10/2022, Until Wed 11/9/2022, Normal      methocarbamol (ROBAXIN) 500 mg tablet Take 1 tablet (500 mg total) by mouth 3 (three) times a day as needed for muscle spasms, Starting Thu 9/1/2022, Normal      multivitamin (THERAGRAN) TABS Take 1 tablet by mouth daily, Historical Med             No discharge procedures on file      PDMP Review     None          ED Provider  Electronically Signed by           Carlos Collazo MD  01/27/23 2800 CBC BMP T&S  Hgba1C  UA CS MRSA swab

## 2023-01-31 ENCOUNTER — APPOINTMENT (OUTPATIENT)
Dept: ORTHOPEDIC SURGERY | Facility: CLINIC | Age: 78
End: 2023-01-31
Payer: MEDICARE

## 2023-01-31 PROCEDURE — 73560 X-RAY EXAM OF KNEE 1 OR 2: CPT | Mod: RT

## 2023-01-31 PROCEDURE — 20610 DRAIN/INJ JOINT/BURSA W/O US: CPT | Mod: 79,RT

## 2023-01-31 PROCEDURE — 99214 OFFICE O/P EST MOD 30 MIN: CPT | Mod: 24,25

## 2023-02-01 LAB
B PERT IGG+IGM PNL SER: ABNORMAL
COLOR FLD: NORMAL
EOSINOPHIL # FLD MANUAL: 2 %
FLUID INTAKE SUBSTANCE CLASS: NORMAL
LYMPHOCYTES # FLD MANUAL: 16 %
MONOS+MACROS NFR FLD MANUAL: 3 %
NEUTS SEG # FLD MANUAL: 79 %
RBC # FLD MANUAL: ABNORMAL /UL
SYCRY CLARITY: ABNORMAL
SYCRY COLOR: ABNORMAL
SYCRY ID: NORMAL
SYCRY TUBE: NORMAL
TOTAL CELLS COUNTED FLD: 75 /UL
TUBE TYPE: NORMAL

## 2023-02-01 NOTE — REASON FOR VISIT
[Follow-Up Visit] : a follow-up visit for [Knee Pain] : knee pain [Joint Replacement Surgery, Aftercare] : joint replacement surgery, aftercare

## 2023-02-01 NOTE — PROCEDURE
[de-identified] : Informed consent for the right knee aspiration was obtained. All risks, benefits and alternatives were discussed. These included but were not limited to bleeding, infection, allergic reaction and reaccumulation of fluid.  All questions were answered. A time out was performed. The right knee was prepped and draped in sterile fashion. Using sterile technique, the right knee was aspirated of approximately 5 cc of blood using a 18-gauge needle. A sterile dressing was applied. Post aspiration instructions were reviewed. The patient tolerated the procedure well.\par

## 2023-02-01 NOTE — DISCUSSION/SUMMARY
[de-identified] : 2.5 months status post right total knee arthroplasty explantation, debridement and static spacer placement with knee fusion.  Continue to be toe-touch weightbearing 20%.  No knee motion.  At this point the wound is healed.  I would like to start planning for revision total knee arthroplasty for reimplantation.  She is off antibiotics.  Again I reinforced that she needs to have ESR and CRP.  Knee brace at all times.  Today we performed an aspiration of the right knee to rule out chronic infection.  This will be sent for culture and gram stain.  As long as this is negative we will proceed back to the operating for right total knee arthroplasty revision.  We discussed the increased complexity of this revision because she will need a hinged and a proximal tibial replacement.  We also discussed increased risk of extensor mechanism rupture because of how necrotic bone was at the time of surgery there is very limited bone around the patella tendon insertion of the tibial tubercle but we will do everything we can to protect this.  Failure the extensor mechanism can greatly complicate her outcome which was discussed with the patient and could require extensor mechanism reconstruction in the future should this fail.\par \par The patient is an appropriate candidate for consideration of right revision total knee arthroplasty after static spacer. This will be done in conjunction with PRS (Scott) for local rotation muscle flap and STSG given skin contraction around spacer. Risk of recurrence of infection or new infection. This recommendation is based on the patient's pain, function, and bone stock. An extensive discussion was conducted of the natural history of this particular problem and the variety of surgical and non-surgical treatment options available to the patient. A risk/benefit analysis was discussed with the patient reviewing the advantages and disadvantages of surgical intervention at this time. A full explanation was given of the nature and the purpose of the procedure and anesthesia, its benefits, possible alternative methods of diagnosis or treatment, the risks involved, the possibility of complications, the foreseeable consequences of the procedure and the possible results of the non-treatment. I reviewed the plan of care and I also used a model of a revision joint replacement implant equivalent to the one that will be used for their revision total joint replacement. The ability to secure the implant utilizing cement or cementless (press-fit) was discussed with the patient. The patient agrees with the plan of care, as well as the use of implants for their revision joint replacement. \par \par No guarantee or assurance was made as to the results that may be obtained. Specifically, the risks were identified to include, but are not limited to the following: Infection, phlebitis, pulmonary embolism, death, paralysis, dislocation, pain, stiffness, instability, limp, weakness, breakage, leg-length inequality, uncontrolled bleeding, nerve injury, blood vessel injury, pressure sores, anesthetic risks, delayed healing of wound and bone, and wear and loosening. Further discussion was undertaken with the patient about the details of surgical preparation, treatment, and postoperative rehabilitation including medical clearance, autotransfusion, the hospital course, and the postoperative rehabilitation involved. Reimplantation may require cemented or cementless components, or both, depending upon a variety of factors that must be assessed at the time of surgery. The need for bone graft (either autograft or allograft) to enhance the chance for success of the procedure(s) was discussed. All in all, I feel that this patient is a good candidate for surgical reconstruction.\par \par The patient and I discussed the current SARS-CoV-2 (COVID-19) pandemic which has affected our local hospitals. We discussed that our hospitals treat patients with COVID-19. All efforts will be made to avoid cohorting the patient with diagnosed or suspected COVID-19 patient. They also understand that we will screen them 24-48 hours prior to surgery. Despite our best efforts, there is a potential risk for iatrogenic transmission of COVID-19 to the patient during the perioperative period. Chuck COVID-19 during the perioperative period may increase the patient´s risks of an adverse outcome including postoperative pneumonia, difficulty breathing, requirement for a breathing tube (general endotracheal intubation), and death. The patient is understanding of this risk, and is willing to proceed with surgery at this time.

## 2023-02-01 NOTE — PHYSICAL EXAM
[de-identified] : Patient is well nourished, well-developed, in no acute distress, with appropriate mood and affect. The patient is oriented to time, place, and person. Respirations are even and unlabored. Examination reveals satisfactory wound healing. No surrounding erythema.  The knee is stable.  Brace in place. [de-identified] : AP and lateral radiographs of the right knee were ordered obtained the office and demonstrate knee fusion construct with cement spacer in place.

## 2023-02-01 NOTE — HISTORY OF PRESENT ILLNESS
[de-identified] : This is a very nice 77-year-old female who is status post right total knee arthroplasty explantation, irrigation debridement and static spacer placement 11/16/22 with temporary knee fusion. Recovering at home now from snf. She completed cefazolin 1/3/23.  She is toe-touch weightbearing.  In wheelchair today.  Pain is controlled. Still does not have ESR/CRP.

## 2023-02-14 ENCOUNTER — APPOINTMENT (OUTPATIENT)
Dept: PLASTIC SURGERY | Facility: CLINIC | Age: 78
End: 2023-02-14
Payer: MEDICARE

## 2023-02-14 VITALS
TEMPERATURE: 97.4 F | DIASTOLIC BLOOD PRESSURE: 66 MMHG | OXYGEN SATURATION: 98 % | HEART RATE: 51 BPM | RESPIRATION RATE: 16 BRPM | SYSTOLIC BLOOD PRESSURE: 134 MMHG

## 2023-02-14 PROCEDURE — 99204 OFFICE O/P NEW MOD 45 MIN: CPT

## 2023-02-27 NOTE — REASON FOR VISIT
[Consultation] : a consultation visit [Family Member] : family member [FreeTextEntry1] : Patient presents regarding final  knee placement  surgery , at the request of Dr Reyes. She s/p right total knee arthroplasty explantation, irrigation debridement and static spacer placement DOS 11/16/22 with temporary knee fusion. Patient doing well, she denies any complaints of pain. bleeding,drainage, itching, fever, or chills. She denies any recent studies.

## 2023-03-01 LAB
FUNGUS FLD CULT: NORMAL
JOINT CULTURE: NORMAL

## 2023-03-08 ENCOUNTER — OUTPATIENT (OUTPATIENT)
Dept: OUTPATIENT SERVICES | Facility: HOSPITAL | Age: 78
LOS: 1 days | End: 2023-03-08
Payer: MEDICARE

## 2023-03-08 VITALS
SYSTOLIC BLOOD PRESSURE: 148 MMHG | HEIGHT: 61 IN | WEIGHT: 130.07 LBS | TEMPERATURE: 98 F | DIASTOLIC BLOOD PRESSURE: 75 MMHG | RESPIRATION RATE: 16 BRPM | HEART RATE: 53 BPM | OXYGEN SATURATION: 97 %

## 2023-03-08 DIAGNOSIS — Z29.9 ENCOUNTER FOR PROPHYLACTIC MEASURES, UNSPECIFIED: ICD-10-CM

## 2023-03-08 DIAGNOSIS — T84.59XD INFECTION AND INFLAMMATORY REACTION DUE TO OTHER INTERNAL JOINT PROSTHESIS, SUBSEQUENT ENCOUNTER: ICD-10-CM

## 2023-03-08 DIAGNOSIS — Z98.890 OTHER SPECIFIED POSTPROCEDURAL STATES: Chronic | ICD-10-CM

## 2023-03-08 DIAGNOSIS — T84.53XD INFECTION AND INFLAMMATORY REACTION DUE TO INTERNAL RIGHT KNEE PROSTHESIS, SUBSEQUENT ENCOUNTER: ICD-10-CM

## 2023-03-08 DIAGNOSIS — Z90.49 ACQUIRED ABSENCE OF OTHER SPECIFIED PARTS OF DIGESTIVE TRACT: Chronic | ICD-10-CM

## 2023-03-08 DIAGNOSIS — Z01.818 ENCOUNTER FOR OTHER PREPROCEDURAL EXAMINATION: ICD-10-CM

## 2023-03-08 DIAGNOSIS — Z00.00 ENCOUNTER FOR GENERAL ADULT MEDICAL EXAMINATION W/OUT ABNORMAL FINDINGS: ICD-10-CM

## 2023-03-08 DIAGNOSIS — Z96.651 PRESENCE OF RIGHT ARTIFICIAL KNEE JOINT: Chronic | ICD-10-CM

## 2023-03-08 DIAGNOSIS — E11.9 TYPE 2 DIABETES MELLITUS WITHOUT COMPLICATIONS: ICD-10-CM

## 2023-03-08 LAB
A1C WITH ESTIMATED AVERAGE GLUCOSE RESULT: 6.3 % — HIGH (ref 4–5.6)
ANION GAP SERPL CALC-SCNC: 11 MMOL/L — SIGNIFICANT CHANGE UP (ref 5–17)
BLD GP AB SCN SERPL QL: NEGATIVE — SIGNIFICANT CHANGE UP
BUN SERPL-MCNC: 17 MG/DL — SIGNIFICANT CHANGE UP (ref 7–23)
CALCIUM SERPL-MCNC: 9.7 MG/DL — SIGNIFICANT CHANGE UP (ref 8.4–10.5)
CHLORIDE SERPL-SCNC: 100 MMOL/L — SIGNIFICANT CHANGE UP (ref 96–108)
CO2 SERPL-SCNC: 25 MMOL/L — SIGNIFICANT CHANGE UP (ref 22–31)
CREAT SERPL-MCNC: 0.98 MG/DL — SIGNIFICANT CHANGE UP (ref 0.5–1.3)
EGFR: 59 ML/MIN/1.73M2 — LOW
ESTIMATED AVERAGE GLUCOSE: 134 MG/DL — HIGH (ref 68–114)
GLUCOSE SERPL-MCNC: 174 MG/DL — HIGH (ref 70–99)
HCT VFR BLD CALC: 38.1 % — SIGNIFICANT CHANGE UP (ref 34.5–45)
HGB BLD-MCNC: 12.5 G/DL — SIGNIFICANT CHANGE UP (ref 11.5–15.5)
MCHC RBC-ENTMCNC: 30.8 PG — SIGNIFICANT CHANGE UP (ref 27–34)
MCHC RBC-ENTMCNC: 32.8 GM/DL — SIGNIFICANT CHANGE UP (ref 32–36)
MCV RBC AUTO: 93.8 FL — SIGNIFICANT CHANGE UP (ref 80–100)
NRBC # BLD: 0 /100 WBCS — SIGNIFICANT CHANGE UP (ref 0–0)
PLATELET # BLD AUTO: 248 K/UL — SIGNIFICANT CHANGE UP (ref 150–400)
POTASSIUM SERPL-MCNC: 4.1 MMOL/L — SIGNIFICANT CHANGE UP (ref 3.5–5.3)
POTASSIUM SERPL-SCNC: 4.1 MMOL/L — SIGNIFICANT CHANGE UP (ref 3.5–5.3)
RBC # BLD: 4.06 M/UL — SIGNIFICANT CHANGE UP (ref 3.8–5.2)
RBC # FLD: 13.1 % — SIGNIFICANT CHANGE UP (ref 10.3–14.5)
RH IG SCN BLD-IMP: POSITIVE — SIGNIFICANT CHANGE UP
SODIUM SERPL-SCNC: 136 MMOL/L — SIGNIFICANT CHANGE UP (ref 135–145)
WBC # BLD: 6.58 K/UL — SIGNIFICANT CHANGE UP (ref 3.8–10.5)
WBC # FLD AUTO: 6.58 K/UL — SIGNIFICANT CHANGE UP (ref 3.8–10.5)

## 2023-03-08 RX ORDER — LIDOCAINE HCL 20 MG/ML
0.2 VIAL (ML) INJECTION ONCE
Refills: 0 | Status: DISCONTINUED | OUTPATIENT
Start: 2023-03-20 | End: 2023-03-20

## 2023-03-08 RX ORDER — PANTOPRAZOLE SODIUM 20 MG/1
40 TABLET, DELAYED RELEASE ORAL ONCE
Refills: 0 | Status: COMPLETED | OUTPATIENT
Start: 2023-03-20 | End: 2023-03-20

## 2023-03-08 RX ORDER — CHLORHEXIDINE GLUCONATE 213 G/1000ML
1 SOLUTION TOPICAL ONCE
Refills: 0 | Status: DISCONTINUED | OUTPATIENT
Start: 2023-03-20 | End: 2023-03-20

## 2023-03-08 RX ORDER — CEFAZOLIN SODIUM 1 G
2000 VIAL (EA) INJECTION ONCE
Refills: 0 | Status: COMPLETED | OUTPATIENT
Start: 2023-03-20 | End: 2023-03-20

## 2023-03-08 RX ORDER — TRAMADOL HYDROCHLORIDE 50 MG/1
50 TABLET ORAL ONCE
Refills: 0 | Status: DISCONTINUED | OUTPATIENT
Start: 2023-03-20 | End: 2023-03-20

## 2023-03-08 RX ORDER — SODIUM CHLORIDE 9 MG/ML
3 INJECTION INTRAMUSCULAR; INTRAVENOUS; SUBCUTANEOUS EVERY 8 HOURS
Refills: 0 | Status: DISCONTINUED | OUTPATIENT
Start: 2023-03-20 | End: 2023-03-20

## 2023-03-08 NOTE — H&P PST ADULT - HISTORY OF PRESENT ILLNESS
77 year old female s/p right TKR at [facility] on 4/8/2022. S/P infection and  right total knee arthroplasty explantation with irrigation and debridement and placement of spacer 11/16/2022. She has complete her course of antibiotics 1/4/2023 and now presents for right revision total knee arthoplasty irrigation. and debridement.  77 year old female s/p right TKR at [facility] on 4/8/2022. S/P infection and  right total knee arthroplasty explantation with irrigation and debridement and placement of spacer 11/16/2022, was in rehab for treatment, now home. She has complete her course of antibiotics 1/4/2023 and now presents for right revision total knee arthoplasty irrigation. and debridement. PMH includes HTN, Hyperlipidemia, diabetes.   No recent covid exposure  Had covid in 2021

## 2023-03-08 NOTE — H&P PST ADULT - MUSCULOSKELETAL COMMENTS
able to hop around her home with a walker, non weight bearing right leg left knee brace in place, no pain

## 2023-03-08 NOTE — H&P PST ADULT - ASSESSMENT
Covid swab scheduled at [facility] on 3/17/2022  Full dentures  Airway class 1    CAPRINI VTE 2.0 SCORE [CLOT updated 2019]    AGE RELATED RISK FACTORS                                                       MOBILITY RELATED FACTORS  [ ] Age 41-60 years                                            (1 Point)                    [ ] Bed rest                                                        (1 Point)  [ ] Age: 61-74 years                                           (2 Points)                  [ ] Plaster cast                                                   (2 Points)  [x ] Age= 75 years                                              (3 Points)                    [ ] Bed bound for more than 72 hours                 (2 Points)    DISEASE RELATED RISK FACTORS                                               GENDER SPECIFIC FACTORS  [ ] Edema in the lower extremities                       (1 Point)              [ ] Pregnancy                                                     (1 Point)  [ ] Varicose veins                                               (1 Point)                     [ ] Post-partum < 6 weeks                                   (1 Point)             [x ] BMI > 25 Kg/m2                                            (1 Point)                     [ ] Hormonal therapy  or oral contraception          (1 Point)                 [ ] Sepsis (in the previous month)                        (1 Point)               [ ] History of pregnancy complications                 (1 point)  [ ] Pneumonia or serious lung disease                                               [ ] Unexplained or recurrent                     (1 Point)           (in the previous month)                               (1 Point)  [ ] Abnormal pulmonary function test                     (1 Point)                 SURGERY RELATED RISK FACTORS  [ ] Acute myocardial infarction                              (1 Point)               [ ]  Section                                             (1 Point)  [ ] Congestive heart failure (in the previous month)  (1 Point)      [ ] Minor surgery                                                  (1 Point)   [ ] Inflammatory bowel disease                             (1 Point)               [ ] Arthroscopic surgery                                        (2 Points)  [ ] Central venous access                                      (2 Points)                [ ] General surgery lasting more than 45 minutes (2 points)  [ ] Malignancy- Present or previous                   (2 Points)                [x ] Elective arthroplasty                                         (5 points)    [ ] Stroke (in the previous month)                          (5 Points)                                                                                                                                                           HEMATOLOGY RELATED FACTORS                                                 TRAUMA RELATED RISK FACTORS  [ ] Prior episodes of VTE                                     (3 Points)                [ ] Fracture of the hip, pelvis, or leg                       (5 Points)  [ ] Positive family history for VTE                         (3 Points)             [ ] Acute spinal cord injury (in the previous month)  (5 Points)  [ ] Prothrombin 10732 A                                     (3 Points)               [ ] Paralysis  (less than 1 month)                             (5 Points)  [ ] Factor V Leiden                                             (3 Points)                  [ ] Multiple Trauma within 1 month                        (5 Points)  [ ] Lupus anticoagulants                                     (3 Points)                                                           [ ] Anticardiolipin antibodies                               (3 Points)                                                       [ ] High homocysteine in the blood                      (3 Points)                                             [ ] Other congenital or acquired thrombophilia      (3 Points)                                                [ ] Heparin induced thrombocytopenia                  (3 Points)                                     Total Score [       8   ]

## 2023-03-08 NOTE — H&P PST ADULT - PROBLEM SELECTOR PLAN 1
r neph tube site right revision total knee arthroplasty, irrigation and debridement   Labs sent, scheduled for covid swab, MRSA/MSSA sent  Pre op instructions given

## 2023-03-08 NOTE — H&P PST ADULT - NSICDXPASTSURGICALHX_GEN_ALL_CORE_FT
PAST SURGICAL HISTORY:  H/O total knee replacement, right     History of cholecystectomy "years ago"     PAST SURGICAL HISTORY:  H/O total knee replacement, right     History of cholecystectomy "years ago"    History of removal of joint prosthesis of knee due to infection

## 2023-03-09 LAB
MRSA PCR RESULT.: SIGNIFICANT CHANGE UP
S AUREUS DNA NOSE QL NAA+PROBE: SIGNIFICANT CHANGE UP

## 2023-03-17 ENCOUNTER — OUTPATIENT (OUTPATIENT)
Dept: OUTPATIENT SERVICES | Facility: HOSPITAL | Age: 78
LOS: 1 days | End: 2023-03-17

## 2023-03-17 DIAGNOSIS — Z11.52 ENCOUNTER FOR SCREENING FOR COVID-19: ICD-10-CM

## 2023-03-17 DIAGNOSIS — Z98.890 OTHER SPECIFIED POSTPROCEDURAL STATES: Chronic | ICD-10-CM

## 2023-03-17 DIAGNOSIS — Z90.49 ACQUIRED ABSENCE OF OTHER SPECIFIED PARTS OF DIGESTIVE TRACT: Chronic | ICD-10-CM

## 2023-03-17 DIAGNOSIS — Z96.651 PRESENCE OF RIGHT ARTIFICIAL KNEE JOINT: Chronic | ICD-10-CM

## 2023-03-20 ENCOUNTER — TRANSCRIPTION ENCOUNTER (OUTPATIENT)
Age: 78
End: 2023-03-20

## 2023-03-20 ENCOUNTER — APPOINTMENT (OUTPATIENT)
Dept: ORTHOPEDIC SURGERY | Facility: HOSPITAL | Age: 78
End: 2023-03-20

## 2023-03-20 ENCOUNTER — APPOINTMENT (OUTPATIENT)
Dept: PLASTIC SURGERY | Facility: HOSPITAL | Age: 78
End: 2023-03-20
Payer: MEDICARE

## 2023-03-20 ENCOUNTER — INPATIENT (INPATIENT)
Facility: HOSPITAL | Age: 78
LOS: 6 days | Discharge: NURSING, REHAB OR LONG-TERM CARE | DRG: 467 | End: 2023-03-27
Attending: ORTHOPAEDIC SURGERY | Admitting: ORTHOPAEDIC SURGERY
Payer: MEDICARE

## 2023-03-20 VITALS
OXYGEN SATURATION: 98 % | DIASTOLIC BLOOD PRESSURE: 56 MMHG | HEART RATE: 47 BPM | WEIGHT: 130.07 LBS | HEIGHT: 61 IN | SYSTOLIC BLOOD PRESSURE: 155 MMHG | TEMPERATURE: 98 F | RESPIRATION RATE: 26 BRPM

## 2023-03-20 DIAGNOSIS — Z96.651 PRESENCE OF RIGHT ARTIFICIAL KNEE JOINT: Chronic | ICD-10-CM

## 2023-03-20 DIAGNOSIS — Z98.890 OTHER SPECIFIED POSTPROCEDURAL STATES: Chronic | ICD-10-CM

## 2023-03-20 DIAGNOSIS — T84.53XD INFECTION AND INFLAMMATORY REACTION DUE TO INTERNAL RIGHT KNEE PROSTHESIS, SUBSEQUENT ENCOUNTER: ICD-10-CM

## 2023-03-20 DIAGNOSIS — T84.50XA INFECTION AND INFLAMMATORY REACTION DUE TO UNSPECIFIED INTERNAL JOINT PROSTHESIS, INITIAL ENCOUNTER: ICD-10-CM

## 2023-03-20 DIAGNOSIS — Z01.818 ENCOUNTER FOR OTHER PREPROCEDURAL EXAMINATION: ICD-10-CM

## 2023-03-20 DIAGNOSIS — Z90.49 ACQUIRED ABSENCE OF OTHER SPECIFIED PARTS OF DIGESTIVE TRACT: Chronic | ICD-10-CM

## 2023-03-20 LAB
HCT VFR BLD CALC: 28.5 % — LOW (ref 34.5–45)
HGB BLD-MCNC: 9.5 G/DL — LOW (ref 11.5–15.5)
MCHC RBC-ENTMCNC: 31 PG — SIGNIFICANT CHANGE UP (ref 27–34)
MCHC RBC-ENTMCNC: 33.3 GM/DL — SIGNIFICANT CHANGE UP (ref 32–36)
MCV RBC AUTO: 93.1 FL — SIGNIFICANT CHANGE UP (ref 80–100)
NRBC # BLD: 0 /100 WBCS — SIGNIFICANT CHANGE UP (ref 0–0)
PLATELET # BLD AUTO: 175 K/UL — SIGNIFICANT CHANGE UP (ref 150–400)
RBC # BLD: 3.06 M/UL — LOW (ref 3.8–5.2)
RBC # FLD: 12.7 % — SIGNIFICANT CHANGE UP (ref 10.3–14.5)
WBC # BLD: 15.47 K/UL — HIGH (ref 3.8–10.5)
WBC # FLD AUTO: 15.47 K/UL — HIGH (ref 3.8–10.5)

## 2023-03-20 PROCEDURE — 27447 TOTAL KNEE ARTHROPLASTY: CPT | Mod: RT,22

## 2023-03-20 PROCEDURE — 15100 SPLT AGRFT T/A/L 1ST 100SQCM: CPT

## 2023-03-20 PROCEDURE — 27447 TOTAL KNEE ARTHROPLASTY: CPT | Mod: 82,RT,22

## 2023-03-20 PROCEDURE — 11982 REMOVE DRUG IMPLANT DEVICE: CPT | Mod: RT

## 2023-03-20 PROCEDURE — 11982 REMOVE DRUG IMPLANT DEVICE: CPT | Mod: 82,RT

## 2023-03-20 PROCEDURE — 99221 1ST HOSP IP/OBS SF/LOW 40: CPT

## 2023-03-20 PROCEDURE — 27310 EXPLORATION OF KNEE JOINT: CPT | Mod: RT,59

## 2023-03-20 PROCEDURE — 27310 EXPLORATION OF KNEE JOINT: CPT | Mod: 82,59,RT

## 2023-03-20 PROCEDURE — 73560 X-RAY EXAM OF KNEE 1 OR 2: CPT | Mod: 26,RT

## 2023-03-20 PROCEDURE — 27487 REVISE/REPLACE KNEE JOINT: CPT | Mod: RT,22

## 2023-03-20 PROCEDURE — 15738 MUSCLE-SKIN GRAFT LEG: CPT

## 2023-03-20 RX ORDER — FOLIC ACID 0.8 MG
1 TABLET ORAL DAILY
Refills: 0 | Status: DISCONTINUED | OUTPATIENT
Start: 2023-03-20 | End: 2023-03-27

## 2023-03-20 RX ORDER — PANTOPRAZOLE SODIUM 20 MG/1
40 TABLET, DELAYED RELEASE ORAL
Refills: 0 | Status: DISCONTINUED | OUTPATIENT
Start: 2023-03-20 | End: 2023-03-27

## 2023-03-20 RX ORDER — ONDANSETRON 8 MG/1
4 TABLET, FILM COATED ORAL EVERY 6 HOURS
Refills: 0 | Status: DISCONTINUED | OUTPATIENT
Start: 2023-03-20 | End: 2023-03-27

## 2023-03-20 RX ORDER — DEXTROSE 50 % IN WATER 50 %
25 SYRINGE (ML) INTRAVENOUS ONCE
Refills: 0 | Status: DISCONTINUED | OUTPATIENT
Start: 2023-03-20 | End: 2023-03-27

## 2023-03-20 RX ORDER — ASCORBIC ACID 60 MG
500 TABLET,CHEWABLE ORAL
Refills: 0 | Status: DISCONTINUED | OUTPATIENT
Start: 2023-03-20 | End: 2023-03-27

## 2023-03-20 RX ORDER — MAGNESIUM HYDROXIDE 400 MG/1
30 TABLET, CHEWABLE ORAL DAILY
Refills: 0 | Status: DISCONTINUED | OUTPATIENT
Start: 2023-03-20 | End: 2023-03-27

## 2023-03-20 RX ORDER — ACETAMINOPHEN 500 MG
1000 TABLET ORAL ONCE
Refills: 0 | Status: COMPLETED | OUTPATIENT
Start: 2023-03-21 | End: 2023-03-21

## 2023-03-20 RX ORDER — INSULIN LISPRO 100/ML
VIAL (ML) SUBCUTANEOUS AT BEDTIME
Refills: 0 | Status: DISCONTINUED | OUTPATIENT
Start: 2023-03-20 | End: 2023-03-27

## 2023-03-20 RX ORDER — ONDANSETRON 8 MG/1
4 TABLET, FILM COATED ORAL ONCE
Refills: 0 | Status: DISCONTINUED | OUTPATIENT
Start: 2023-03-20 | End: 2023-03-21

## 2023-03-20 RX ORDER — HYDROMORPHONE HYDROCHLORIDE 2 MG/ML
0.5 INJECTION INTRAMUSCULAR; INTRAVENOUS; SUBCUTANEOUS ONCE
Refills: 0 | Status: DISCONTINUED | OUTPATIENT
Start: 2023-03-20 | End: 2023-03-27

## 2023-03-20 RX ORDER — ACETAMINOPHEN 500 MG
975 TABLET ORAL EVERY 8 HOURS
Refills: 0 | Status: DISCONTINUED | OUTPATIENT
Start: 2023-03-22 | End: 2023-03-27

## 2023-03-20 RX ORDER — OXYCODONE HYDROCHLORIDE 5 MG/1
10 TABLET ORAL EVERY 4 HOURS
Refills: 0 | Status: DISCONTINUED | OUTPATIENT
Start: 2023-03-20 | End: 2023-03-27

## 2023-03-20 RX ORDER — INSULIN LISPRO 100/ML
VIAL (ML) SUBCUTANEOUS
Refills: 0 | Status: DISCONTINUED | OUTPATIENT
Start: 2023-03-20 | End: 2023-03-27

## 2023-03-20 RX ORDER — TRAMADOL HYDROCHLORIDE 50 MG/1
50 TABLET ORAL EVERY 6 HOURS
Refills: 0 | Status: DISCONTINUED | OUTPATIENT
Start: 2023-03-20 | End: 2023-03-26

## 2023-03-20 RX ORDER — ATORVASTATIN CALCIUM 80 MG/1
40 TABLET, FILM COATED ORAL AT BEDTIME
Refills: 0 | Status: DISCONTINUED | OUTPATIENT
Start: 2023-03-20 | End: 2023-03-27

## 2023-03-20 RX ORDER — APIXABAN 2.5 MG/1
2.5 TABLET, FILM COATED ORAL
Refills: 0 | Status: DISCONTINUED | OUTPATIENT
Start: 2023-03-21 | End: 2023-03-27

## 2023-03-20 RX ORDER — SENNA PLUS 8.6 MG/1
2 TABLET ORAL AT BEDTIME
Refills: 0 | Status: DISCONTINUED | OUTPATIENT
Start: 2023-03-20 | End: 2023-03-27

## 2023-03-20 RX ORDER — AMLODIPINE BESYLATE 2.5 MG/1
5 TABLET ORAL DAILY
Refills: 0 | Status: DISCONTINUED | OUTPATIENT
Start: 2023-03-20 | End: 2023-03-21

## 2023-03-20 RX ORDER — SODIUM CHLORIDE 9 MG/ML
500 INJECTION INTRAMUSCULAR; INTRAVENOUS; SUBCUTANEOUS ONCE
Refills: 0 | Status: COMPLETED | OUTPATIENT
Start: 2023-03-20 | End: 2023-03-21

## 2023-03-20 RX ORDER — DEXTROSE 50 % IN WATER 50 %
15 SYRINGE (ML) INTRAVENOUS ONCE
Refills: 0 | Status: DISCONTINUED | OUTPATIENT
Start: 2023-03-20 | End: 2023-03-27

## 2023-03-20 RX ORDER — VANCOMYCIN HCL 1 G
1000 VIAL (EA) INTRAVENOUS ONCE
Refills: 0 | Status: COMPLETED | OUTPATIENT
Start: 2023-03-20 | End: 2023-03-20

## 2023-03-20 RX ORDER — METOPROLOL TARTRATE 50 MG
100 TABLET ORAL DAILY
Refills: 0 | Status: DISCONTINUED | OUTPATIENT
Start: 2023-03-20 | End: 2023-03-21

## 2023-03-20 RX ORDER — CEFAZOLIN SODIUM 1 G
2000 VIAL (EA) INJECTION EVERY 8 HOURS
Refills: 0 | Status: DISCONTINUED | OUTPATIENT
Start: 2023-03-20 | End: 2023-03-24

## 2023-03-20 RX ORDER — POLYETHYLENE GLYCOL 3350 17 G/17G
17 POWDER, FOR SOLUTION ORAL AT BEDTIME
Refills: 0 | Status: DISCONTINUED | OUTPATIENT
Start: 2023-03-20 | End: 2023-03-27

## 2023-03-20 RX ORDER — OXYCODONE HYDROCHLORIDE 5 MG/1
5 TABLET ORAL EVERY 4 HOURS
Refills: 0 | Status: DISCONTINUED | OUTPATIENT
Start: 2023-03-20 | End: 2023-03-27

## 2023-03-20 RX ORDER — SODIUM CHLORIDE 9 MG/ML
500 INJECTION INTRAMUSCULAR; INTRAVENOUS; SUBCUTANEOUS ONCE
Refills: 0 | Status: COMPLETED | OUTPATIENT
Start: 2023-03-20 | End: 2023-03-20

## 2023-03-20 RX ORDER — DEXTROSE 50 % IN WATER 50 %
12.5 SYRINGE (ML) INTRAVENOUS ONCE
Refills: 0 | Status: DISCONTINUED | OUTPATIENT
Start: 2023-03-20 | End: 2023-03-27

## 2023-03-20 RX ORDER — DIPHENHYDRAMINE HCL 50 MG
25 CAPSULE ORAL ONCE
Refills: 0 | Status: COMPLETED | OUTPATIENT
Start: 2023-03-20 | End: 2023-03-20

## 2023-03-20 RX ORDER — GLUCAGON INJECTION, SOLUTION 0.5 MG/.1ML
1 INJECTION, SOLUTION SUBCUTANEOUS ONCE
Refills: 0 | Status: DISCONTINUED | OUTPATIENT
Start: 2023-03-20 | End: 2023-03-27

## 2023-03-20 RX ORDER — SODIUM CHLORIDE 9 MG/ML
1000 INJECTION, SOLUTION INTRAVENOUS
Refills: 0 | Status: DISCONTINUED | OUTPATIENT
Start: 2023-03-20 | End: 2023-03-27

## 2023-03-20 RX ORDER — HYDROMORPHONE HYDROCHLORIDE 2 MG/ML
0.25 INJECTION INTRAMUSCULAR; INTRAVENOUS; SUBCUTANEOUS
Refills: 0 | Status: DISCONTINUED | OUTPATIENT
Start: 2023-03-20 | End: 2023-03-21

## 2023-03-20 RX ADMIN — SODIUM CHLORIDE 500 MILLILITER(S): 9 INJECTION INTRAMUSCULAR; INTRAVENOUS; SUBCUTANEOUS at 23:30

## 2023-03-20 RX ADMIN — TRAMADOL HYDROCHLORIDE 50 MILLIGRAM(S): 50 TABLET ORAL at 13:50

## 2023-03-20 RX ADMIN — PANTOPRAZOLE SODIUM 40 MILLIGRAM(S): 20 TABLET, DELAYED RELEASE ORAL at 13:32

## 2023-03-20 RX ADMIN — Medication 25 MILLIGRAM(S): at 16:00

## 2023-03-20 RX ADMIN — Medication 250 MILLIGRAM(S): at 14:34

## 2023-03-20 NOTE — BRIEF OPERATIVE NOTE - OPERATION/FINDINGS
Proximal fascia of thigh was closed primarily using 0-quill. Medial gastrocnemius flap was harvested and rotated over anterior tibia for wound coverage of right TKA. STSG taken from ipsilateral thigh applied to recipient site over muscle. Remaining incision closed with 3-0 monocryl and 2-0 monoderm quill. STSG recipient and donor site dressed with xeroform. Incision vac placed over the length of the wound/donor site.
Stage 2 Revision for R TKA. Explant of cement spacer with new implantation of revision components. 2 Tibia with cone. Femur with augments. Plastics closure with gastroc flap and skin graft

## 2023-03-20 NOTE — BRIEF OPERATIVE NOTE - NSICDXBRIEFPROCEDURE_GEN_ALL_CORE_FT
PROCEDURES:  Closure, wound, using gastrocnemius flap and skin graft application 20-Mar-2023 23:13:22  Jaycee Cooper

## 2023-03-21 DIAGNOSIS — E11.9 TYPE 2 DIABETES MELLITUS WITHOUT COMPLICATIONS: ICD-10-CM

## 2023-03-21 DIAGNOSIS — Z29.9 ENCOUNTER FOR PROPHYLACTIC MEASURES, UNSPECIFIED: ICD-10-CM

## 2023-03-21 DIAGNOSIS — I10 ESSENTIAL (PRIMARY) HYPERTENSION: ICD-10-CM

## 2023-03-21 LAB
ANION GAP SERPL CALC-SCNC: 11 MMOL/L — SIGNIFICANT CHANGE UP (ref 5–17)
ANION GAP SERPL CALC-SCNC: 14 MMOL/L — SIGNIFICANT CHANGE UP (ref 5–17)
BUN SERPL-MCNC: 18 MG/DL — SIGNIFICANT CHANGE UP (ref 7–23)
BUN SERPL-MCNC: 19 MG/DL — SIGNIFICANT CHANGE UP (ref 7–23)
CALCIUM SERPL-MCNC: 7.4 MG/DL — LOW (ref 8.4–10.5)
CALCIUM SERPL-MCNC: 7.7 MG/DL — LOW (ref 8.4–10.5)
CHLORIDE SERPL-SCNC: 100 MMOL/L — SIGNIFICANT CHANGE UP (ref 96–108)
CHLORIDE SERPL-SCNC: 104 MMOL/L — SIGNIFICANT CHANGE UP (ref 96–108)
CO2 SERPL-SCNC: 22 MMOL/L — SIGNIFICANT CHANGE UP (ref 22–31)
CO2 SERPL-SCNC: 24 MMOL/L — SIGNIFICANT CHANGE UP (ref 22–31)
CREAT SERPL-MCNC: 1.1 MG/DL — SIGNIFICANT CHANGE UP (ref 0.5–1.3)
CREAT SERPL-MCNC: 1.18 MG/DL — SIGNIFICANT CHANGE UP (ref 0.5–1.3)
EGFR: 47 ML/MIN/1.73M2 — LOW
EGFR: 51 ML/MIN/1.73M2 — LOW
GLUCOSE SERPL-MCNC: 194 MG/DL — HIGH (ref 70–99)
GLUCOSE SERPL-MCNC: 208 MG/DL — HIGH (ref 70–99)
GRAM STN FLD: SIGNIFICANT CHANGE UP
HCT VFR BLD CALC: 27.8 % — LOW (ref 34.5–45)
HGB BLD-MCNC: 9.1 G/DL — LOW (ref 11.5–15.5)
MCHC RBC-ENTMCNC: 31 PG — SIGNIFICANT CHANGE UP (ref 27–34)
MCHC RBC-ENTMCNC: 32.7 GM/DL — SIGNIFICANT CHANGE UP (ref 32–36)
MCV RBC AUTO: 94.6 FL — SIGNIFICANT CHANGE UP (ref 80–100)
NIGHT BLUE STAIN TISS: SIGNIFICANT CHANGE UP
NRBC # BLD: 0 /100 WBCS — SIGNIFICANT CHANGE UP (ref 0–0)
PLATELET # BLD AUTO: 161 K/UL — SIGNIFICANT CHANGE UP (ref 150–400)
POTASSIUM SERPL-MCNC: 4.2 MMOL/L — SIGNIFICANT CHANGE UP (ref 3.5–5.3)
POTASSIUM SERPL-MCNC: 5 MMOL/L — SIGNIFICANT CHANGE UP (ref 3.5–5.3)
POTASSIUM SERPL-SCNC: 4.2 MMOL/L — SIGNIFICANT CHANGE UP (ref 3.5–5.3)
POTASSIUM SERPL-SCNC: 5 MMOL/L — SIGNIFICANT CHANGE UP (ref 3.5–5.3)
RBC # BLD: 2.94 M/UL — LOW (ref 3.8–5.2)
RBC # FLD: 12.7 % — SIGNIFICANT CHANGE UP (ref 10.3–14.5)
SODIUM SERPL-SCNC: 137 MMOL/L — SIGNIFICANT CHANGE UP (ref 135–145)
SODIUM SERPL-SCNC: 138 MMOL/L — SIGNIFICANT CHANGE UP (ref 135–145)
SPECIMEN SOURCE: SIGNIFICANT CHANGE UP
WBC # BLD: 14.89 K/UL — HIGH (ref 3.8–10.5)
WBC # FLD AUTO: 14.89 K/UL — HIGH (ref 3.8–10.5)

## 2023-03-21 PROCEDURE — 99223 1ST HOSP IP/OBS HIGH 75: CPT

## 2023-03-21 RX ORDER — CALCIUM GLUCONATE 100 MG/ML
1 VIAL (ML) INTRAVENOUS ONCE
Refills: 0 | Status: COMPLETED | OUTPATIENT
Start: 2023-03-21 | End: 2023-03-21

## 2023-03-21 RX ORDER — AMLODIPINE BESYLATE 2.5 MG/1
5 TABLET ORAL DAILY
Refills: 0 | Status: DISCONTINUED | OUTPATIENT
Start: 2023-03-21 | End: 2023-03-27

## 2023-03-21 RX ORDER — SODIUM CHLORIDE 9 MG/ML
1000 INJECTION INTRAMUSCULAR; INTRAVENOUS; SUBCUTANEOUS
Refills: 0 | Status: DISCONTINUED | OUTPATIENT
Start: 2023-03-21 | End: 2023-03-25

## 2023-03-21 RX ORDER — METOPROLOL TARTRATE 50 MG
100 TABLET ORAL DAILY
Refills: 0 | Status: DISCONTINUED | OUTPATIENT
Start: 2023-03-21 | End: 2023-03-27

## 2023-03-21 RX ORDER — SODIUM CHLORIDE 9 MG/ML
500 INJECTION INTRAMUSCULAR; INTRAVENOUS; SUBCUTANEOUS ONCE
Refills: 0 | Status: COMPLETED | OUTPATIENT
Start: 2023-03-20 | End: 2023-03-21

## 2023-03-21 RX ORDER — PHENYLEPHRINE HYDROCHLORIDE 10 MG/ML
0.3 INJECTION INTRAVENOUS
Qty: 40 | Refills: 0 | Status: DISCONTINUED | OUTPATIENT
Start: 2023-03-21 | End: 2023-03-22

## 2023-03-21 RX ADMIN — OXYCODONE HYDROCHLORIDE 5 MILLIGRAM(S): 5 TABLET ORAL at 18:00

## 2023-03-21 RX ADMIN — Medication 500 MILLIGRAM(S): at 10:01

## 2023-03-21 RX ADMIN — Medication 1: at 06:40

## 2023-03-21 RX ADMIN — Medication 100 MILLIGRAM(S): at 04:38

## 2023-03-21 RX ADMIN — Medication 400 MILLIGRAM(S): at 20:20

## 2023-03-21 RX ADMIN — HYDROMORPHONE HYDROCHLORIDE 0.25 MILLIGRAM(S): 2 INJECTION INTRAMUSCULAR; INTRAVENOUS; SUBCUTANEOUS at 04:47

## 2023-03-21 RX ADMIN — Medication 1000 MILLIGRAM(S): at 03:30

## 2023-03-21 RX ADMIN — Medication 1000 MILLIGRAM(S): at 12:11

## 2023-03-21 RX ADMIN — PHENYLEPHRINE HYDROCHLORIDE 6.64 MICROGRAM(S)/KG/MIN: 10 INJECTION INTRAVENOUS at 04:39

## 2023-03-21 RX ADMIN — APIXABAN 2.5 MILLIGRAM(S): 2.5 TABLET, FILM COATED ORAL at 10:00

## 2023-03-21 RX ADMIN — Medication 1 TABLET(S): at 14:03

## 2023-03-21 RX ADMIN — SODIUM CHLORIDE 100 MILLILITER(S): 9 INJECTION INTRAMUSCULAR; INTRAVENOUS; SUBCUTANEOUS at 01:46

## 2023-03-21 RX ADMIN — PHENYLEPHRINE HYDROCHLORIDE 6.64 MICROGRAM(S)/KG/MIN: 10 INJECTION INTRAVENOUS at 00:05

## 2023-03-21 RX ADMIN — Medication 100 MILLIGRAM(S): at 14:03

## 2023-03-21 RX ADMIN — OXYCODONE HYDROCHLORIDE 5 MILLIGRAM(S): 5 TABLET ORAL at 07:29

## 2023-03-21 RX ADMIN — OXYCODONE HYDROCHLORIDE 5 MILLIGRAM(S): 5 TABLET ORAL at 07:11

## 2023-03-21 RX ADMIN — SENNA PLUS 2 TABLET(S): 8.6 TABLET ORAL at 21:59

## 2023-03-21 RX ADMIN — Medication 1: at 17:50

## 2023-03-21 RX ADMIN — Medication 400 MILLIGRAM(S): at 03:03

## 2023-03-21 RX ADMIN — Medication 2: at 11:18

## 2023-03-21 RX ADMIN — Medication 1 TABLET(S): at 11:45

## 2023-03-21 RX ADMIN — Medication 1 TABLET(S): at 21:59

## 2023-03-21 RX ADMIN — Medication 1 MILLIGRAM(S): at 17:20

## 2023-03-21 RX ADMIN — Medication 100 GRAM(S): at 07:59

## 2023-03-21 RX ADMIN — Medication 400 MILLIGRAM(S): at 12:05

## 2023-03-21 RX ADMIN — SODIUM CHLORIDE 1000 MILLILITER(S): 9 INJECTION INTRAMUSCULAR; INTRAVENOUS; SUBCUTANEOUS at 00:05

## 2023-03-21 RX ADMIN — SODIUM CHLORIDE 500 MILLILITER(S): 9 INJECTION INTRAMUSCULAR; INTRAVENOUS; SUBCUTANEOUS at 05:30

## 2023-03-21 RX ADMIN — Medication 1 TABLET(S): at 10:00

## 2023-03-21 RX ADMIN — HYDROMORPHONE HYDROCHLORIDE 0.25 MILLIGRAM(S): 2 INJECTION INTRAMUSCULAR; INTRAVENOUS; SUBCUTANEOUS at 05:03

## 2023-03-21 RX ADMIN — Medication 1000 MILLIGRAM(S): at 21:15

## 2023-03-21 RX ADMIN — SODIUM CHLORIDE 100 MILLILITER(S): 9 INJECTION INTRAMUSCULAR; INTRAVENOUS; SUBCUTANEOUS at 11:20

## 2023-03-21 RX ADMIN — SODIUM CHLORIDE 100 MILLILITER(S): 9 INJECTION INTRAMUSCULAR; INTRAVENOUS; SUBCUTANEOUS at 04:39

## 2023-03-21 RX ADMIN — APIXABAN 2.5 MILLIGRAM(S): 2.5 TABLET, FILM COATED ORAL at 17:15

## 2023-03-21 RX ADMIN — Medication 500 MILLIGRAM(S): at 17:16

## 2023-03-21 RX ADMIN — SODIUM CHLORIDE 100 MILLILITER(S): 9 INJECTION INTRAMUSCULAR; INTRAVENOUS; SUBCUTANEOUS at 05:30

## 2023-03-21 RX ADMIN — POLYETHYLENE GLYCOL 3350 17 GRAM(S): 17 POWDER, FOR SOLUTION ORAL at 21:59

## 2023-03-21 RX ADMIN — PANTOPRAZOLE SODIUM 40 MILLIGRAM(S): 20 TABLET, DELAYED RELEASE ORAL at 06:01

## 2023-03-21 RX ADMIN — ATORVASTATIN CALCIUM 40 MILLIGRAM(S): 80 TABLET, FILM COATED ORAL at 21:59

## 2023-03-21 RX ADMIN — SODIUM CHLORIDE 500 MILLILITER(S): 9 INJECTION INTRAMUSCULAR; INTRAVENOUS; SUBCUTANEOUS at 02:35

## 2023-03-21 RX ADMIN — OXYCODONE HYDROCHLORIDE 5 MILLIGRAM(S): 5 TABLET ORAL at 17:15

## 2023-03-21 RX ADMIN — Medication 100 MILLIGRAM(S): at 21:59

## 2023-03-21 NOTE — CONSULT NOTE ADULT - SUBJECTIVE AND OBJECTIVE BOX
HPI:  77F with h/o HTN, Hyperlipidemia, DMT2, OA s/p right TKR at [facility] on 4/8/2022. Complicated by infection and  right total knee arthroplasty explantation with irrigation and debridement and placement of spacer 11/16/2022. Pt was in [facility] for treatment then discharged home. She has completed her course of antibiotics 1/4/2023 and now presents for right revision total knee arthoplasty irrigation and debridement. s/p procedure 3/20 ; well tolerated. She states that right knee pain is fairly well controlled.   She denies cp, sob, n/v, fever/chills.        PAST MEDICAL & SURGICAL HISTORY:  HTN (hypertension)      Diabetes mellitus  recently started on Januvia      Hyperlipidemia      History of cholecystectomy  &quot;years ago&quot;      H/O total knee replacement, right      History of removal of joint prosthesis of knee due to infection          Review of Systems:   CONSTITUTIONAL: No fever, weight loss, or fatigue  RESPIRATORY: No cough, wheezing, chills or hemoptysis; No shortness of breath  CARDIOVASCULAR: No chest pain, palpitations, dizziness, or leg swelling  GASTROINTESTINAL: No abdominal or epigastric pain. No nausea, vomiting.  GENITOURINARY: No dysuria, frequency, hematuria, or incontinence  NEUROLOGICAL: No headaches, memory loss, loss of strength, numbness, or tremors  SKIN: No itching, burning, rashes, or lesions   ENDOCRINE: No heat or cold intolerance; No hair loss  MUSCULOSKELETAL: No joint pain or swelling; No muscle, back, or extremity pain  PSYCHIATRIC: No depression, anxiety,  HEME/LYMPH: No easy bruising, or bleeding gums      Allergies    [This allergen will not trigger allergy alert] solriamfetol hydrochloride (Unknown)  penicillins (Short breath (Severe))  vancomycin (Rash)    Intolerances        Social History: denies smoking/Etoh/IVDU    FAMILY HISTORY: no family hx of DMT2      MEDICATIONS  (STANDING):  acetaminophen   IVPB .. 1000 milliGRAM(s) IV Intermittent once  amLODIPine   Tablet 5 milliGRAM(s) Oral daily  apixaban 2.5 milliGRAM(s) Oral two times a day  ascorbic acid 500 milliGRAM(s) Oral two times a day  atorvastatin 40 milliGRAM(s) Oral at bedtime  calcium carbonate 1250 mG  + Vitamin D (OsCal 500 + D) 1 Tablet(s) Oral three times a day  ceFAZolin   IVPB 2000 milliGRAM(s) IV Intermittent every 8 hours  dextrose 5%. 1000 milliLiter(s) (100 mL/Hr) IV Continuous <Continuous>  dextrose 5%. 1000 milliLiter(s) (50 mL/Hr) IV Continuous <Continuous>  dextrose 50% Injectable 25 Gram(s) IV Push once  dextrose 50% Injectable 12.5 Gram(s) IV Push once  dextrose 50% Injectable 25 Gram(s) IV Push once  folic acid 1 milliGRAM(s) Oral daily  glucagon  Injectable 1 milliGRAM(s) IntraMuscular once  HYDROmorphone  Injectable 0.5 milliGRAM(s) IV Push once  insulin lispro (ADMELOG) corrective regimen sliding scale   SubCutaneous three times a day before meals  insulin lispro (ADMELOG) corrective regimen sliding scale   SubCutaneous at bedtime  metoprolol succinate  milliGRAM(s) Oral daily  multivitamin 1 Tablet(s) Oral daily  pantoprazole    Tablet 40 milliGRAM(s) Oral before breakfast  phenylephrine    Infusion 0.3 MICROgram(s)/kG/Min (6.64 mL/Hr) IV Continuous <Continuous>  polyethylene glycol 3350 17 Gram(s) Oral at bedtime  senna 2 Tablet(s) Oral at bedtime  sodium chloride 0.9%. 1000 milliLiter(s) (100 mL/Hr) IV Continuous <Continuous>    MEDICATIONS  (PRN):  aluminum hydroxide/magnesium hydroxide/simethicone Suspension 30 milliLiter(s) Oral four times a day PRN Indigestion  dextrose Oral Gel 15 Gram(s) Oral once PRN Blood Glucose LESS THAN 70 milliGRAM(s)/deciliter  magnesium hydroxide Suspension 30 milliLiter(s) Oral daily PRN Constipation  ondansetron Injectable 4 milliGRAM(s) IV Push every 6 hours PRN Nausea and/or Vomiting  oxyCODONE    IR 5 milliGRAM(s) Oral every 4 hours PRN Moderate Pain (4 - 6)  oxyCODONE    IR 10 milliGRAM(s) Oral every 4 hours PRN Severe Pain (7 - 10)  traMADol 50 milliGRAM(s) Oral every 6 hours PRN Mild Pain (1 - 3)      Vital Signs Last 24 Hrs  T(C): 36.8 (21 Mar 2023 15:10), Max: 36.8 (21 Mar 2023 15:10)  T(F): 98.3 (21 Mar 2023 15:10), Max: 98.3 (21 Mar 2023 15:10)  HR: 69 (21 Mar 2023 15:10) (58 - 98)  BP: 104/64 (21 Mar 2023 15:10) (77/40 - 118/56)  BP(mean): 83 (21 Mar 2023 14:50) (53 - 84)  RR: 18 (21 Mar 2023 15:10) (14 - 18)  SpO2: 97% (21 Mar 2023 15:10) (94% - 100%)    Parameters below as of 21 Mar 2023 15:10  Patient On (Oxygen Delivery Method): room air      CAPILLARY BLOOD GLUCOSE      POCT Blood Glucose.: 234 mg/dL (21 Mar 2023 11:05)  POCT Blood Glucose.: 194 mg/dL (21 Mar 2023 06:14)  POCT Blood Glucose.: 176 mg/dL (20 Mar 2023 23:25)    I&O's Summary    20 Mar 2023 07:01  -  21 Mar 2023 07:00  --------------------------------------------------------  IN: 2927 mL / OUT: 1380 mL / NET: 1547 mL    21 Mar 2023 07:01  -  21 Mar 2023 15:24  --------------------------------------------------------  IN: 1570 mL / OUT: 990 mL / NET: 580 mL        PHYSICAL EXAM:  GENERAL: NAD, well-groomed  HEAD:  Atraumatic, Normocephalic  EYES:  conjunctiva and sclera clear  NECK: Supple, No JVD  CHEST/LUNG: Clear to auscultation bilaterally; No wheeze  HEART: Regular rate and rhythm; No murmurs, rubs, or gallops  ABDOMEN: Soft, Nontender, Nondistended; Bowel sounds present  EXTREMITIES:  Right knee dressing CDI, drain in place  PSYCH: AAOx3  NEUROLOGY: non-focal  SKIN: No rashes or lesions    LABS:                        9.1    14.89 )-----------( 161      ( 21 Mar 2023 02:55 )             27.8     03-21    137  |  104  |  18  ----------------------------<  208<H>  5.0   |  22  |  1.10    Ca    7.4<L>      21 Mar 2023 02:55                RADIOLOGY & ADDITIONAL TESTS:    Imaging Personally Reviewed:  [facility] XR 3/20  Unconstrained longstem revision right total knee prosthesis implanted.    Intact and aligned hardware and no periprosthetic fractures.    Postoperative soft tissue changes.    Few proximal medial calf surgical clips noted. Wound VAC suction device   also present over anterior knee.        
ASTRID KILPATRICK 77y Female  MRN-[id number]    Patient is a 77y old  Female who presents with a chief complaint of having my right knee fixed  GOC: so I can walk again (08 Mar 2023 15:56)      HPI:  77 year old female s/p right TKR at [facility] on 4/8/2022. S/P infection and  right total knee arthroplasty explantation with irrigation and debridement and placement of spacer 11/16/2022, was in rehab for treatment, now home. She has complete her course of antibiotics 1/4/2023 and now presents for right revision total knee arthoplasty irrigation. and debridement. PMH includes HTN, Hyperlipidemia, diabetes.   No recent covid exposure  Had covid in 2021 (08 Mar 2023 15:56)    Not on abx prior ro current admission     PAST MEDICAL & SURGICAL HISTORY:  HTN (hypertension)      Diabetes mellitus  recently started on Januvia      Hyperlipidemia      History of cholecystectomy  &quot;years ago&quot;      H/O total knee replacement, right      History of removal of joint prosthesis of knee due to infection          Allergies    [This allergen will not trigger allergy alert] solriamfetol hydrochloride (Unknown)  penicillins (Short breath (Severe))  vancomycin (Rash)    Intolerances        ANTIMICROBIALS:  vancomycin  IVPB 1000 once      MEDICATIONS  (STANDING):  chlorhexidine 2% Cloths 1 Application(s) Topical once  lidocaine 1% Injectable 0.2 milliLiter(s) Local Injection once  sodium chloride 0.9% lock flush 3 milliLiter(s) IV Push every 8 hours  vancomycin  IVPB 1000 milliGRAM(s) IV Intermittent once      Social History  Smoking: no   Etoh: no  Drug use: no      FAMILY HISTORY: no h/o PJI      Vital Signs Last 24 Hrs  T(C): 36.5 (20 Mar 2023 12:25), Max: 36.5 (20 Mar 2023 11:25)  T(F): 97.7 (20 Mar 2023 11:25), Max: 97.7 (20 Mar 2023 11:25)  HR: 47 (20 Mar 2023 12:25) (47 - 47)  BP: 155/56 (20 Mar 2023 12:25) (155/56 - 155/56)  BP(mean): --  RR: 26 (20 Mar 2023 12:25) (26 - 26)  SpO2: 98% (20 Mar 2023 12:25) (98% - 98%)        MICROBIOLOGY:      RADIOLOGY

## 2023-03-21 NOTE — CONSULT NOTE ADULT - PROBLEM SELECTOR RECOMMENDATION 2
- Hb A1C 6.4 ; on Januvia 100mg po daily ; hold while inpatient  - MAME  - FS QAC QHS  - Carb- consistent diet

## 2023-03-21 NOTE — PHYSICAL THERAPY INITIAL EVALUATION ADULT - GAIT DEVIATIONS NOTED, PT EVAL
decreased crissy/increased time in double stance/decreased velocity of limb motion/decreased step length/decreased stride length/decreased weight-shifting ability

## 2023-03-21 NOTE — OCCUPATIONAL THERAPY INITIAL EVALUATION ADULT - PERTINENT HX OF CURRENT PROBLEM, REHAB EVAL
77 year old female s/p right TKR at [facility] on 4/8/2022. S/P infection and  right total knee arthroplasty explantation with irrigation and debridement and placement of spacer 11/16/2022, was in rehab for treatment, now home. She has complete her course of antibiotics 1/4/2023 and now presents for right revision total knee arthoplasty irrigation. and debridement. PMH includes HTN, Hyperlipidemia, diabetes. Pt is now s/p stage 2 Revision for R TKA 3/20/23.    X-Ray Right Knee (3/20/23): IMPRESSION: Unconstrained longstem revision right total knee prosthesis implanted. Intact and aligned hardware and no periprosthetic fractures. Postoperative soft tissue changes. Few proximal medial calf surgical clips noted. Wound VAC suction device also present over anterior knee. Correlate with intraoperative findings.

## 2023-03-21 NOTE — PHYSICAL THERAPY INITIAL EVALUATION ADULT - ACTIVE RANGE OF MOTION EXAMINATION, REHAB EVAL
RT knee in KI/bilateral upper extremity Active ROM was WFL (within functional limits)/Left LE Active ROM was WFL (within functional limits)

## 2023-03-21 NOTE — PHYSICAL THERAPY INITIAL EVALUATION ADULT - NSPTDMEREC_GEN_A_CORE
If patient goes home, would benefit from HPT, would need assistance w/all ADLs and transfers, would need transport chair for long distance; bedside commode 2/2 difficulty ambulating more than 2-3 ft. 2/2 pain and muscle weakness in RLE TBD at [facility]; If patient goes home, would benefit from HPT, would need assistance w/all ADLs and transfers, would need transport chair for long distance; bedside commode 2/2 difficulty ambulating more than 2-3 ft. 2/2 pain and muscle weakness in RLE

## 2023-03-21 NOTE — PHYSICAL THERAPY INITIAL EVALUATION ADULT - PERTINENT HX OF CURRENT PROBLEM, REHAB EVAL
77 year old female s/p right TKR at [facility] on 4/8/2022. S/P infection and  right total knee arthroplasty explantation with irrigation and debridement and placement of spacer 11/16/2022, was in rehab for treatment, now home. She has complete her course of antibiotics 1/4/2023 and now presents for right revision total knee arthoplasty irrigation. and debridement. PMH includes HTN, Hyperlipidemia, diabetes. Hospital course: s/p RT revision TKA 3/21

## 2023-03-21 NOTE — CONSULT NOTE ADULT - PROBLEM SELECTOR RECOMMENDATION 9
-better  -for surgery today - knee arthroplasty  -stable off abx  -postop abx - give Ancef 2 gm iv q8 given h/o mssa - tolerated Ancef as outpt   -recent outpt joint cultures were negative   -defer ortho plans per surgical team
- s/p right revision total knee arthoplasty irrigation. and debridement 3/20  - well tolerated  - pain control per primary team  - c/w Ancef 2 gm iv q8 given h/o mssa   - ID following  -recent outpt joint cultures were negative

## 2023-03-21 NOTE — CONSULT NOTE ADULT - ASSESSMENT
77F with h/o HTN, Hyperlipidemia, DMT2, OA s/p right TKR at [facility] on 4/8/2022. Complicated by infection and  right total knee arthroplasty explantation with irrigation and debridement and placement of spacer 11/16/2022. Pt was in [facility] for treatment then discharged home. She has completed her course of antibiotics 1/4/2023 and now presents for right revision total knee arthoplasty irrigation and debridement. s/p procedure 3/20 ;
77 year old female s/p right TKR at [facility] on 4/8/2022. S/P infection and  right total knee arthroplasty explantation with irrigation and debridement and placement of spacer 11/16/2022, was in rehab for treatment, now home. She has complete her course of antibiotics 1/4/2023 and now presents for right revision total knee arthoplasty irrigation. and debridement.    John Holley  Attending Physician   Division of Infectious Disease  Office #[phone]  Available on [employer] Teams also  After 5pm/weekend or no response, call #[phone]

## 2023-03-21 NOTE — CONSULT NOTE ADULT - PROBLEM SELECTOR RECOMMENDATION 3
- home meds are Amlodipine 5mg po daily and Metoprolol ER 100mg po daily  - BP low normal  - can continue home meds but with hold parameters ( hold for SBP < 110)

## 2023-03-21 NOTE — PHYSICAL THERAPY INITIAL EVALUATION ADULT - ADDITIONAL COMMENTS
Patient lives with her  in 2 story private house; 3-4 stairs w/B/L HR to enter and first floor set up; pt. reports she was independent in ADLs w/RW prior to TKA infection; reports she wants to go to [facility] to restore her previous level of function

## 2023-03-21 NOTE — PHYSICAL THERAPY INITIAL EVALUATION ADULT - PLANNED THERAPY INTERVENTIONS, PT EVAL
GOAL: Patient will be able to negotiate 6 steps in 2 weeks/bed mobility training/gait training/strengthening/transfer training

## 2023-03-21 NOTE — OCCUPATIONAL THERAPY INITIAL EVALUATION ADULT - GENERAL OBSERVATIONS, REHAB EVAL
Pt. received  in bed, NAD, +IVL, +sosa, +ELOISA drain RT knee, +KI Pt. received semi-supine in bed, NAD, +IVL, +sosa, +ELOISA drain RT knee, +wvac, +KI

## 2023-03-22 ENCOUNTER — TRANSCRIPTION ENCOUNTER (OUTPATIENT)
Age: 78
End: 2023-03-22

## 2023-03-22 LAB
ANION GAP SERPL CALC-SCNC: 9 MMOL/L — SIGNIFICANT CHANGE UP (ref 5–17)
BLD GP AB SCN SERPL QL: NEGATIVE — SIGNIFICANT CHANGE UP
BUN SERPL-MCNC: 21 MG/DL — SIGNIFICANT CHANGE UP (ref 7–23)
CALCIUM SERPL-MCNC: 7.9 MG/DL — LOW (ref 8.4–10.5)
CHLORIDE SERPL-SCNC: 107 MMOL/L — SIGNIFICANT CHANGE UP (ref 96–108)
CO2 SERPL-SCNC: 22 MMOL/L — SIGNIFICANT CHANGE UP (ref 22–31)
CREAT SERPL-MCNC: 1.17 MG/DL — SIGNIFICANT CHANGE UP (ref 0.5–1.3)
EGFR: 47 ML/MIN/1.73M2 — LOW
GLUCOSE SERPL-MCNC: 162 MG/DL — HIGH (ref 70–99)
HCT VFR BLD CALC: 20.7 % — CRITICAL LOW (ref 34.5–45)
HCT VFR BLD CALC: 20.7 % — CRITICAL LOW (ref 34.5–45)
HCT VFR BLD CALC: 23.6 % — LOW (ref 34.5–45)
HGB BLD-MCNC: 6.8 G/DL — CRITICAL LOW (ref 11.5–15.5)
HGB BLD-MCNC: 6.9 G/DL — CRITICAL LOW (ref 11.5–15.5)
HGB BLD-MCNC: 7.9 G/DL — LOW (ref 11.5–15.5)
MCHC RBC-ENTMCNC: 30.8 PG — SIGNIFICANT CHANGE UP (ref 27–34)
MCHC RBC-ENTMCNC: 31.2 PG — SIGNIFICANT CHANGE UP (ref 27–34)
MCHC RBC-ENTMCNC: 31.5 PG — SIGNIFICANT CHANGE UP (ref 27–34)
MCHC RBC-ENTMCNC: 32.9 GM/DL — SIGNIFICANT CHANGE UP (ref 32–36)
MCHC RBC-ENTMCNC: 33.3 GM/DL — SIGNIFICANT CHANGE UP (ref 32–36)
MCHC RBC-ENTMCNC: 33.5 GM/DL — SIGNIFICANT CHANGE UP (ref 32–36)
MCV RBC AUTO: 93.7 FL — SIGNIFICANT CHANGE UP (ref 80–100)
MCV RBC AUTO: 93.7 FL — SIGNIFICANT CHANGE UP (ref 80–100)
MCV RBC AUTO: 94 FL — SIGNIFICANT CHANGE UP (ref 80–100)
NRBC # BLD: 0 /100 WBCS — SIGNIFICANT CHANGE UP (ref 0–0)
PLATELET # BLD AUTO: 131 K/UL — LOW (ref 150–400)
PLATELET # BLD AUTO: 142 K/UL — LOW (ref 150–400)
PLATELET # BLD AUTO: 145 K/UL — LOW (ref 150–400)
POTASSIUM SERPL-MCNC: 4.4 MMOL/L — SIGNIFICANT CHANGE UP (ref 3.5–5.3)
POTASSIUM SERPL-SCNC: 4.4 MMOL/L — SIGNIFICANT CHANGE UP (ref 3.5–5.3)
RBC # BLD: 2.21 M/UL — LOW (ref 3.8–5.2)
RBC # BLD: 2.21 M/UL — LOW (ref 3.8–5.2)
RBC # BLD: 2.51 M/UL — LOW (ref 3.8–5.2)
RBC # FLD: 13.2 % — SIGNIFICANT CHANGE UP (ref 10.3–14.5)
RBC # FLD: 13.4 % — SIGNIFICANT CHANGE UP (ref 10.3–14.5)
RBC # FLD: 13.5 % — SIGNIFICANT CHANGE UP (ref 10.3–14.5)
RH IG SCN BLD-IMP: POSITIVE — SIGNIFICANT CHANGE UP
SODIUM SERPL-SCNC: 138 MMOL/L — SIGNIFICANT CHANGE UP (ref 135–145)
WBC # BLD: 12.53 K/UL — HIGH (ref 3.8–10.5)
WBC # BLD: 12.66 K/UL — HIGH (ref 3.8–10.5)
WBC # BLD: 13.43 K/UL — HIGH (ref 3.8–10.5)
WBC # FLD AUTO: 12.53 K/UL — HIGH (ref 3.8–10.5)
WBC # FLD AUTO: 12.66 K/UL — HIGH (ref 3.8–10.5)
WBC # FLD AUTO: 13.43 K/UL — HIGH (ref 3.8–10.5)

## 2023-03-22 PROCEDURE — 99232 SBSQ HOSP IP/OBS MODERATE 35: CPT

## 2023-03-22 RX ORDER — FUROSEMIDE 40 MG
20 TABLET ORAL DAILY
Refills: 0 | Status: DISCONTINUED | OUTPATIENT
Start: 2023-03-22 | End: 2023-03-27

## 2023-03-22 RX ADMIN — ATORVASTATIN CALCIUM 40 MILLIGRAM(S): 80 TABLET, FILM COATED ORAL at 21:07

## 2023-03-22 RX ADMIN — Medication 1: at 11:55

## 2023-03-22 RX ADMIN — Medication 1 TABLET(S): at 11:19

## 2023-03-22 RX ADMIN — Medication 100 MILLIGRAM(S): at 05:22

## 2023-03-22 RX ADMIN — Medication 1 TABLET(S): at 21:06

## 2023-03-22 RX ADMIN — Medication 100 MILLIGRAM(S): at 21:06

## 2023-03-22 RX ADMIN — PANTOPRAZOLE SODIUM 40 MILLIGRAM(S): 20 TABLET, DELAYED RELEASE ORAL at 06:13

## 2023-03-22 RX ADMIN — Medication 975 MILLIGRAM(S): at 05:10

## 2023-03-22 RX ADMIN — Medication 975 MILLIGRAM(S): at 13:50

## 2023-03-22 RX ADMIN — OXYCODONE HYDROCHLORIDE 10 MILLIGRAM(S): 5 TABLET ORAL at 10:54

## 2023-03-22 RX ADMIN — Medication 975 MILLIGRAM(S): at 05:40

## 2023-03-22 RX ADMIN — Medication 500 MILLIGRAM(S): at 17:20

## 2023-03-22 RX ADMIN — Medication 975 MILLIGRAM(S): at 21:09

## 2023-03-22 RX ADMIN — Medication 100 MILLIGRAM(S): at 13:01

## 2023-03-22 RX ADMIN — OXYCODONE HYDROCHLORIDE 10 MILLIGRAM(S): 5 TABLET ORAL at 18:50

## 2023-03-22 RX ADMIN — Medication 975 MILLIGRAM(S): at 13:00

## 2023-03-22 RX ADMIN — SENNA PLUS 2 TABLET(S): 8.6 TABLET ORAL at 21:07

## 2023-03-22 RX ADMIN — Medication 975 MILLIGRAM(S): at 21:39

## 2023-03-22 RX ADMIN — Medication 100 MILLIGRAM(S): at 05:11

## 2023-03-22 RX ADMIN — OXYCODONE HYDROCHLORIDE 10 MILLIGRAM(S): 5 TABLET ORAL at 19:20

## 2023-03-22 RX ADMIN — APIXABAN 2.5 MILLIGRAM(S): 2.5 TABLET, FILM COATED ORAL at 05:10

## 2023-03-22 RX ADMIN — APIXABAN 2.5 MILLIGRAM(S): 2.5 TABLET, FILM COATED ORAL at 17:20

## 2023-03-22 RX ADMIN — OXYCODONE HYDROCHLORIDE 10 MILLIGRAM(S): 5 TABLET ORAL at 00:14

## 2023-03-22 RX ADMIN — Medication 1: at 08:07

## 2023-03-22 RX ADMIN — OXYCODONE HYDROCHLORIDE 10 MILLIGRAM(S): 5 TABLET ORAL at 00:44

## 2023-03-22 RX ADMIN — Medication 1 TABLET(S): at 13:01

## 2023-03-22 RX ADMIN — Medication 500 MILLIGRAM(S): at 05:10

## 2023-03-22 RX ADMIN — Medication 1: at 18:04

## 2023-03-22 RX ADMIN — Medication 1 TABLET(S): at 05:09

## 2023-03-22 RX ADMIN — OXYCODONE HYDROCHLORIDE 10 MILLIGRAM(S): 5 TABLET ORAL at 23:36

## 2023-03-22 RX ADMIN — Medication 20 MILLIGRAM(S): at 21:06

## 2023-03-22 RX ADMIN — Medication 1 MILLIGRAM(S): at 11:18

## 2023-03-22 RX ADMIN — OXYCODONE HYDROCHLORIDE 10 MILLIGRAM(S): 5 TABLET ORAL at 11:32

## 2023-03-22 NOTE — DISCHARGE NOTE PROVIDER - NSDCMRMEDTOKEN_GEN_ALL_CORE_FT
amLODIPine 5 mg oral tablet: 1 tab(s) orally once a day  yajaira brace: s/p revision right total knee replacement   KRISTY = 99m  Januvia 100 mg oral tablet: 1 tab(s) orally once a day  Metoprolol Succinate  mg oral tablet, extended release: 1 tab(s) orally once a day  rosuvastatin 40 mg oral tablet: 1 tab(s) orally once a day   acetaminophen 325 mg oral tablet: 3 tab(s) orally every 8 hours  aluminum hydroxide-magnesium hydroxide 200 mg-200 mg/5 mL oral suspension: 30 milliliter(s) orally 4 times a day As needed Indigestion  amLODIPine 5 mg oral tablet: 1 tab(s) orally once a day  apixaban 2.5 mg oral tablet: 1 tab(s) orally 2 times a day x 4 weeks for DVT ppx  ascorbic acid 500 mg oral tablet: 1 tab(s) orally 2 times a day  calcium-vitamin D 500 mg-5 mcg (200 intl units) oral tablet: 1 tab(s) orally 3 times a day  cephalexin 500 mg oral capsule: 1 cap(s) orally 4 times a day x 3 months as per infectious disease  folic acid 1 mg oral tablet: 1 tab(s) orally once a day  Januvia 100 mg oral tablet: 1 tab(s) orally once a day  magnesium hydroxide 8% oral suspension: 30 milliliter(s) orally once a day As needed Constipation  Metoprolol Succinate  mg oral tablet, extended release: 1 tab(s) orally once a day  Multiple Vitamins oral tablet: 1 tab(s) orally once a day  oxyCODONE 10 mg oral tablet: 1 tab(s) orally every 4 hours As needed Severe Pain (7 - 10)  oxyCODONE 5 mg oral tablet: 1 tab(s) orally every 4 hours As needed Moderate Pain (4 - 6)  pantoprazole 40 mg oral delayed release tablet: 1 tab(s) orally once a day (before a meal)  polyethylene glycol 3350 oral powder for reconstitution: 17 gram(s) orally once a day (at bedtime)  rosuvastatin 40 mg oral tablet: 1 tab(s) orally once a day  senna leaf extract oral tablet: 2 tab(s) orally once a day (at bedtime)  traMADol 50 mg oral tablet: 1 tab(s) orally every 6 hours As needed Mild Pain (1 - 3)   acetaminophen 325 mg oral tablet: 3 tab(s) orally every 8 hours  aluminum hydroxide-magnesium hydroxide 200 mg-200 mg/5 mL oral suspension: 30 milliliter(s) orally 4 times a day As needed Indigestion  amLODIPine 5 mg oral tablet: 1 tab(s) orally once a day  apixaban 2.5 mg oral tablet: 1 tab(s) orally 2 times a day x 4 weeks for DVT ppx  ascorbic acid 500 mg oral tablet: 1 tab(s) orally 2 times a day  calcium-vitamin D 500 mg-5 mcg (200 intl units) oral tablet: 1 tab(s) orally 3 times a day  cephalexin 500 mg oral capsule: 1 cap(s) orally every 6 hours x 3 months as per infectious disease  folic acid 1 mg oral tablet: 1 tab(s) orally once a day  Januvia 100 mg oral tablet: 1 tab(s) orally once a day  magnesium hydroxide 8% oral suspension: 30 milliliter(s) orally once a day As needed Constipation  Metoprolol Succinate  mg oral tablet, extended release: 1 tab(s) orally once a day  Multiple Vitamins oral tablet: 1 tab(s) orally once a day  oxyCODONE 10 mg oral tablet: 1 tab(s) orally every 4 hours As needed Severe Pain (7 - 10)  oxyCODONE 5 mg oral tablet: 1 tab(s) orally every 4 hours As needed Moderate Pain (4 - 6)  pantoprazole 40 mg oral delayed release tablet: 1 tab(s) orally once a day (before a meal)  polyethylene glycol 3350 oral powder for reconstitution: 17 gram(s) orally once a day (at bedtime)  rosuvastatin 40 mg oral tablet: 1 tab(s) orally once a day  senna leaf extract oral tablet: 2 tab(s) orally once a day (at bedtime)  traMADol 50 mg oral tablet: 1 tab(s) orally every 6 hours As needed Mild Pain (1 - 3)

## 2023-03-22 NOTE — CHART NOTE - NSCHARTNOTEFT_GEN_A_CORE
ORTHO PROGRESS NOTE     Pt seen and examined at bedside, denies SOB, CP, Dizziness. N/V/D /HA.  No significant overnight events. Pain well controlled.    Vital Signs Last 24 Hrs  T(C): 36.2 (20 Mar 2023 23:10), Max: 36.5 (20 Mar 2023 11:25)  T(F): 97.2 (20 Mar 2023 23:10), Max: 97.7 (20 Mar 2023 11:25)  HR: 62 (21 Mar 2023 00:45) (47 - 72)  BP: 111/58 (21 Mar 2023 00:45) (77/40 - 155/56)  BP(mean): 80 (21 Mar 2023 00:45) (53 - 80)  RR: 14 (20 Mar 2023 23:30) (14 - 26)  SpO2: 100% (21 Mar 2023 00:45) (96% - 100%)    Parameters below as of 20 Mar 2023 23:10  Patient On (Oxygen Delivery Method): nasal cannula  O2 Flow (L/min): 2      Gen: NAD, alert and oriented  Resp: Unlabored breathing  RLE: Dressing c/d/i in KI       SILT DP/SP/ Gabrielle/Saph/tib       5/5 EHL 5/5 FHL 5/5 TA   5/5 IP        DP+,        soft compartments, no calf ttp,       Labs:  CBC Full  -  ( 20 Mar 2023 23:34 )  WBC Count : 15.47 K/uL  RBC Count : 3.06 M/uL  Hemoglobin : 9.5 g/dL  Hematocrit : 28.5 %  Platelet Count - Automated : 175 K/uL  Mean Cell Volume : 93.1 fl  Mean Cell Hemoglobin : 31.0 pg  Mean Cell Hemoglobin Concentration : 33.3 gm/dL  Auto Neutrophil # : x  Auto Lymphocyte # : x  Auto Monocyte # : x  Auto Eosinophil # : x  Auto Basophil # : x  Auto Neutrophil % : x  Auto Lymphocyte % : x  Auto Monocyte % : x  Auto Eosinophil % : x  Auto Basophil % : x      03-20    138  |  100  |  19  ----------------------------<  194<H>  4.2   |  24  |  1.18    Ca    7.7<L>      20 Mar 2023 23:34        A/P  Pt is a 77y Female s/p Stage 2 replant R TKR    - Pain control/ Analgesia  - DVT ppx Eliuqis  -PT/OT   -WBAT in KI  - Monitor drains
Patient was fit and delivered a Post-op type knee orthosis rigid positional support adjustable joints. with additional condylar control and drop lock hinges. Della was educated on the care use and function of the orthosis. Contact info was given to patient. All went without incident.   Ilan PATEL  [facility]  [phone]

## 2023-03-22 NOTE — PROGRESS NOTE ADULT - ASSESSMENT
77F with h/o HTN, Hyperlipidemia, DMT2, OA s/p right TKR at [facility] on 4/8/2022. Complicated by infection and  right total knee arthroplasty explantation with irrigation and debridement and placement of spacer 11/16/2022. Pt was in [facility] for treatment then discharged home. She has completed her course of antibiotics 1/4/2023 and now presents for right revision total knee arthoplasty irrigation and debridement. s/p procedure 3/20 ;

## 2023-03-22 NOTE — DISCHARGE NOTE PROVIDER - NSDCCPCAREPLAN_GEN_ALL_CORE_FT
PRINCIPAL DISCHARGE DIAGNOSIS  Diagnosis: Infected prosthetic knee joint, subsequent encounter  Assessment and Plan of Treatment:

## 2023-03-22 NOTE — DISCHARGE NOTE PROVIDER - NSDCFUADDAPPT_GEN_ALL_CORE_FT
Follow up appointments:   Dr. Reyes: 2 weeks  Dr. Chavez(plastic surgery): 7-10 days  Dr. Holley (infectious disease): 2 months

## 2023-03-22 NOTE — DISCHARGE NOTE PROVIDER - HOSPITAL COURSE
History of Present Illness:  77 year old female s/p right TKR at [facility] on 4/8/2022. S/P infection and  right total knee arthroplasty explantation with irrigation and debridement and placement of spacer 11/16/2022, was in rehab for treatment, now home. She has complete her course of antibiotics 1/4/2023 and now presents for right revision total knee arthoplasty irrigation. and debridement. PMH includes HTN, Hyperlipidemia, diabetes.    Goals of Care: "so I can walk again"     PAST MEDICAL HISTORY:  Diabetes mellitus recently started on Januvia  HTN (hypertension)   Hyperlipidemia.     PAST SURGICAL HISTORY:  H/O total knee replacement, right   History of cholecystectomy "years ago"  History of removal of joint prosthesis of knee due to infection.    Hospital Course:  After admission on 3/20/23 and the patient underwent an uncomplicated stage 2 revision right total knee replacement, explant of cement spacer with new implantation of revision components, with plastic surgery closure of the wound with gastrocnemius flap and skin application with Dr. Reyes and Dr. Chavez. Patient tolerated the procedure well and was transferred to the recovery room in stable condition, with a stable neuro / vascular exam of the operated extremity.    Patient was placed on Eliquis 2.5 BID for DVT ppx, and was placed on Protonix for GI protection.   Patient was made weight bearing as tolerated with the operative leg in a knee immobilizer locked in extension.   Prevena incisional vac and JEFFERSON drain placed to the surgical site, outputs closely monitored  JEFFERSON drain removed on XXXXXX    Infectious disease consulted and recommendations followed. 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 Surgical cultures followed XXXXX. Final ID plan XXXX    Patient evaluated by PT/OT and recommended for disposition for subacute rehab.     Discharge and Orthopedic Care instructions were delineated in the Discharge Plan and reviewed with the patient. All medications were delineated in the medication reconciliation tool and key points were reviewed with the patient. They were deemed stable from an Orthopedic & medical standpoint for discharge *** History of Present Illness:  77 year old female s/p right TKR at [facility] on 4/8/2022. S/P infection and  right total knee arthroplasty explantation with irrigation and debridement and placement of spacer 11/16/2022, was in rehab for treatment, now home. She has complete her course of antibiotics 1/4/2023 and now presents for right revision total knee arthoplasty irrigation. and debridement. PMH includes HTN, Hyperlipidemia, diabetes.    Goals of Care: "so I can walk again"     PAST MEDICAL HISTORY:  Diabetes mellitus recently started on Januvia  HTN (hypertension)   Hyperlipidemia.     PAST SURGICAL HISTORY:  H/O total knee replacement, right   History of cholecystectomy "years ago"  History of removal of joint prosthesis of knee due to infection.    Hospital Course:  After admission on 3/20/23 and the patient underwent an uncomplicated stage 2 revision right total knee replacement, explant of cement spacer with new implantation of revision components, with plastic surgery closure of the wound with gastrocnemius flap and skin application with Dr. Reyes and Dr. Chavez. Patient tolerated the procedure well and was transferred to the recovery room in stable condition, with a stable neuro / vascular exam of the operated extremity.    Patient was placed on Eliquis 2.5 BID for DVT ppx, and was placed on Protonix for GI protection.   Patient was made weight bearing as tolerated with the operative leg in a knee immobilizer locked in extension.     Plastic Surgery team consulted and followed patient during hospital course. Recommendations followed.   Prevena incisional vac and JEFFERSON drain placed to the surgical site, outputs closely monitored.   JEFFERSON drain removed on XXXXXX. Prevena incisional vac XXXXX.     Infectious disease consulted and recommendations followed. 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 Surgical cultures followed, negative. Final ID plan: Keflex 500mg q6h x 3 months. Patient to fu with Dr. Holley in 2 months for reevaluation.     Patient evaluated by PT/OT and recommended for disposition for subacute rehab.     Discharge and Orthopedic Care instructions were delineated in the Discharge Plan and reviewed with the patient. All medications were delineated in the medication reconciliation tool and key points were reviewed with the patient. They were deemed stable from an Orthopedic & medical standpoint for discharge. History of Present Illness:  77 year old female s/p right TKR at [facility] on 4/8/2022. S/P infection and  right total knee arthroplasty explantation with irrigation and debridement and placement of spacer 11/16/2022, was in rehab for treatment, now home. She has complete her course of antibiotics 1/4/2023 and now presents for right revision total knee arthoplasty irrigation. and debridement. PMH includes HTN, Hyperlipidemia, diabetes.    Goals of Care: "so I can walk again"     PAST MEDICAL HISTORY:  Diabetes mellitus recently started on Januvia  HTN (hypertension)   Hyperlipidemia.     PAST SURGICAL HISTORY:  H/O total knee replacement, right   History of cholecystectomy "years ago"  History of removal of joint prosthesis of knee due to infection.    Hospital Course:  After admission on 3/20/23 and the patient underwent an uncomplicated stage 2 revision right total knee replacement, explant of cement spacer with new implantation of revision components, with plastic surgery closure of the wound with gastrocnemius flap and skin application with Dr. Reyes and Dr. Chavez. Patient tolerated the procedure well and was transferred to the recovery room in stable condition, with a stable neuro / vascular exam of the operated extremity.    Patient was placed on Eliquis 2.5 BID for DVT ppx, and was placed on Protonix for GI protection.   Patient was made weight bearing as tolerated with the operative leg in a knee immobilizer locked in extension.     Plastic Surgery team consulted and followed patient during hospital course. Recommendations followed.   Prevena incisional vac and JEFFERSON drain placed to the surgical site, outputs closely monitored.   JEFFERSON drain removed on 3/27. Prevena incisional vac 3/27.     Infectious disease consulted and recommendations followed. 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 Surgical cultures followed, negative. Final ID plan: Keflex 500mg q6h x 3 months. Patient to fu with Dr. Holley in 2 months for reevaluation.     Patient evaluated by PT/OT and recommended for disposition for subacute rehab.     Discharge and Orthopedic Care instructions were delineated in the Discharge Plan and reviewed with the patient. All medications were delineated in the medication reconciliation tool and key points were reviewed with the patient. They were deemed stable from an Orthopedic & medical standpoint for discharge.       Addendum:   The post-op CBC results showed decreased H/H levels from baseline (admission H/H 9.5/28.5, post-op H.H 6.8/20.7) and she received 2 units PRBC due to Acute post-operative anemia due to blood loss, with appropriate response.

## 2023-03-22 NOTE — DISCHARGE NOTE PROVIDER - CARE PROVIDER_API CALL
Alvarado Reyes)  Orthopaedic Surgery  [address], [address]  [place] [address]  Phone: [phone]  Fax: [phone]  Scheduled Appointment: 04/04/2023   Alvarado Reyes)  Orthopaedic Surgery  [address], [address]  [place] [address]  Phone: [phone]  Fax: [phone]  Scheduled Appointment: 04/04/2023    John Holley; MBBS)  Infectious Disease; Internal Medicine  [address] [place] [address]  Phone: [phone]  Fax: [phone]  Established Patient  Follow Up Time: 2 months    Joe Chavez)  Plastic Surgery  [address]  [place] [address]  Phone: [phone]  Fax: [phone]  Established Patient  Follow Up Time:    Alvarado Reyes)  Orthopaedic Surgery  [address], [address]  [place] [address]  Phone: [phone]  Fax: [phone]  Scheduled Appointment: 04/04/2023    John Holley; MBBS)  Infectious Disease; Internal Medicine  [address] [place] [address]  Phone: [phone]  Fax: [phone]  Established Patient  Follow Up Time: 2 months    Joe Chavez)  Plastic Surgery  [address]  [place] [address]  Phone: [phone]  Fax: [phone]  Established Patient  Follow Up Time: 1 week

## 2023-03-22 NOTE — DISCHARGE NOTE PROVIDER - NSDCFUADDINST_GEN_ALL_CORE_FT
Weight bearing: WBAT in knee immobilizer([place] locked in extension) with rolling walker   Weight bearing: WBAT in knee immobilizer([place] locked in extension) with rolling walker.  Keep dressing clean dry and intact.   Continue on Eliquis 2.5mg BID for DVT ppx x 4 weeks post operatively.   Continue on Keflex 500mg 4 times daily for 3 months as directed by infectious disease for infection prevention   Weight bearing: WBAT in knee immobilizer( [place]0-30 degrees of flexion) with rolling walker.  Keep dressing clean dry and intact.   Continue on Eliquis 2.5mg BID for DVT ppx x 4 weeks post operatively.   Continue on Keflex 500mg 4 times daily for 3 months as directed by infectious disease for infection prevention. FU with ID 2 months post operatively.   Dressing Instructions as per plastic surgery: Xeroform and Telfa gauze placed over skin graft site please change daily, Xeroform and abd pad placed over skin donor site change as needed. Previous JEFFERSON site with guaze placed over it change as needed.

## 2023-03-22 NOTE — DISCHARGE NOTE PROVIDER - CARE PROVIDERS DIRECT ADDRESSES
[email or web address]

## 2023-03-22 NOTE — DISCHARGE NOTE PROVIDER - NSDCCPTREATMENT_GEN_ALL_CORE_FT
PRINCIPAL PROCEDURE  Procedure: Revision total knee arthroplasty  Findings and Treatment: right      SECONDARY PROCEDURE  Procedure: Closure, wound, using gastrocnemius flap and skin graft application  Findings and Treatment:      PRINCIPAL PROCEDURE  Procedure: Revision total knee arthroplasty  Findings and Treatment: right      SECONDARY PROCEDURE  Procedure: Closure, wound, using gastrocnemius flap and skin graft application  Findings and Treatment: Dressings: Xeroform and Telfa gauze placed over skin graft site please change daily, Xeroform and abd pad placed over skin donor site change as needed. Previous JEFFERSON site with guaze placed over it change as needed.

## 2023-03-22 NOTE — DISCHARGE NOTE PROVIDER - NSDCFUSCHEDAPPT_GEN_ALL_CORE_FT
[facility]  ORTHOSURG [address]  Scheduled Appointment: 04/04/2023     Joe Chavez  [facility]  PLASTICSUR [address]  Scheduled Appointment: 04/26/2023    Alvarado Reyes  [facility]  ORTHOSURG [address]  Scheduled Appointment: 04/27/2023

## 2023-03-22 NOTE — PROGRESS NOTE ADULT - ASSESSMENT
77 year old female s/p revision right total knee replacement, stage 2, with gastroc flap and STSG plastic surgery closure.    Plan:  - Maintain prevena vac  - monitor JEFFERSON output   - Dressing down POD5  - Care per primary team

## 2023-03-22 NOTE — DISCHARGE NOTE PROVIDER - PROVIDER TOKENS
PROVIDER:[TOKEN:[[id number]],SCHEDULEDAPPT:[04/04/2023]] PROVIDER:[TOKEN:[[id number]],SCHEDULEDAPPT:[04/04/2023]],PROVIDER:[TOKEN:[[id number]],FOLLOWUP:[2 months],ESTABLISHEDPATIENT:[T]],PROVIDER:[TOKEN:[[id number]],ESTABLISHEDPATIENT:[T]] PROVIDER:[TOKEN:[[id number]],SCHEDULEDAPPT:[04/04/2023]],PROVIDER:[TOKEN:[[id number]],FOLLOWUP:[2 months],ESTABLISHEDPATIENT:[T]],PROVIDER:[TOKEN:[[id number]],FOLLOWUP:[1 week],ESTABLISHEDPATIENT:[T]]

## 2023-03-23 LAB
ANION GAP SERPL CALC-SCNC: 11 MMOL/L — SIGNIFICANT CHANGE UP (ref 5–17)
BUN SERPL-MCNC: 20 MG/DL — SIGNIFICANT CHANGE UP (ref 7–23)
CALCIUM SERPL-MCNC: 8.8 MG/DL — SIGNIFICANT CHANGE UP (ref 8.4–10.5)
CHLORIDE SERPL-SCNC: 102 MMOL/L — SIGNIFICANT CHANGE UP (ref 96–108)
CO2 SERPL-SCNC: 24 MMOL/L — SIGNIFICANT CHANGE UP (ref 22–31)
CREAT SERPL-MCNC: 1.12 MG/DL — SIGNIFICANT CHANGE UP (ref 0.5–1.3)
EGFR: 50 ML/MIN/1.73M2 — LOW
GLUCOSE SERPL-MCNC: 141 MG/DL — HIGH (ref 70–99)
HCT VFR BLD CALC: 32.7 % — LOW (ref 34.5–45)
HGB BLD-MCNC: 11.2 G/DL — LOW (ref 11.5–15.5)
MCHC RBC-ENTMCNC: 31.5 PG — SIGNIFICANT CHANGE UP (ref 27–34)
MCHC RBC-ENTMCNC: 34.3 GM/DL — SIGNIFICANT CHANGE UP (ref 32–36)
MCV RBC AUTO: 92.1 FL — SIGNIFICANT CHANGE UP (ref 80–100)
NRBC # BLD: 0 /100 WBCS — SIGNIFICANT CHANGE UP (ref 0–0)
PLATELET # BLD AUTO: 155 K/UL — SIGNIFICANT CHANGE UP (ref 150–400)
POTASSIUM SERPL-MCNC: 3.6 MMOL/L — SIGNIFICANT CHANGE UP (ref 3.5–5.3)
POTASSIUM SERPL-SCNC: 3.6 MMOL/L — SIGNIFICANT CHANGE UP (ref 3.5–5.3)
RBC # BLD: 3.55 M/UL — LOW (ref 3.8–5.2)
RBC # FLD: 13.7 % — SIGNIFICANT CHANGE UP (ref 10.3–14.5)
SODIUM SERPL-SCNC: 137 MMOL/L — SIGNIFICANT CHANGE UP (ref 135–145)
WBC # BLD: 13.58 K/UL — HIGH (ref 3.8–10.5)
WBC # FLD AUTO: 13.58 K/UL — HIGH (ref 3.8–10.5)

## 2023-03-23 PROCEDURE — 99232 SBSQ HOSP IP/OBS MODERATE 35: CPT

## 2023-03-23 RX ADMIN — Medication 1 TABLET(S): at 05:27

## 2023-03-23 RX ADMIN — OXYCODONE HYDROCHLORIDE 5 MILLIGRAM(S): 5 TABLET ORAL at 16:00

## 2023-03-23 RX ADMIN — Medication 1 TABLET(S): at 21:42

## 2023-03-23 RX ADMIN — Medication 975 MILLIGRAM(S): at 22:53

## 2023-03-23 RX ADMIN — OXYCODONE HYDROCHLORIDE 5 MILLIGRAM(S): 5 TABLET ORAL at 14:46

## 2023-03-23 RX ADMIN — Medication 975 MILLIGRAM(S): at 11:53

## 2023-03-23 RX ADMIN — Medication 500 MILLIGRAM(S): at 18:02

## 2023-03-23 RX ADMIN — Medication 100 MILLIGRAM(S): at 05:27

## 2023-03-23 RX ADMIN — Medication 975 MILLIGRAM(S): at 05:27

## 2023-03-23 RX ADMIN — Medication 1 MILLIGRAM(S): at 11:53

## 2023-03-23 RX ADMIN — APIXABAN 2.5 MILLIGRAM(S): 2.5 TABLET, FILM COATED ORAL at 18:02

## 2023-03-23 RX ADMIN — ATORVASTATIN CALCIUM 40 MILLIGRAM(S): 80 TABLET, FILM COATED ORAL at 21:42

## 2023-03-23 RX ADMIN — Medication 1: at 07:55

## 2023-03-23 RX ADMIN — Medication 975 MILLIGRAM(S): at 05:57

## 2023-03-23 RX ADMIN — Medication 100 MILLIGRAM(S): at 14:46

## 2023-03-23 RX ADMIN — APIXABAN 2.5 MILLIGRAM(S): 2.5 TABLET, FILM COATED ORAL at 05:27

## 2023-03-23 RX ADMIN — OXYCODONE HYDROCHLORIDE 10 MILLIGRAM(S): 5 TABLET ORAL at 00:04

## 2023-03-23 RX ADMIN — AMLODIPINE BESYLATE 5 MILLIGRAM(S): 2.5 TABLET ORAL at 18:02

## 2023-03-23 RX ADMIN — OXYCODONE HYDROCHLORIDE 5 MILLIGRAM(S): 5 TABLET ORAL at 18:27

## 2023-03-23 RX ADMIN — Medication 100 MILLIGRAM(S): at 05:28

## 2023-03-23 RX ADMIN — OXYCODONE HYDROCHLORIDE 5 MILLIGRAM(S): 5 TABLET ORAL at 09:10

## 2023-03-23 RX ADMIN — OXYCODONE HYDROCHLORIDE 5 MILLIGRAM(S): 5 TABLET ORAL at 10:00

## 2023-03-23 RX ADMIN — Medication 975 MILLIGRAM(S): at 21:42

## 2023-03-23 RX ADMIN — Medication 100 MILLIGRAM(S): at 21:41

## 2023-03-23 RX ADMIN — Medication 1 TABLET(S): at 11:52

## 2023-03-23 RX ADMIN — Medication 975 MILLIGRAM(S): at 13:00

## 2023-03-23 RX ADMIN — Medication 1 TABLET(S): at 14:47

## 2023-03-23 RX ADMIN — Medication 1: at 12:31

## 2023-03-23 RX ADMIN — Medication 20 MILLIGRAM(S): at 05:27

## 2023-03-23 RX ADMIN — OXYCODONE HYDROCHLORIDE 10 MILLIGRAM(S): 5 TABLET ORAL at 22:43

## 2023-03-23 RX ADMIN — PANTOPRAZOLE SODIUM 40 MILLIGRAM(S): 20 TABLET, DELAYED RELEASE ORAL at 06:04

## 2023-03-23 RX ADMIN — Medication 1: at 16:35

## 2023-03-23 RX ADMIN — SENNA PLUS 2 TABLET(S): 8.6 TABLET ORAL at 21:42

## 2023-03-23 RX ADMIN — OXYCODONE HYDROCHLORIDE 5 MILLIGRAM(S): 5 TABLET ORAL at 19:30

## 2023-03-23 RX ADMIN — Medication 500 MILLIGRAM(S): at 05:27

## 2023-03-23 NOTE — PROGRESS NOTE ADULT - ASSESSMENT
77F with h/o HTN, Hyperlipidemia, DMT2, OA s/p right TKR at [facility] on 4/8/2022. Complicated by infection and  right total knee arthroplasty explantation with irrigation and debridement and placement of spacer 11/16/2022. Pt was in [facility] for treatment then discharged home. She has completed her course of antibiotics 1/4/2023 and now presents for right revision total knee arthoplasty irrigation and debridement. s/p procedure 3/20 ;

## 2023-03-23 NOTE — PROGRESS NOTE ADULT - ASSESSMENT
A/P:  Pt is a 77 yr old female s/p revision right total knee replacement, stage 2, with gastroc flap/plastic surgery closure, POD #3    - Pain control/ Analgesia  - DVT ppx Eliquis 2.5 mg BID x 4 weeks, foot pumps  - IVAC  - Monitor JEFFERSON output with plastic surgery.   - PT/OT - wbat/oob - in knee immobilizer. [place] ordered - locked in extension with settings 0-30  - Incentive Spirometer  - GI prophylaxis: Protonix  - Appreciate ID recs - ancef q8 standing at this time. pending OR cultures, currently NGTD  - Appreciate medicine recs.   - FU AM labs  - notify Ortho for questions   - Dispo planning to [facility] pending hospital course.

## 2023-03-24 DIAGNOSIS — E87.6 HYPOKALEMIA: ICD-10-CM

## 2023-03-24 LAB
ANION GAP SERPL CALC-SCNC: 9 MMOL/L — SIGNIFICANT CHANGE UP (ref 5–17)
BUN SERPL-MCNC: 21 MG/DL — SIGNIFICANT CHANGE UP (ref 7–23)
CALCIUM SERPL-MCNC: 8.7 MG/DL — SIGNIFICANT CHANGE UP (ref 8.4–10.5)
CHLORIDE SERPL-SCNC: 98 MMOL/L — SIGNIFICANT CHANGE UP (ref 96–108)
CO2 SERPL-SCNC: 29 MMOL/L — SIGNIFICANT CHANGE UP (ref 22–31)
CREAT SERPL-MCNC: 1.09 MG/DL — SIGNIFICANT CHANGE UP (ref 0.5–1.3)
EGFR: 52 ML/MIN/1.73M2 — LOW
GLUCOSE SERPL-MCNC: 140 MG/DL — HIGH (ref 70–99)
HCT VFR BLD CALC: 31 % — LOW (ref 34.5–45)
HGB BLD-MCNC: 10.6 G/DL — LOW (ref 11.5–15.5)
MCHC RBC-ENTMCNC: 31.4 PG — SIGNIFICANT CHANGE UP (ref 27–34)
MCHC RBC-ENTMCNC: 34.2 GM/DL — SIGNIFICANT CHANGE UP (ref 32–36)
MCV RBC AUTO: 91.7 FL — SIGNIFICANT CHANGE UP (ref 80–100)
NRBC # BLD: 0 /100 WBCS — SIGNIFICANT CHANGE UP (ref 0–0)
PLATELET # BLD AUTO: 163 K/UL — SIGNIFICANT CHANGE UP (ref 150–400)
POTASSIUM SERPL-MCNC: 3.1 MMOL/L — LOW (ref 3.5–5.3)
POTASSIUM SERPL-SCNC: 3.1 MMOL/L — LOW (ref 3.5–5.3)
RBC # BLD: 3.38 M/UL — LOW (ref 3.8–5.2)
RBC # FLD: 13.3 % — SIGNIFICANT CHANGE UP (ref 10.3–14.5)
SARS-COV-2 RNA SPEC QL NAA+PROBE: SIGNIFICANT CHANGE UP
SODIUM SERPL-SCNC: 136 MMOL/L — SIGNIFICANT CHANGE UP (ref 135–145)
WBC # BLD: 9.61 K/UL — SIGNIFICANT CHANGE UP (ref 3.8–10.5)
WBC # FLD AUTO: 9.61 K/UL — SIGNIFICANT CHANGE UP (ref 3.8–10.5)

## 2023-03-24 PROCEDURE — 99232 SBSQ HOSP IP/OBS MODERATE 35: CPT

## 2023-03-24 RX ORDER — CEPHALEXIN 500 MG
500 CAPSULE ORAL
Refills: 0 | Status: DISCONTINUED | OUTPATIENT
Start: 2023-03-24 | End: 2023-03-27

## 2023-03-24 RX ORDER — POTASSIUM CHLORIDE 20 MEQ
20 PACKET (EA) ORAL
Refills: 0 | Status: COMPLETED | OUTPATIENT
Start: 2023-03-24 | End: 2023-03-24

## 2023-03-24 RX ADMIN — Medication 20 MILLIEQUIVALENT(S): at 11:31

## 2023-03-24 RX ADMIN — AMLODIPINE BESYLATE 5 MILLIGRAM(S): 2.5 TABLET ORAL at 17:53

## 2023-03-24 RX ADMIN — OXYCODONE HYDROCHLORIDE 5 MILLIGRAM(S): 5 TABLET ORAL at 12:30

## 2023-03-24 RX ADMIN — Medication 20 MILLIEQUIVALENT(S): at 12:15

## 2023-03-24 RX ADMIN — Medication 500 MILLIGRAM(S): at 14:20

## 2023-03-24 RX ADMIN — Medication 20 MILLIEQUIVALENT(S): at 14:24

## 2023-03-24 RX ADMIN — OXYCODONE HYDROCHLORIDE 10 MILLIGRAM(S): 5 TABLET ORAL at 20:01

## 2023-03-24 RX ADMIN — Medication 2: at 12:13

## 2023-03-24 RX ADMIN — Medication 1 TABLET(S): at 21:52

## 2023-03-24 RX ADMIN — Medication 500 MILLIGRAM(S): at 17:44

## 2023-03-24 RX ADMIN — Medication 500 MILLIGRAM(S): at 17:46

## 2023-03-24 RX ADMIN — OXYCODONE HYDROCHLORIDE 5 MILLIGRAM(S): 5 TABLET ORAL at 11:31

## 2023-03-24 RX ADMIN — APIXABAN 2.5 MILLIGRAM(S): 2.5 TABLET, FILM COATED ORAL at 17:46

## 2023-03-24 RX ADMIN — SODIUM CHLORIDE 100 MILLILITER(S): 9 INJECTION INTRAMUSCULAR; INTRAVENOUS; SUBCUTANEOUS at 00:00

## 2023-03-24 RX ADMIN — Medication 975 MILLIGRAM(S): at 12:14

## 2023-03-24 RX ADMIN — Medication 1 TABLET(S): at 14:20

## 2023-03-24 RX ADMIN — Medication 975 MILLIGRAM(S): at 13:00

## 2023-03-24 RX ADMIN — Medication 975 MILLIGRAM(S): at 21:51

## 2023-03-24 RX ADMIN — Medication 100 MILLIGRAM(S): at 06:10

## 2023-03-24 RX ADMIN — Medication 20 MILLIGRAM(S): at 06:10

## 2023-03-24 RX ADMIN — Medication 100 MILLIGRAM(S): at 06:09

## 2023-03-24 RX ADMIN — SENNA PLUS 2 TABLET(S): 8.6 TABLET ORAL at 21:52

## 2023-03-24 RX ADMIN — Medication 500 MILLIGRAM(S): at 06:09

## 2023-03-24 RX ADMIN — Medication 1 TABLET(S): at 06:09

## 2023-03-24 RX ADMIN — Medication 1 MILLIGRAM(S): at 11:36

## 2023-03-24 RX ADMIN — Medication 975 MILLIGRAM(S): at 06:39

## 2023-03-24 RX ADMIN — POLYETHYLENE GLYCOL 3350 17 GRAM(S): 17 POWDER, FOR SOLUTION ORAL at 21:52

## 2023-03-24 RX ADMIN — APIXABAN 2.5 MILLIGRAM(S): 2.5 TABLET, FILM COATED ORAL at 06:09

## 2023-03-24 RX ADMIN — Medication 1 TABLET(S): at 11:36

## 2023-03-24 RX ADMIN — Medication 1: at 07:35

## 2023-03-24 RX ADMIN — Medication 975 MILLIGRAM(S): at 06:09

## 2023-03-24 RX ADMIN — Medication 500 MILLIGRAM(S): at 23:38

## 2023-03-24 RX ADMIN — ATORVASTATIN CALCIUM 40 MILLIGRAM(S): 80 TABLET, FILM COATED ORAL at 21:52

## 2023-03-24 RX ADMIN — PANTOPRAZOLE SODIUM 40 MILLIGRAM(S): 20 TABLET, DELAYED RELEASE ORAL at 06:10

## 2023-03-24 NOTE — PROGRESS NOTE ADULT - ASSESSMENT
77F with h/o HTN, Hyperlipidemia, DMT2, OA s/p right TKR at [facility] on 4/8/2022. Complicated by infection and  right total knee arthroplasty explantation with irrigation and debridement and placement of spacer 11/16/2022. Pt was in [facility] for treatment then discharged home. She has completed her course of antibiotics 1/4/2023 and now presents for right revision total knee arthoplasty irrigation and debridement. s/p procedure 3/20 ;

## 2023-03-24 NOTE — PROGRESS NOTE ADULT - ASSESSMENT
78 y/o FM s/p Stage 2 Revision right total knee replacement with plastics gastroc flap/wound closure POD#4, as per PRS, wound check 3/27  Vernoica Catherine PA-C  Orthopaedic Surgery  Team pager [phone]  [facility] [phone]  entjub-[phone]   76 y/o FM s/p Stage 2 Revision right total knee replacement with plastics gastroc flap/wound closure POD#4, as per PRS, wound check 3/27, patient for [facility] after, patient instructed to stop all Rheumatologic agents by patients Rheumatiologist and to follow up after discharge from rehab facility.  Veronica Catherine PA-C  Orthopaedic Surgery  Team pager [phone]  [facility] [phone]  [phone]   76 y/o FM s/p Stage 2 Revision right total knee replacement with plastics gastroc flap/wound closure POD#4, as per PRS, wound check 3/27, patient for [facility] after.  Veronica Catherine PA-C  Orthopaedic Surgery  Team pager [phone]  [facility] [phone]  [phone]

## 2023-03-24 NOTE — PROGRESS NOTE ADULT - ASSESSMENT
77 year old female s/p right TKR at [facility] on 4/8/2022. S/P infection and  right total knee arthroplasty explantation with irrigation and debridement and placement of spacer 11/16/2022, was in rehab for treatment, now home. She has complete her course of antibiotics 1/4/2023 and now presents for right revision total knee arthoplasty irrigation. and debridement.    John Holley  Attending Physician   Division of Infectious Disease  Office #[phone]  Available on Microsoft Teams also  After 5pm/weekend or no response, call #[phone]

## 2023-03-24 NOTE — PROGRESS NOTE ADULT - ASSESSMENT
Assessment & Plan  77 year old female s/p revision right total knee replacement, stage 2, with gastroc flap and STSG plastic surgery closure.    Plan:  - Maintain prevena vac  - monitor JEFFERSON output   - Dressing down POD5  - Care per primary team       Jaycee Cooper  Plastic & Reconstructive Surgery, PGY-1  Plastic Surgery (pg DARIUS: [phone], NS: [phone])

## 2023-03-25 RX ADMIN — Medication 1: at 12:00

## 2023-03-25 RX ADMIN — Medication 20 MILLIGRAM(S): at 05:20

## 2023-03-25 RX ADMIN — OXYCODONE HYDROCHLORIDE 5 MILLIGRAM(S): 5 TABLET ORAL at 20:07

## 2023-03-25 RX ADMIN — Medication 975 MILLIGRAM(S): at 21:07

## 2023-03-25 RX ADMIN — ATORVASTATIN CALCIUM 40 MILLIGRAM(S): 80 TABLET, FILM COATED ORAL at 21:14

## 2023-03-25 RX ADMIN — OXYCODONE HYDROCHLORIDE 5 MILLIGRAM(S): 5 TABLET ORAL at 05:20

## 2023-03-25 RX ADMIN — Medication 500 MILLIGRAM(S): at 12:00

## 2023-03-25 RX ADMIN — Medication 975 MILLIGRAM(S): at 05:20

## 2023-03-25 RX ADMIN — PANTOPRAZOLE SODIUM 40 MILLIGRAM(S): 20 TABLET, DELAYED RELEASE ORAL at 05:20

## 2023-03-25 RX ADMIN — Medication 1 TABLET(S): at 21:14

## 2023-03-25 RX ADMIN — Medication 500 MILLIGRAM(S): at 17:11

## 2023-03-25 RX ADMIN — APIXABAN 2.5 MILLIGRAM(S): 2.5 TABLET, FILM COATED ORAL at 05:20

## 2023-03-25 RX ADMIN — Medication 1 TABLET(S): at 05:20

## 2023-03-25 RX ADMIN — Medication 100 MILLIGRAM(S): at 05:20

## 2023-03-25 RX ADMIN — APIXABAN 2.5 MILLIGRAM(S): 2.5 TABLET, FILM COATED ORAL at 17:10

## 2023-03-25 RX ADMIN — Medication 500 MILLIGRAM(S): at 05:20

## 2023-03-25 RX ADMIN — OXYCODONE HYDROCHLORIDE 5 MILLIGRAM(S): 5 TABLET ORAL at 13:35

## 2023-03-25 RX ADMIN — MAGNESIUM HYDROXIDE 30 MILLILITER(S): 400 TABLET, CHEWABLE ORAL at 05:20

## 2023-03-25 RX ADMIN — Medication 1 TABLET(S): at 12:00

## 2023-03-25 RX ADMIN — OXYCODONE HYDROCHLORIDE 5 MILLIGRAM(S): 5 TABLET ORAL at 09:35

## 2023-03-25 RX ADMIN — Medication 1 TABLET(S): at 13:25

## 2023-03-25 RX ADMIN — Medication 500 MILLIGRAM(S): at 23:53

## 2023-03-25 RX ADMIN — Medication 975 MILLIGRAM(S): at 20:13

## 2023-03-25 RX ADMIN — Medication 1 MILLIGRAM(S): at 12:00

## 2023-03-25 RX ADMIN — AMLODIPINE BESYLATE 5 MILLIGRAM(S): 2.5 TABLET ORAL at 17:10

## 2023-03-25 RX ADMIN — OXYCODONE HYDROCHLORIDE 5 MILLIGRAM(S): 5 TABLET ORAL at 21:07

## 2023-03-25 RX ADMIN — Medication 975 MILLIGRAM(S): at 13:25

## 2023-03-25 NOTE — PROGRESS NOTE ADULT - ASSESSMENT
A/P: 78 y/o F POD#5 s/p Stage 2 replant R TKR/Gastro flap    DVT ppx- Eliquis  RLE WBAT in Carlos LIE  Prevena and JEFFERSON as per PRS  Keflex 500mg PO Q6H X 3 months as per ID  Pain management prn  PT  OT  Gi ppx  Discharge planning to [facility]  D/W attending      LANA Santos  Orthopedic Surgery  [phone]

## 2023-03-26 LAB
ANION GAP SERPL CALC-SCNC: 12 MMOL/L — SIGNIFICANT CHANGE UP (ref 5–17)
ANION GAP SERPL CALC-SCNC: 12 MMOL/L — SIGNIFICANT CHANGE UP (ref 5–17)
BUN SERPL-MCNC: 21 MG/DL — SIGNIFICANT CHANGE UP (ref 7–23)
BUN SERPL-MCNC: 21 MG/DL — SIGNIFICANT CHANGE UP (ref 7–23)
CALCIUM SERPL-MCNC: 8.9 MG/DL — SIGNIFICANT CHANGE UP (ref 8.4–10.5)
CALCIUM SERPL-MCNC: 9 MG/DL — SIGNIFICANT CHANGE UP (ref 8.4–10.5)
CHLORIDE SERPL-SCNC: 97 MMOL/L — SIGNIFICANT CHANGE UP (ref 96–108)
CHLORIDE SERPL-SCNC: 98 MMOL/L — SIGNIFICANT CHANGE UP (ref 96–108)
CO2 SERPL-SCNC: 28 MMOL/L — SIGNIFICANT CHANGE UP (ref 22–31)
CO2 SERPL-SCNC: 29 MMOL/L — SIGNIFICANT CHANGE UP (ref 22–31)
CREAT SERPL-MCNC: 0.9 MG/DL — SIGNIFICANT CHANGE UP (ref 0.5–1.3)
CREAT SERPL-MCNC: 0.94 MG/DL — SIGNIFICANT CHANGE UP (ref 0.5–1.3)
EGFR: 62 ML/MIN/1.73M2 — SIGNIFICANT CHANGE UP
EGFR: 65 ML/MIN/1.73M2 — SIGNIFICANT CHANGE UP
GLUCOSE SERPL-MCNC: 120 MG/DL — HIGH (ref 70–99)
GLUCOSE SERPL-MCNC: 120 MG/DL — HIGH (ref 70–99)
HCT VFR BLD CALC: 30.5 % — LOW (ref 34.5–45)
HCT VFR BLD CALC: 31 % — LOW (ref 34.5–45)
HGB BLD-MCNC: 10 G/DL — LOW (ref 11.5–15.5)
HGB BLD-MCNC: 10.1 G/DL — LOW (ref 11.5–15.5)
MCHC RBC-ENTMCNC: 30.2 PG — SIGNIFICANT CHANGE UP (ref 27–34)
MCHC RBC-ENTMCNC: 31 PG — SIGNIFICANT CHANGE UP (ref 27–34)
MCHC RBC-ENTMCNC: 32.3 GM/DL — SIGNIFICANT CHANGE UP (ref 32–36)
MCHC RBC-ENTMCNC: 33.1 GM/DL — SIGNIFICANT CHANGE UP (ref 32–36)
MCV RBC AUTO: 93.6 FL — SIGNIFICANT CHANGE UP (ref 80–100)
MCV RBC AUTO: 93.7 FL — SIGNIFICANT CHANGE UP (ref 80–100)
NRBC # BLD: 0 /100 WBCS — SIGNIFICANT CHANGE UP (ref 0–0)
NRBC # BLD: 0 /100 WBCS — SIGNIFICANT CHANGE UP (ref 0–0)
PLATELET # BLD AUTO: 231 K/UL — SIGNIFICANT CHANGE UP (ref 150–400)
PLATELET # BLD AUTO: 231 K/UL — SIGNIFICANT CHANGE UP (ref 150–400)
POTASSIUM SERPL-MCNC: 3.4 MMOL/L — LOW (ref 3.5–5.3)
POTASSIUM SERPL-MCNC: 3.4 MMOL/L — LOW (ref 3.5–5.3)
POTASSIUM SERPL-SCNC: 3.4 MMOL/L — LOW (ref 3.5–5.3)
POTASSIUM SERPL-SCNC: 3.4 MMOL/L — LOW (ref 3.5–5.3)
RBC # BLD: 3.26 M/UL — LOW (ref 3.8–5.2)
RBC # BLD: 3.31 M/UL — LOW (ref 3.8–5.2)
RBC # FLD: 13.1 % — SIGNIFICANT CHANGE UP (ref 10.3–14.5)
RBC # FLD: 13.1 % — SIGNIFICANT CHANGE UP (ref 10.3–14.5)
SARS-COV-2 RNA SPEC QL NAA+PROBE: SIGNIFICANT CHANGE UP
SODIUM SERPL-SCNC: 138 MMOL/L — SIGNIFICANT CHANGE UP (ref 135–145)
SODIUM SERPL-SCNC: 138 MMOL/L — SIGNIFICANT CHANGE UP (ref 135–145)
WBC # BLD: 6.6 K/UL — SIGNIFICANT CHANGE UP (ref 3.8–10.5)
WBC # BLD: 6.88 K/UL — SIGNIFICANT CHANGE UP (ref 3.8–10.5)
WBC # FLD AUTO: 6.6 K/UL — SIGNIFICANT CHANGE UP (ref 3.8–10.5)
WBC # FLD AUTO: 6.88 K/UL — SIGNIFICANT CHANGE UP (ref 3.8–10.5)

## 2023-03-26 RX ORDER — SENNA PLUS 8.6 MG/1
2 TABLET ORAL
Qty: 0 | Refills: 0 | DISCHARGE
Start: 2023-03-26

## 2023-03-26 RX ORDER — MAGNESIUM HYDROXIDE 400 MG/1
30 TABLET, CHEWABLE ORAL
Qty: 0 | Refills: 0 | DISCHARGE
Start: 2023-03-26

## 2023-03-26 RX ORDER — ACETAMINOPHEN 500 MG
3 TABLET ORAL
Qty: 0 | Refills: 0 | DISCHARGE
Start: 2023-03-26

## 2023-03-26 RX ORDER — ASCORBIC ACID 60 MG
1 TABLET,CHEWABLE ORAL
Qty: 0 | Refills: 0 | DISCHARGE
Start: 2023-03-26

## 2023-03-26 RX ORDER — PANTOPRAZOLE SODIUM 20 MG/1
1 TABLET, DELAYED RELEASE ORAL
Qty: 0 | Refills: 0 | DISCHARGE
Start: 2023-03-26

## 2023-03-26 RX ORDER — POTASSIUM CHLORIDE 20 MEQ
20 PACKET (EA) ORAL
Refills: 0 | Status: COMPLETED | OUTPATIENT
Start: 2023-03-26 | End: 2023-03-26

## 2023-03-26 RX ORDER — APIXABAN 2.5 MG/1
1 TABLET, FILM COATED ORAL
Qty: 0 | Refills: 0 | DISCHARGE
Start: 2023-03-26

## 2023-03-26 RX ORDER — CEPHALEXIN 500 MG
1 CAPSULE ORAL
Qty: 0 | Refills: 0 | DISCHARGE
Start: 2023-03-26

## 2023-03-26 RX ORDER — POLYETHYLENE GLYCOL 3350 17 G/17G
17 POWDER, FOR SOLUTION ORAL
Qty: 0 | Refills: 0 | DISCHARGE
Start: 2023-03-26

## 2023-03-26 RX ORDER — TRAMADOL HYDROCHLORIDE 50 MG/1
1 TABLET ORAL
Qty: 0 | Refills: 0 | DISCHARGE
Start: 2023-03-26

## 2023-03-26 RX ORDER — FOLIC ACID 0.8 MG
1 TABLET ORAL
Qty: 0 | Refills: 0 | DISCHARGE
Start: 2023-03-26

## 2023-03-26 RX ORDER — OXYCODONE HYDROCHLORIDE 5 MG/1
1 TABLET ORAL
Qty: 0 | Refills: 0 | DISCHARGE
Start: 2023-03-26

## 2023-03-26 RX ADMIN — Medication 975 MILLIGRAM(S): at 21:41

## 2023-03-26 RX ADMIN — Medication 1 TABLET(S): at 11:40

## 2023-03-26 RX ADMIN — APIXABAN 2.5 MILLIGRAM(S): 2.5 TABLET, FILM COATED ORAL at 05:49

## 2023-03-26 RX ADMIN — OXYCODONE HYDROCHLORIDE 5 MILLIGRAM(S): 5 TABLET ORAL at 10:39

## 2023-03-26 RX ADMIN — Medication 20 MILLIGRAM(S): at 05:49

## 2023-03-26 RX ADMIN — Medication 1 TABLET(S): at 14:29

## 2023-03-26 RX ADMIN — Medication 500 MILLIGRAM(S): at 05:49

## 2023-03-26 RX ADMIN — Medication 500 MILLIGRAM(S): at 11:40

## 2023-03-26 RX ADMIN — Medication 500 MILLIGRAM(S): at 18:15

## 2023-03-26 RX ADMIN — Medication 975 MILLIGRAM(S): at 20:41

## 2023-03-26 RX ADMIN — Medication 1: at 08:46

## 2023-03-26 RX ADMIN — Medication 500 MILLIGRAM(S): at 18:16

## 2023-03-26 RX ADMIN — PANTOPRAZOLE SODIUM 40 MILLIGRAM(S): 20 TABLET, DELAYED RELEASE ORAL at 05:50

## 2023-03-26 RX ADMIN — OXYCODONE HYDROCHLORIDE 5 MILLIGRAM(S): 5 TABLET ORAL at 10:09

## 2023-03-26 RX ADMIN — ATORVASTATIN CALCIUM 40 MILLIGRAM(S): 80 TABLET, FILM COATED ORAL at 21:15

## 2023-03-26 RX ADMIN — Medication 500 MILLIGRAM(S): at 23:58

## 2023-03-26 RX ADMIN — Medication 1 TABLET(S): at 05:49

## 2023-03-26 RX ADMIN — Medication 975 MILLIGRAM(S): at 12:33

## 2023-03-26 RX ADMIN — TRAMADOL HYDROCHLORIDE 50 MILLIGRAM(S): 50 TABLET ORAL at 13:36

## 2023-03-26 RX ADMIN — Medication 1 MILLIGRAM(S): at 11:40

## 2023-03-26 RX ADMIN — APIXABAN 2.5 MILLIGRAM(S): 2.5 TABLET, FILM COATED ORAL at 18:16

## 2023-03-26 RX ADMIN — Medication 20 MILLIEQUIVALENT(S): at 14:29

## 2023-03-26 RX ADMIN — AMLODIPINE BESYLATE 5 MILLIGRAM(S): 2.5 TABLET ORAL at 18:15

## 2023-03-26 RX ADMIN — POLYETHYLENE GLYCOL 3350 17 GRAM(S): 17 POWDER, FOR SOLUTION ORAL at 21:15

## 2023-03-26 RX ADMIN — Medication 20 MILLIEQUIVALENT(S): at 16:43

## 2023-03-26 RX ADMIN — OXYCODONE HYDROCHLORIDE 10 MILLIGRAM(S): 5 TABLET ORAL at 17:03

## 2023-03-26 RX ADMIN — Medication 1 TABLET(S): at 21:15

## 2023-03-26 RX ADMIN — Medication 100 MILLIGRAM(S): at 05:50

## 2023-03-26 RX ADMIN — OXYCODONE HYDROCHLORIDE 10 MILLIGRAM(S): 5 TABLET ORAL at 17:33

## 2023-03-26 RX ADMIN — Medication 1: at 12:33

## 2023-03-26 RX ADMIN — SENNA PLUS 2 TABLET(S): 8.6 TABLET ORAL at 21:15

## 2023-03-26 RX ADMIN — TRAMADOL HYDROCHLORIDE 50 MILLIGRAM(S): 50 TABLET ORAL at 14:06

## 2023-03-26 RX ADMIN — Medication 975 MILLIGRAM(S): at 04:29

## 2023-03-26 RX ADMIN — Medication 975 MILLIGRAM(S): at 05:29

## 2023-03-26 NOTE — PROGRESS NOTE ADULT - ASSESSMENT
A/p: 77y Female POD#6 s/p R Total Hip/Knee Arthroplasty.  VSS. NAD.    PT/OT-WBAT in Knee Immobilizer locked in extension  FU AM labs today  Plastic Sx team to assess Prevena Vac, JEFFERSON Drain and Gastroc Flap tomorrow.  Continue with Keflex abx as per ID recommendations  IS  DVT PPx: Eliquis 2.5mg PO BID  Pain Control  Continue Current Tx.  Dispo plan to sub acute rehab    Will Soriano PA-C  Orthopedic Surgery Team  Team Pager: [phone]

## 2023-03-27 ENCOUNTER — TRANSCRIPTION ENCOUNTER (OUTPATIENT)
Age: 78
End: 2023-03-27

## 2023-03-27 VITALS
RESPIRATION RATE: 18 BRPM | DIASTOLIC BLOOD PRESSURE: 75 MMHG | TEMPERATURE: 98 F | HEART RATE: 61 BPM | SYSTOLIC BLOOD PRESSURE: 123 MMHG | OXYGEN SATURATION: 95 %

## 2023-03-27 LAB
ANION GAP SERPL CALC-SCNC: 10 MMOL/L — SIGNIFICANT CHANGE UP (ref 5–17)
BUN SERPL-MCNC: 20 MG/DL — SIGNIFICANT CHANGE UP (ref 7–23)
CALCIUM SERPL-MCNC: 9.2 MG/DL — SIGNIFICANT CHANGE UP (ref 8.4–10.5)
CHLORIDE SERPL-SCNC: 97 MMOL/L — SIGNIFICANT CHANGE UP (ref 96–108)
CO2 SERPL-SCNC: 29 MMOL/L — SIGNIFICANT CHANGE UP (ref 22–31)
CREAT SERPL-MCNC: 0.9 MG/DL — SIGNIFICANT CHANGE UP (ref 0.5–1.3)
EGFR: 65 ML/MIN/1.73M2 — SIGNIFICANT CHANGE UP
GLUCOSE SERPL-MCNC: 250 MG/DL — HIGH (ref 70–99)
HCT VFR BLD CALC: 31.9 % — LOW (ref 34.5–45)
HGB BLD-MCNC: 10.4 G/DL — LOW (ref 11.5–15.5)
MCHC RBC-ENTMCNC: 30.8 PG — SIGNIFICANT CHANGE UP (ref 27–34)
MCHC RBC-ENTMCNC: 32.6 GM/DL — SIGNIFICANT CHANGE UP (ref 32–36)
MCV RBC AUTO: 94.4 FL — SIGNIFICANT CHANGE UP (ref 80–100)
NRBC # BLD: 0 /100 WBCS — SIGNIFICANT CHANGE UP (ref 0–0)
PLATELET # BLD AUTO: 267 K/UL — SIGNIFICANT CHANGE UP (ref 150–400)
POTASSIUM SERPL-MCNC: 4.6 MMOL/L — SIGNIFICANT CHANGE UP (ref 3.5–5.3)
POTASSIUM SERPL-SCNC: 4.6 MMOL/L — SIGNIFICANT CHANGE UP (ref 3.5–5.3)
RBC # BLD: 3.38 M/UL — LOW (ref 3.8–5.2)
RBC # FLD: 12.9 % — SIGNIFICANT CHANGE UP (ref 10.3–14.5)
SODIUM SERPL-SCNC: 136 MMOL/L — SIGNIFICANT CHANGE UP (ref 135–145)
WBC # BLD: 8.03 K/UL — SIGNIFICANT CHANGE UP (ref 3.8–10.5)
WBC # FLD AUTO: 8.03 K/UL — SIGNIFICANT CHANGE UP (ref 3.8–10.5)

## 2023-03-27 PROCEDURE — 99233 SBSQ HOSP IP/OBS HIGH 50: CPT

## 2023-03-27 PROCEDURE — 99232 SBSQ HOSP IP/OBS MODERATE 35: CPT

## 2023-03-27 RX ADMIN — Medication 975 MILLIGRAM(S): at 04:20

## 2023-03-27 RX ADMIN — Medication 100 MILLIGRAM(S): at 08:28

## 2023-03-27 RX ADMIN — OXYCODONE HYDROCHLORIDE 5 MILLIGRAM(S): 5 TABLET ORAL at 08:28

## 2023-03-27 RX ADMIN — APIXABAN 2.5 MILLIGRAM(S): 2.5 TABLET, FILM COATED ORAL at 08:29

## 2023-03-27 RX ADMIN — OXYCODONE HYDROCHLORIDE 10 MILLIGRAM(S): 5 TABLET ORAL at 13:23

## 2023-03-27 RX ADMIN — Medication 975 MILLIGRAM(S): at 04:58

## 2023-03-27 RX ADMIN — Medication 1 MILLIGRAM(S): at 14:21

## 2023-03-27 RX ADMIN — OXYCODONE HYDROCHLORIDE 5 MILLIGRAM(S): 5 TABLET ORAL at 09:30

## 2023-03-27 RX ADMIN — Medication 1 TABLET(S): at 13:22

## 2023-03-27 RX ADMIN — Medication 500 MILLIGRAM(S): at 08:28

## 2023-03-27 RX ADMIN — Medication 1 TABLET(S): at 08:28

## 2023-03-27 RX ADMIN — Medication 500 MILLIGRAM(S): at 05:30

## 2023-03-27 RX ADMIN — OXYCODONE HYDROCHLORIDE 10 MILLIGRAM(S): 5 TABLET ORAL at 14:15

## 2023-03-27 RX ADMIN — Medication 20 MILLIGRAM(S): at 08:28

## 2023-03-27 RX ADMIN — PANTOPRAZOLE SODIUM 40 MILLIGRAM(S): 20 TABLET, DELAYED RELEASE ORAL at 05:30

## 2023-03-27 RX ADMIN — Medication 975 MILLIGRAM(S): at 15:00

## 2023-03-27 RX ADMIN — Medication 975 MILLIGRAM(S): at 14:20

## 2023-03-27 RX ADMIN — Medication 500 MILLIGRAM(S): at 13:22

## 2023-03-27 NOTE — PROGRESS NOTE ADULT - PROBLEM SELECTOR PLAN 4
- mild hypokalemia 3.1 noted  - would replete with KCL PO 40mqq x 2 doses  - repeat BMP in AM
- Resolved with repletion
DVT ppx : Eliquis 2.5mg po bid.
DVT ppx : Eliquis 2.5mg po bid.

## 2023-03-27 NOTE — PROGRESS NOTE ADULT - PROBLEM SELECTOR PROBLEM 1
Prosthetic joint infection

## 2023-03-27 NOTE — DISCHARGE NOTE NURSING/CASE MANAGEMENT/SOCIAL WORK - PATIENT PORTAL LINK FT
You can access the FollowMyHealth Patient Portal offered by [facility] by registering at the following website: [email or web address]. By joining [facility]’s FollowMyHealth portal, you will also be able to view your health information using other applications (apps) compatible with our system.

## 2023-03-27 NOTE — PROGRESS NOTE ADULT - PROBLEM SELECTOR PLAN 2
Hb A1C 6.4 ; on Januvia 100mg po daily ; hold while inpatient  - MAME  - FS QAC QHS  - Carb- consistent diet.
Hb A1C 6.4 ; on Januvia 100mg po daily ; hold while inpatient, restart on discharge  - MAME  - FS QAC QHS  - Carb- consistent diet.

## 2023-03-27 NOTE — PROGRESS NOTE ADULT - REASON FOR ADMISSION
Revision total knee arthroplasty
Right knee prosthetic joint infection
Stage 2 Revision right total knee replacement with plastics gastroc flap/wound closure.
stage 2 revision right total knee replacement with gastroc flap
Stage 2 R TKR PJI replant and Gastroc Flap

## 2023-03-27 NOTE — PROGRESS NOTE ADULT - ASSESSMENT
A/P:  Pt is a 77 yr old female s/p stage 2 revision right Knee Arthroplasty with gastroc flap (plastic sx), POD #7    - Pain control/ Analgesia  - DVT ppx Eliquis, SCD  - PT/OT - wbat/oob in KI  - fu Plastic sx wound check today for plan  - FU OR cx  - appreciate ID recommendations - Keflex x 3 months  - Incentive Spirometer  - GI prophylaxis: Protonix  - notify Ortho for questions   - Dispo planning to [facility], pending wound check today    Verena Leon PA-C  Orthopedic Surgery  Team Pager #[phone]

## 2023-03-27 NOTE — DISCHARGE NOTE NURSING/CASE MANAGEMENT/SOCIAL WORK - NSDCPEFALRISK_GEN_ALL_CORE
For information on Fall & Injury Prevention, visit: [email or web address] OR  [email or web address] OR  https://www.cdc.gov/steadi/patient.html

## 2023-03-27 NOTE — PROGRESS NOTE ADULT - ASSESSMENT
Assessment & Plan  77 year old female s/p revision right total knee replacement, stage 2, with gastroc flap and STSG plastic surgery closure.    Plan:  - Prevena vac removed at bedside, prineo applied to incision  - RAYMOND drain removed  - Dressing: Xeroform + Abd over skin donor site, Xeroform and telfa over skin graft (change daily), gauze over raymond site,  - Cleared from PRS standpoint for discharge  - Care per primary team

## 2023-03-27 NOTE — PROGRESS NOTE ADULT - PROVIDER SPECIALTY LIST ADULT
Hospitalist
Orthopedics
Orthopedics
Plastic Surgery
Orthopedics
Plastic Surgery
Orthopedics
Plastic Surgery
Infectious Disease
Hospitalist
Infectious Disease

## 2023-03-27 NOTE — PROGRESS NOTE ADULT - ASSESSMENT
77F with h/o HTN, Hyperlipidemia, DMT2, OA s/p right TKR at [facility] on 4/8/2022. Complicated by infection and  right total knee arthroplasty explantation with irrigation and debridement and placement of spacer 11/16/2022. Pt was in [facility] for treatment then discharged home. She has completed her course of antibiotics 1/4/2023 and now presents for right revision total knee arthoplasty irrigation and debridement. s/p procedure 3/20 ;

## 2023-03-27 NOTE — PROGRESS NOTE ADULT - SUBJECTIVE AND OBJECTIVE BOX
Post op Day [6]    Patient resting without complaints.  No chest pain, SOB, N/V.    T(C): 36.7 (03-26-23 @ 05:00), Max: 36.8 (03-25-23 @ 08:27)  HR: 65 (03-26-23 @ 05:00) (56 - 66)  BP: 115/70 (03-26-23 @ 05:00) (103/66 - 127/89)  RR: 18 (03-26-23 @ 05:00) (18 - 18)  SpO2: 96% (03-26-23 @ 05:00) (94% - 98%)      Exam:  Alert and Oriented, No Acute Distress  Cardiac: Normal S1 & S2, RRR, No murmurs, rubs or gallops appreciated.  Pulmonary: 18bpm, normal breathing effort, no retractions, diminished lung sounds appreciated.  Bronchial/Vesicular lungs sounds appreciated throughout all lung lobes.  Lower Extremities: R Knee  Knee Immobilizer in place locked in extension  Ace bandage c/d/i, Prevena Vac on suction  Calves Soft, Non-tender bilaterally  +PF/DF/EHL/FHL  SILT  +DP Pulse                  
ASTRID SHONNA  [id number]    Subjective:  Patient is a 77y old  Female who presents with a chief complaint of Stage 2 Revision right total knee replacement with plastics gastroc flap/wound closure. (22 Mar 2023 06:40)     Patient seen and examined at bedside. Patient with no new concerns. Having some right foot swelling. Denies fevers, chills, CP, dyspnea, dizziness, nausea, vomiting.    Objective:  T(C): 36.8 (03-22-23 @ 04:43), Max: 36.9 (03-21-23 @ 21:26)  HR: 90 (03-22-23 @ 04:43) (58 - 98)  BP: 120/63 (03-22-23 @ 04:43) (96/49 - 125/67)  RR: 18 (03-22-23 @ 04:43) (16 - 18)  SpO2: 96% (03-22-23 @ 04:43) (92% - 100%)  Wt(kg): --   03-21    137  |  104  |  18  ----------------------------<  208<H>  5.0   |  22  |  1.10    Ca    7.4<L>      21 Mar 2023 02:55                          9.1    14.89 )-----------( 161      ( 21 Mar 2023 02:55 )             27.8       03-21 @ 07:01  -  03-22 @ 07:00  --------------------------------------------------------  IN: 2050 mL / OUT: 4140 mL / NET: -2090 mL      PHYSICAL EXAM:    General: NAD  Extremity: Right Lower extremity swelling, wrapped in ACE and in knee immobilizer, JEFFERSON drain SS, vac holding suction.            MEDICATIONS  (STANDING):  acetaminophen     Tablet .. 975 milliGRAM(s) Oral every 8 hours  amLODIPine   Tablet 5 milliGRAM(s) Oral daily  apixaban 2.5 milliGRAM(s) Oral two times a day  ascorbic acid 500 milliGRAM(s) Oral two times a day  atorvastatin 40 milliGRAM(s) Oral at bedtime  calcium carbonate 1250 mG  + Vitamin D (OsCal 500 + D) 1 Tablet(s) Oral three times a day  ceFAZolin   IVPB 2000 milliGRAM(s) IV Intermittent every 8 hours  dextrose 5%. 1000 milliLiter(s) (100 mL/Hr) IV Continuous <Continuous>  dextrose 5%. 1000 milliLiter(s) (50 mL/Hr) IV Continuous <Continuous>  dextrose 50% Injectable 25 Gram(s) IV Push once  dextrose 50% Injectable 12.5 Gram(s) IV Push once  dextrose 50% Injectable 25 Gram(s) IV Push once  folic acid 1 milliGRAM(s) Oral daily  glucagon  Injectable 1 milliGRAM(s) IntraMuscular once  HYDROmorphone  Injectable 0.5 milliGRAM(s) IV Push once  insulin lispro (ADMELOG) corrective regimen sliding scale   SubCutaneous three times a day before meals  insulin lispro (ADMELOG) corrective regimen sliding scale   SubCutaneous at bedtime  metoprolol succinate  milliGRAM(s) Oral daily  multivitamin 1 Tablet(s) Oral daily  pantoprazole    Tablet 40 milliGRAM(s) Oral before breakfast  phenylephrine    Infusion 0.3 MICROgram(s)/kG/Min (6.64 mL/Hr) IV Continuous <Continuous>  polyethylene glycol 3350 17 Gram(s) Oral at bedtime  senna 2 Tablet(s) Oral at bedtime  sodium chloride 0.9%. 1000 milliLiter(s) (100 mL/Hr) IV Continuous <Continuous>    MEDICATIONS  (PRN):  aluminum hydroxide/magnesium hydroxide/simethicone Suspension 30 milliLiter(s) Oral four times a day PRN Indigestion  bisacodyl Suppository 10 milliGRAM(s) Rectal once PRN Constipation  dextrose Oral Gel 15 Gram(s) Oral once PRN Blood Glucose LESS THAN 70 milliGRAM(s)/deciliter  magnesium hydroxide Suspension 30 milliLiter(s) Oral daily PRN Constipation  ondansetron Injectable 4 milliGRAM(s) IV Push every 6 hours PRN Nausea and/or Vomiting  oxyCODONE    IR 5 milliGRAM(s) Oral every 4 hours PRN Moderate Pain (4 - 6)  oxyCODONE    IR 10 milliGRAM(s) Oral every 4 hours PRN Severe Pain (7 - 10)  traMADol 50 milliGRAM(s) Oral every 6 hours PRN Mild Pain (1 - 3)          
Ortho Progress Note    S: Patient seen and examined. No acute events overnight. Pain well controlled with current regimen. Denies lightheadedness/dizziness, CP/SOB. Tolerating diet. Received 2 units pRBC yesterday      O:  Physical Exam:  Gen: Laying in bed, NAD, alert and oriented.   Resp: Unlabored breathing  Ext: RLE- dressing c/d/i, [place] in place, drains intact w/ serosanguinous output           EHL/FHL/TA/Sol intact          + SILT DP/SP/PINEDA/Sa/T          +DP, extremity WWP    Vital Signs Last 24 Hrs  T(C): 36.8 (23 Mar 2023 04:42), Max: 37 (22 Mar 2023 17:14)  T(F): 98.2 (23 Mar 2023 04:42), Max: 98.6 (22 Mar 2023 17:14)  HR: 97 (23 Mar 2023 05:24) (70 - 97)  BP: 151/83 (23 Mar 2023 05:24) (102/59 - 151/83)  BP(mean): 94 (22 Mar 2023 20:56) (94 - 94)  RR: 18 (23 Mar 2023 04:42) (18 - 20)  SpO2: 94% (23 Mar 2023 04:42) (92% - 96%)    Parameters below as of 23 Mar 2023 04:42  Patient On (Oxygen Delivery Method): room air                              7.9    12.66 )-----------( 131      ( 22 Mar 2023 16:35 )             23.6                         6.8    13.43 )-----------( 145      ( 22 Mar 2023 10:27 )             20.7       03-22    138  |  107  |  21  ----------------------------<  162<H>  4.4   |  22  |  1.17                
Plastic Surgery Progress Note (pg [facility]: [id number], [facility]: [phone])    SUBJECTIVE  The patient was seen and examined. Denies SOB/CP/N/V/D.    OBJECTIVE  ___________________________________________________  VITAL SIGNS / I&O's   Vital Signs Last 24 Hrs  T(C): 36.5 (24 Mar 2023 04:59), Max: 37.3 (23 Mar 2023 20:15)  T(F): 97.7 (24 Mar 2023 04:59), Max: 99.2 (23 Mar 2023 20:15)  HR: 98 (24 Mar 2023 06:07) (69 - 98)  BP: 132/85 (24 Mar 2023 06:07) (102/60 - 161/77)  BP(mean): 101 (24 Mar 2023 06:07) (101 - 101)  RR: 18 (24 Mar 2023 04:59) (17 - 18)  SpO2: 98% (24 Mar 2023 04:59) (92% - 98%)    Parameters below as of 24 Mar 2023 04:59  Patient On (Oxygen Delivery Method): room air          23 Mar 2023 07:01  -  24 Mar 2023 07:00  --------------------------------------------------------  IN:    Oral Fluid: 440 mL  Total IN: 440 mL    OUT:    Bulb (mL): 20 mL    Voided (mL): 1700 mL  Total OUT: 1720 mL    Total NET: -1280 mL        ___________________________________________________  PHYSICAL EXAM      General: NAD  Extremity: Right Lower extremity swelling, wrapped in ACE and in knee immobilizer, JEFFERSON drain SS, vac holding suction.      ___________________________________________________  LABS                        10.6   9.61  )-----------( 163      ( 24 Mar 2023 06:16 )             31.0     24 Mar 2023 06:17    136    |  98     |  21     ----------------------------<  140    3.1     |  29     |  1.09     Ca    8.7        24 Mar 2023 06:17        CAPILLARY BLOOD GLUCOSE      POCT Blood Glucose.: 132 mg/dL (23 Mar 2023 21:03)  POCT Blood Glucose.: 156 mg/dL (23 Mar 2023 16:34)  POCT Blood Glucose.: 163 mg/dL (23 Mar 2023 12:26)  POCT Blood Glucose.: 172 mg/dL (23 Mar 2023 07:39)          ___________________________________________________  MICRO  Recent Cultures:  Specimen Source: .Tissue Other, 03-20 @ 21:58; Results   Culture is being performed.; Gram Stain:   Rare polymorphonuclear leukocytes seen per low power field  No organisms seen per oil power field; Organism: --  Specimen Source: .Tissue Other, 03-20 @ 21:55; Results   Culture is being performed.; Gram Stain:   Rare polymorphonuclear leukocytes seen per low power field  No organisms seen per oil power field; Organism: --  Specimen Source: .Tissue Other, 03-20 @ 21:53; Results   Culture is being performed.; Gram Stain:   Rare polymorphonuclear leukocytes seen per low power field  No organisms seen per oil power field; Organism: --  Specimen Source: .Tissue Other, 03-20 @ 21:51; Results   Culture is being performed.; Gram Stain:   Rare polymorphonuclear leukocytes seen per low power field  No organisms seen per oil power field; Organism: --  Specimen Source: Knee Synovial Fluid, 03-20 @ 21:48; Results   No growth; Gram Stain:   polymorphonuclear leukocytes seen  No organisms seen  by cytocentrifuge; Organism: --    ___________________________________________________  MEDICATIONS  (STANDING):  acetaminophen     Tablet .. 975 milliGRAM(s) Oral every 8 hours  amLODIPine   Tablet 5 milliGRAM(s) Oral daily  apixaban 2.5 milliGRAM(s) Oral two times a day  ascorbic acid 500 milliGRAM(s) Oral two times a day  atorvastatin 40 milliGRAM(s) Oral at bedtime  calcium carbonate 1250 mG  + Vitamin D (OsCal 500 + D) 1 Tablet(s) Oral three times a day  ceFAZolin   IVPB 2000 milliGRAM(s) IV Intermittent every 8 hours  dextrose 5%. 1000 milliLiter(s) (100 mL/Hr) IV Continuous <Continuous>  dextrose 5%. 1000 milliLiter(s) (50 mL/Hr) IV Continuous <Continuous>  dextrose 50% Injectable 25 Gram(s) IV Push once  dextrose 50% Injectable 12.5 Gram(s) IV Push once  dextrose 50% Injectable 25 Gram(s) IV Push once  folic acid 1 milliGRAM(s) Oral daily  furosemide    Tablet 20 milliGRAM(s) Oral daily  glucagon  Injectable 1 milliGRAM(s) IntraMuscular once  HYDROmorphone  Injectable 0.5 milliGRAM(s) IV Push once  insulin lispro (ADMELOG) corrective regimen sliding scale   SubCutaneous three times a day before meals  insulin lispro (ADMELOG) corrective regimen sliding scale   SubCutaneous at bedtime  metoprolol succinate  milliGRAM(s) Oral daily  multivitamin 1 Tablet(s) Oral daily  pantoprazole    Tablet 40 milliGRAM(s) Oral before breakfast  polyethylene glycol 3350 17 Gram(s) Oral at bedtime  senna 2 Tablet(s) Oral at bedtime  sodium chloride 0.9%. 1000 milliLiter(s) (100 mL/Hr) IV Continuous <Continuous>    MEDICATIONS  (PRN):  aluminum hydroxide/magnesium hydroxide/simethicone Suspension 30 milliLiter(s) Oral four times a day PRN Indigestion  bisacodyl Suppository 10 milliGRAM(s) Rectal once PRN Constipation  dextrose Oral Gel 15 Gram(s) Oral once PRN Blood Glucose LESS THAN 70 milliGRAM(s)/deciliter  magnesium hydroxide Suspension 30 milliLiter(s) Oral daily PRN Constipation  ondansetron Injectable 4 milliGRAM(s) IV Push every 6 hours PRN Nausea and/or Vomiting  oxyCODONE    IR 5 milliGRAM(s) Oral every 4 hours PRN Moderate Pain (4 - 6)  oxyCODONE    IR 10 milliGRAM(s) Oral every 4 hours PRN Severe Pain (7 - 10)  traMADol 50 milliGRAM(s) Oral every 6 hours PRN Mild Pain (1 - 3)        
ASTRID KILPATRICK  [id number]    Subjective:  Patient is a 77y old  Female who presents with a chief complaint of stage 2 revision right total knee replacement with gastroc flap (27 Mar 2023 06:13)     Patient seen and examined at bedside. Patient with no new concerns. Denies fevers, chills, CP, dyspnea, dizziness, nausea, vomiting.    Objective:  T(C): 36.6 (03-27-23 @ 04:15), Max: 36.7 (03-27-23 @ 00:00)  HR: 65 (03-27-23 @ 04:15) (57 - 65)  BP: 112/69 (03-27-23 @ 04:15) (104/65 - 142/84)  RR: 18 (03-27-23 @ 04:15) (18 - 18)  SpO2: 94% (03-27-23 @ 04:15) (94% - 98%)  Wt(kg): --   03-26    138  |  97  |  21  ----------------------------<  120<H>  3.4<L>   |  29  |  0.90    Ca    9.0      26 Mar 2023 07:45                          10.0   6.88  )-----------( 231      ( 26 Mar 2023 07:45 )             31.0       03-26 @ 07:01  -  03-27 @ 07:00  --------------------------------------------------------  IN: 700 mL / OUT: 2.7 mL / NET: 697.3 mL      PHYSICAL EXAM:    General:    General: NAD  Extremity: Right Lower extremity swelling, wrapped in ACE and in knee immobilizer, JEFFERSON drain SS, vac holding suction.          MEDICATIONS  (STANDING):  acetaminophen     Tablet .. 975 milliGRAM(s) Oral every 8 hours  amLODIPine   Tablet 5 milliGRAM(s) Oral daily  apixaban 2.5 milliGRAM(s) Oral two times a day  ascorbic acid 500 milliGRAM(s) Oral two times a day  atorvastatin 40 milliGRAM(s) Oral at bedtime  calcium carbonate 1250 mG  + Vitamin D (OsCal 500 + D) 1 Tablet(s) Oral three times a day  cephalexin 500 milliGRAM(s) Oral four times a day  dextrose 5%. 1000 milliLiter(s) (50 mL/Hr) IV Continuous <Continuous>  dextrose 5%. 1000 milliLiter(s) (100 mL/Hr) IV Continuous <Continuous>  dextrose 50% Injectable 25 Gram(s) IV Push once  dextrose 50% Injectable 12.5 Gram(s) IV Push once  dextrose 50% Injectable 25 Gram(s) IV Push once  folic acid 1 milliGRAM(s) Oral daily  furosemide    Tablet 20 milliGRAM(s) Oral daily  glucagon  Injectable 1 milliGRAM(s) IntraMuscular once  HYDROmorphone  Injectable 0.5 milliGRAM(s) IV Push once  insulin lispro (ADMELOG) corrective regimen sliding scale   SubCutaneous three times a day before meals  insulin lispro (ADMELOG) corrective regimen sliding scale   SubCutaneous at bedtime  metoprolol succinate  milliGRAM(s) Oral daily  multivitamin 1 Tablet(s) Oral daily  pantoprazole    Tablet 40 milliGRAM(s) Oral before breakfast  polyethylene glycol 3350 17 Gram(s) Oral at bedtime  senna 2 Tablet(s) Oral at bedtime    MEDICATIONS  (PRN):  aluminum hydroxide/magnesium hydroxide/simethicone Suspension 30 milliLiter(s) Oral four times a day PRN Indigestion  bisacodyl Suppository 10 milliGRAM(s) Rectal once PRN Constipation  dextrose Oral Gel 15 Gram(s) Oral once PRN Blood Glucose LESS THAN 70 milliGRAM(s)/deciliter  magnesium hydroxide Suspension 30 milliLiter(s) Oral daily PRN Constipation  ondansetron Injectable 4 milliGRAM(s) IV Push every 6 hours PRN Nausea and/or Vomiting  oxyCODONE    IR 5 milliGRAM(s) Oral every 4 hours PRN Moderate Pain (4 - 6)  oxyCODONE    IR 10 milliGRAM(s) Oral every 4 hours PRN Severe Pain (7 - 10)  traMADol 50 milliGRAM(s) Oral every 6 hours PRN Mild Pain (1 - 3)          
ORTHO PROGRESS NOTE     Patient is status post stage 2 revision right total knee replacement and plastics closure/gastroc flap, POD #2.   Pt seen and examined at bedside, denies SOB, CP, Dizziness. N/V/D/HA.    Patient reports moderate pain at this time.   No significant overnight events. Patient ambulated with PT, recommended for subacute rehab.     Vital Signs Last 24 Hrs  T(C): 36.8 (22 Mar 2023 04:43), Max: 36.9 (21 Mar 2023 21:26)  T(F): 98.2 (22 Mar 2023 04:43), Max: 98.4 (21 Mar 2023 21:26)  HR: 90 (22 Mar 2023 04:43) (58 - 98)  BP: 120/63 (22 Mar 2023 04:43) (96/49 - 125/67)  BP(mean): 76 (21 Mar 2023 22:26) (70 - 83)  RR: 18 (22 Mar 2023 04:43) (16 - 18)  SpO2: 96% (22 Mar 2023 04:43) (92% - 100%)    Parameters below as of 22 Mar 2023 04:43  Patient On (Oxygen Delivery Method): room air        Exam:   Gen: No apparent distress  RLE:  Prevena vac and [place] compression dressing with knee immobilizer intact to right knee.   BLE: motor intact EHL/FHL/TA/GS.  Sensation is grossly intact to light touch in the distal extremities.    Compartments are soft bilaterally.  Extremities are warm .  DP 2+ BLE     Labs:  CBC Full  -  ( 21 Mar 2023 02:55 )  WBC Count : 14.89 K/uL  RBC Count : 2.94 M/uL  Hemoglobin : 9.1 g/dL  Hematocrit : 27.8 %  Platelet Count - Automated : 161 K/uL  Mean Cell Volume : 94.6 fl  Mean Cell Hemoglobin : 31.0 pg  Mean Cell Hemoglobin Concentration : 32.7 gm/dL        03-21    137  |  104  |  18  ----------------------------<  208<H>  5.0   |  22  |  1.10    Ca    7.4<L>      21 Mar 2023 02:55         A/P:  Pt is a 77 yr old female s/p revision right total knee replacement, stage 2, with gastroc flap/plastic surgery closure, POD #2    - Pain control/ Analgesia  - DVT ppx Eliquis 2.5 mg BID x 4 weeks, foot pumps  - IVAC  - Monitor JEFFERSON output with plastic surgery.   - PT/OT - wbat/oob - in knee immobilizer. [place] ordered - locked in extension with settings 0-30  - Incentive Spirometer  - GI prophylaxis: Protonix  - Appreciate ID recs - ancef q8 standing at this time. pending OR cultures, currently NGTD  - Apprecate medicine recs.   - FU AM labs  - notify Ortho for questions   - Dispo planning to [facility] pending hospital course.     Verena Leon PA-C  Orthopedic Surgery  Team Pager #[phone]
Patient comfortable  No complaints    T(C): 36.8 (03-25-23 @ 08:27), Max: 36.8 (03-24-23 @ 11:48)  HR: 57 (03-25-23 @ 08:27) (57 - 71)  BP: 108/66 (03-25-23 @ 08:27) (108/66 - 125/66)  RR: 18 (03-25-23 @ 08:27) (18 - 18)  SpO2: 96% (03-25-23 @ 08:27) (94% - 100%)  JEFFERSON=10/10    PHYSICAL EXAM:  NAD, Alert and oriented X3  Ext: RLE: [place] LIE, JEFFERSON and Prevena in place, ACE Dressing C/D/I; compartments soft, dull sensation grossly intact to light touch; (+) DF/PF; (+) Distal Pulses; No Calf tenderness B/L    LABS:                        10.6   9.61  )-----------( 163      ( 24 Mar 2023 06:16 )             31.0     03-24    136  |  98  |  21  ----------------------------<  140<H>  3.1<L>   |  29  |  1.09    Ca    8.7      24 Mar 2023 06:17                  
Patient is a 77y old  Female who presents with a chief complaint of Stage 2 Revision right total knee replacement with plastics gastroc flap/wound closure. (22 Mar 2023 06:40)      SUBJECTIVE / OVERNIGHT EVENTS: Pt seen and examined. No acute events overnight. She states that right knee pain is fairly. Denies fever/chills.    MEDICATIONS  (STANDING):  acetaminophen     Tablet .. 975 milliGRAM(s) Oral every 8 hours  amLODIPine   Tablet 5 milliGRAM(s) Oral daily  apixaban 2.5 milliGRAM(s) Oral two times a day  ascorbic acid 500 milliGRAM(s) Oral two times a day  atorvastatin 40 milliGRAM(s) Oral at bedtime  calcium carbonate 1250 mG  + Vitamin D (OsCal 500 + D) 1 Tablet(s) Oral three times a day  ceFAZolin   IVPB 2000 milliGRAM(s) IV Intermittent every 8 hours  dextrose 5%. 1000 milliLiter(s) (50 mL/Hr) IV Continuous <Continuous>  dextrose 5%. 1000 milliLiter(s) (100 mL/Hr) IV Continuous <Continuous>  dextrose 50% Injectable 25 Gram(s) IV Push once  dextrose 50% Injectable 12.5 Gram(s) IV Push once  dextrose 50% Injectable 25 Gram(s) IV Push once  folic acid 1 milliGRAM(s) Oral daily  glucagon  Injectable 1 milliGRAM(s) IntraMuscular once  HYDROmorphone  Injectable 0.5 milliGRAM(s) IV Push once  insulin lispro (ADMELOG) corrective regimen sliding scale   SubCutaneous three times a day before meals  insulin lispro (ADMELOG) corrective regimen sliding scale   SubCutaneous at bedtime  metoprolol succinate  milliGRAM(s) Oral daily  multivitamin 1 Tablet(s) Oral daily  pantoprazole    Tablet 40 milliGRAM(s) Oral before breakfast  polyethylene glycol 3350 17 Gram(s) Oral at bedtime  senna 2 Tablet(s) Oral at bedtime  sodium chloride 0.9%. 1000 milliLiter(s) (100 mL/Hr) IV Continuous <Continuous>    MEDICATIONS  (PRN):  aluminum hydroxide/magnesium hydroxide/simethicone Suspension 30 milliLiter(s) Oral four times a day PRN Indigestion  bisacodyl Suppository 10 milliGRAM(s) Rectal once PRN Constipation  dextrose Oral Gel 15 Gram(s) Oral once PRN Blood Glucose LESS THAN 70 milliGRAM(s)/deciliter  magnesium hydroxide Suspension 30 milliLiter(s) Oral daily PRN Constipation  ondansetron Injectable 4 milliGRAM(s) IV Push every 6 hours PRN Nausea and/or Vomiting  oxyCODONE    IR 5 milliGRAM(s) Oral every 4 hours PRN Moderate Pain (4 - 6)  oxyCODONE    IR 10 milliGRAM(s) Oral every 4 hours PRN Severe Pain (7 - 10)  traMADol 50 milliGRAM(s) Oral every 6 hours PRN Mild Pain (1 - 3)      Vital Signs Last 24 Hrs  T(C): 36.7 (22 Mar 2023 13:04), Max: 36.9 (21 Mar 2023 21:26)  T(F): 98.1 (22 Mar 2023 13:04), Max: 98.4 (21 Mar 2023 21:26)  HR: 75 (22 Mar 2023 13:04) (69 - 97)  BP: 104/55 (22 Mar 2023 13:04) (97/59 - 125/67)  BP(mean): 76 (21 Mar 2023 22:26) (76 - 83)  RR: 18 (22 Mar 2023 13:04) (16 - 18)  SpO2: 96% (22 Mar 2023 13:04) (92% - 97%)    Parameters below as of 22 Mar 2023 13:04  Patient On (Oxygen Delivery Method): room air      CAPILLARY BLOOD GLUCOSE      POCT Blood Glucose.: 181 mg/dL (22 Mar 2023 11:48)  POCT Blood Glucose.: 177 mg/dL (22 Mar 2023 07:56)  POCT Blood Glucose.: 162 mg/dL (21 Mar 2023 21:59)  POCT Blood Glucose.: 161 mg/dL (21 Mar 2023 17:26)    I&O's Summary    21 Mar 2023 07:01  -  22 Mar 2023 07:00  --------------------------------------------------------  IN: 2050 mL / OUT: 4140 mL / NET: -2090 mL    22 Mar 2023 07:01  -  22 Mar 2023 14:25  --------------------------------------------------------  IN: 0 mL / OUT: 1570 mL / NET: -1570 mL        PHYSICAL EXAM:  GENERAL: NAD, well-groomed  HEAD:  Atraumatic, Normocephalic  EYES:  conjunctiva and sclera clear  NECK: Supple, No JVD  CHEST/LUNG: Clear to auscultation bilaterally; No wheeze  HEART: Regular rate and rhythm; No murmurs, rubs, or gallops  ABDOMEN: Soft, Nontender, Nondistended; Bowel sounds present  EXTREMITIES:  Right knee dressing CDI  PSYCH: AAOx3  NEUROLOGY: non-focal  SKIN: No rashes or lesions    LABS:                        6.8    13.43 )-----------( 145      ( 22 Mar 2023 10:27 )             20.7     03-22    138  |  107  |  21  ----------------------------<  162<H>  4.4   |  22  |  1.17    Ca    7.9<L>      22 Mar 2023 09:29                
ORTHO PROGRESS NOTE     Patient is status post stage 2 revision right total knee replacement with gastroc flap, POD #7  Pt seen and examined at bedside, denies SOB, CP, Dizziness. N/V/D/HA.    No significant overnight events. Pain well controlled. Patient reports daily ambulation with PT, recommended for [facility]    Vital Signs Last 24 Hrs  T(C): 36.6 (27 Mar 2023 04:15), Max: 36.7 (27 Mar 2023 00:00)  T(F): 97.9 (27 Mar 2023 04:15), Max: 98 (27 Mar 2023 00:00)  HR: 65 (27 Mar 2023 04:15) (57 - 65)  BP: 112/69 (27 Mar 2023 04:15) (104/65 - 142/84)  BP(mean): --  RR: 18 (27 Mar 2023 04:15) (18 - 18)  SpO2: 94% (27 Mar 2023 04:15) (94% - 98%)    Parameters below as of 27 Mar 2023 04:15  Patient On (Oxygen Delivery Method): room air        Exam:   Gen: No apparent distress  RLE: [place] to the right knee, dressing clean dry and intact. JEFFERSON intact with minimal drainage. (2.5/2.5)  Prevena vac hooked to wound vac  BLE: motor intact EHL/FHL/TA/GS.   Sensation is grossly intact to light touch in the distal extremities.    Compartments are soft bilaterally.  Extremities are warm .  DP 2+ BLE       Labs:  CBC Full  -  ( 26 Mar 2023 07:45 )  WBC Count : 6.88 K/uL  RBC Count : 3.31 M/uL  Hemoglobin : 10.0 g/dL  Hematocrit : 31.0 %  Platelet Count - Automated : 231 K/uL  Mean Cell Volume : 93.7 fl  Mean Cell Hemoglobin : 30.2 pg  Mean Cell Hemoglobin Concentration : 32.3 gm/dL        03-26    138  |  97  |  21  ----------------------------<  120<H>  3.4<L>   |  29  |  0.90    Ca    9.0      26 Mar 2023 07:45         
Patient is a 77y old  Female who presents with a chief complaint of Stage 2 Revision right total knee replacement with plastics gastroc flap/wound closure POD#4  Patient comfortable  No complaints    T(C): 36.5 (03-24-23 @ 04:59), Max: 37.3 (03-23-23 @ 20:15)  HR: 98 (03-24-23 @ 06:07) (69 - 98)  BP: 132/85 (03-24-23 @ 06:07) (102/60 - 161/77)  RR: 18 (03-24-23 @ 04:59) (17 - 18)  SpO2: 98% (03-24-23 @ 04:59) (92% - 98%)    PHYSICAL EXAM:  NAD, Alert  [Left ] Knee: Vac Dressing in place; sensation grossly intact(+) DF/PF; (+) Distal Pulses; No Calf tenderness     LABS:                      10.6   9.61  )-----------( 163      ( 24 Mar 2023 06:16 )             31.0   03-24  136  |  98  |  21  ----------------------------<  140<H>  3.1<L>   |  29  |  1.09  Ca    8.7      24 Mar 2023 06:17            
[facility] Division of Hospital Medicine  Richy RegineDO  Pager (MARIEL, 5F-3U): [phone]  Other Times:  [phone]    Patient is a 77y old  Female who presents with a chief complaint of stage 2 revision right total knee replacement with gastroc flap (27 Mar 2023 06:13)    SUBJECTIVE / OVERNIGHT EVENTS: No acute events overnight. Patient seen and examined at bedside this morning, feels well, denies chest pain, shortness of breath, fevers, R leg pain or any other symptoms.    REVIEW OF SYSTEMS:    CONSTITUTIONAL: No weakness, fevers or chills  EYES/ENT: No visual changes;  No vertigo or throat pain   NECK: No pain or stiffness  RESPIRATORY: No cough, wheezing, hemoptysis; No shortness of breath  CARDIOVASCULAR: No chest pain or palpitations  GASTROINTESTINAL: No abdominal or epigastric pain. No nausea, vomiting, or hematemesis; No diarrhea or constipation. No melena or hematochezia.  GENITOURINARY: No dysuria, frequency or hematuria  NEUROLOGICAL: No numbness or weakness  SKIN: No itching, burning, rashes, or lesions  MSK: No joint pain, no back pain  HEME: No easy bleeding, no easy bruising  All other review of systems is negative unless indicated above.    MEDICATIONS  (STANDING):  acetaminophen     Tablet .. 975 milliGRAM(s) Oral every 8 hours  amLODIPine   Tablet 5 milliGRAM(s) Oral daily  apixaban 2.5 milliGRAM(s) Oral two times a day  ascorbic acid 500 milliGRAM(s) Oral two times a day  atorvastatin 40 milliGRAM(s) Oral at bedtime  calcium carbonate 1250 mG  + Vitamin D (OsCal 500 + D) 1 Tablet(s) Oral three times a day  cephalexin 500 milliGRAM(s) Oral four times a day  dextrose 5%. 1000 milliLiter(s) (100 mL/Hr) IV Continuous <Continuous>  dextrose 5%. 1000 milliLiter(s) (50 mL/Hr) IV Continuous <Continuous>  dextrose 50% Injectable 25 Gram(s) IV Push once  dextrose 50% Injectable 12.5 Gram(s) IV Push once  dextrose 50% Injectable 25 Gram(s) IV Push once  folic acid 1 milliGRAM(s) Oral daily  furosemide    Tablet 20 milliGRAM(s) Oral daily  glucagon  Injectable 1 milliGRAM(s) IntraMuscular once  HYDROmorphone  Injectable 0.5 milliGRAM(s) IV Push once  insulin lispro (ADMELOG) corrective regimen sliding scale   SubCutaneous three times a day before meals  insulin lispro (ADMELOG) corrective regimen sliding scale   SubCutaneous at bedtime  metoprolol succinate  milliGRAM(s) Oral daily  multivitamin 1 Tablet(s) Oral daily  pantoprazole    Tablet 40 milliGRAM(s) Oral before breakfast  polyethylene glycol 3350 17 Gram(s) Oral at bedtime  senna 2 Tablet(s) Oral at bedtime    MEDICATIONS  (PRN):  aluminum hydroxide/magnesium hydroxide/simethicone Suspension 30 milliLiter(s) Oral four times a day PRN Indigestion  bisacodyl Suppository 10 milliGRAM(s) Rectal once PRN Constipation  dextrose Oral Gel 15 Gram(s) Oral once PRN Blood Glucose LESS THAN 70 milliGRAM(s)/deciliter  magnesium hydroxide Suspension 30 milliLiter(s) Oral daily PRN Constipation  ondansetron Injectable 4 milliGRAM(s) IV Push every 6 hours PRN Nausea and/or Vomiting  oxyCODONE    IR 5 milliGRAM(s) Oral every 4 hours PRN Moderate Pain (4 - 6)  oxyCODONE    IR 10 milliGRAM(s) Oral every 4 hours PRN Severe Pain (7 - 10)  traMADol 50 milliGRAM(s) Oral every 6 hours PRN Mild Pain (1 - 3)      CAPILLARY BLOOD GLUCOSE      POCT Blood Glucose.: 142 mg/dL (27 Mar 2023 11:39)  POCT Blood Glucose.: 145 mg/dL (27 Mar 2023 07:52)  POCT Blood Glucose.: 195 mg/dL (26 Mar 2023 21:34)  POCT Blood Glucose.: 133 mg/dL (26 Mar 2023 18:35)    I&O's Summary    26 Mar 2023 07:01  -  27 Mar 2023 07:00  --------------------------------------------------------  IN: 700 mL / OUT: 2.7 mL / NET: 697.3 mL    27 Mar 2023 07:01  -  27 Mar 2023 14:22  --------------------------------------------------------  IN: 600 mL / OUT: 250 mL / NET: 350 mL        PHYSICAL EXAM:  Vital Signs Last 24 Hrs  T(C): 36.6 (27 Mar 2023 11:41), Max: 36.7 (27 Mar 2023 00:00)  T(F): 97.9 (27 Mar 2023 11:41), Max: 98 (27 Mar 2023 00:00)  HR: 61 (27 Mar 2023 11:41) (57 - 65)  BP: 123/75 (27 Mar 2023 11:41) (104/65 - 132/71)  BP(mean): --  RR: 18 (27 Mar 2023 11:41) (18 - 18)  SpO2: 95% (27 Mar 2023 11:41) (94% - 98%)    Parameters below as of 27 Mar 2023 11:41  Patient On (Oxygen Delivery Method): room air      CONSTITUTIONAL: NAD, well-developed, well-groomed  EYES: EOMI; conjunctiva and sclera clear  ENMT: Moist oral mucosa, no pharyngeal injection or exudates  RESPIRATORY: Normal respiratory effort; lungs are clear to auscultation bilaterally  CARDIOVASCULAR: Regular rate and rhythm, normal S1 and S2, no murmur/rub/gallop  ABDOMEN: Soft, nontender to palpation, nondistended, no rebound/guarding  MUSCULOSKELETAL: RLE ACE bandaged and in brace  PSYCH: A+O to person, place, and time; affect appropriate  NEUROLOGY: CN 2-12 are intact and symmetric; no gross sensory deficits   SKIN: No rashes; no palpable lesions    LABS:                        10.4   8.03  )-----------( 267      ( 27 Mar 2023 10:23 )             31.9     03-27    136  |  97  |  20  ----------------------------<  250<H>  4.6   |  29  |  0.90    Ca    9.2      27 Mar 2023 10:23  
Patient is a 77y old  Female who presents with a chief complaint of Right knee prosthetic joint infection (24 Mar 2023 07:26)      SUBJECTIVE / OVERNIGHT EVENTS: Pt seen and examined. No acute events overnight. She denies cp, sob. She states that right knee pain is well controlled. Denies fever/chills.     MEDICATIONS  (STANDING):  acetaminophen     Tablet .. 975 milliGRAM(s) Oral every 8 hours  amLODIPine   Tablet 5 milliGRAM(s) Oral daily  apixaban 2.5 milliGRAM(s) Oral two times a day  ascorbic acid 500 milliGRAM(s) Oral two times a day  atorvastatin 40 milliGRAM(s) Oral at bedtime  calcium carbonate 1250 mG  + Vitamin D (OsCal 500 + D) 1 Tablet(s) Oral three times a day  ceFAZolin   IVPB 2000 milliGRAM(s) IV Intermittent every 8 hours  dextrose 5%. 1000 milliLiter(s) (100 mL/Hr) IV Continuous <Continuous>  dextrose 5%. 1000 milliLiter(s) (50 mL/Hr) IV Continuous <Continuous>  dextrose 50% Injectable 25 Gram(s) IV Push once  dextrose 50% Injectable 12.5 Gram(s) IV Push once  dextrose 50% Injectable 25 Gram(s) IV Push once  folic acid 1 milliGRAM(s) Oral daily  furosemide    Tablet 20 milliGRAM(s) Oral daily  glucagon  Injectable 1 milliGRAM(s) IntraMuscular once  HYDROmorphone  Injectable 0.5 milliGRAM(s) IV Push once  insulin lispro (ADMELOG) corrective regimen sliding scale   SubCutaneous three times a day before meals  insulin lispro (ADMELOG) corrective regimen sliding scale   SubCutaneous at bedtime  metoprolol succinate  milliGRAM(s) Oral daily  multivitamin 1 Tablet(s) Oral daily  pantoprazole    Tablet 40 milliGRAM(s) Oral before breakfast  polyethylene glycol 3350 17 Gram(s) Oral at bedtime  potassium chloride    Tablet ER 20 milliEquivalent(s) Oral every 2 hours  senna 2 Tablet(s) Oral at bedtime  sodium chloride 0.9%. 1000 milliLiter(s) (100 mL/Hr) IV Continuous <Continuous>    MEDICATIONS  (PRN):  aluminum hydroxide/magnesium hydroxide/simethicone Suspension 30 milliLiter(s) Oral four times a day PRN Indigestion  bisacodyl Suppository 10 milliGRAM(s) Rectal once PRN Constipation  dextrose Oral Gel 15 Gram(s) Oral once PRN Blood Glucose LESS THAN 70 milliGRAM(s)/deciliter  magnesium hydroxide Suspension 30 milliLiter(s) Oral daily PRN Constipation  ondansetron Injectable 4 milliGRAM(s) IV Push every 6 hours PRN Nausea and/or Vomiting  oxyCODONE    IR 5 milliGRAM(s) Oral every 4 hours PRN Moderate Pain (4 - 6)  oxyCODONE    IR 10 milliGRAM(s) Oral every 4 hours PRN Severe Pain (7 - 10)  traMADol 50 milliGRAM(s) Oral every 6 hours PRN Mild Pain (1 - 3)      Vital Signs Last 24 Hrs  T(C): 36.6 (24 Mar 2023 07:47), Max: 37.3 (23 Mar 2023 20:15)  T(F): 97.9 (24 Mar 2023 07:47), Max: 99.2 (23 Mar 2023 20:15)  HR: 66 (24 Mar 2023 07:47) (66 - 98)  BP: 130/69 (24 Mar 2023 07:47) (102/60 - 161/77)  BP(mean): 101 (24 Mar 2023 06:07) (101 - 101)  RR: 18 (24 Mar 2023 07:47) (17 - 18)  SpO2: 96% (24 Mar 2023 07:47) (94% - 98%)    Parameters below as of 24 Mar 2023 07:47  Patient On (Oxygen Delivery Method): room air      CAPILLARY BLOOD GLUCOSE      POCT Blood Glucose.: 154 mg/dL (24 Mar 2023 07:31)  POCT Blood Glucose.: 132 mg/dL (23 Mar 2023 21:03)  POCT Blood Glucose.: 156 mg/dL (23 Mar 2023 16:34)  POCT Blood Glucose.: 163 mg/dL (23 Mar 2023 12:26)    I&O's Summary    23 Mar 2023 07:01  -  24 Mar 2023 07:00  --------------------------------------------------------  IN: 440 mL / OUT: 1720 mL / NET: -1280 mL    24 Mar 2023 07:01  -  24 Mar 2023 10:20  --------------------------------------------------------  IN: 0 mL / OUT: 800 mL / NET: -800 mL        PHYSICAL EXAM:  GENERAL: NAD, well-groomed  HEAD:  Atraumatic, Normocephalic  EYES:  conjunctiva and sclera clear  NECK: Supple, No JVD  CHEST/LUNG: Clear to auscultation bilaterally; No wheeze  HEART: Regular rate and rhythm; No murmurs, rubs, or gallops  ABDOMEN: Soft, Nontender, Nondistended; Bowel sounds present  EXTREMITIES:  Right knee dressing CDI  PSYCH: AAOx3  NEUROLOGY: non-focal  SKIN: No rashes or lesions    LABS:                        10.6   9.61  )-----------( 163      ( 24 Mar 2023 06:16 )             31.0     03-24    136  |  98  |  21  ----------------------------<  140<H>  3.1<L>   |  29  |  1.09    Ca    8.7      24 Mar 2023 06:17                    
Patient is a 77y old  Female who presents with a chief complaint of Stage 2 Revision right total knee replacement with plastics gastroc flap/wound closure. (22 Mar 2023 06:40)      SUBJECTIVE / OVERNIGHT EVENTS:  Pt seen and examined. No acute events overnight. She denies cp, sob. n/v, fever/chills.    MEDICATIONS  (STANDING):  acetaminophen     Tablet .. 975 milliGRAM(s) Oral every 8 hours  amLODIPine   Tablet 5 milliGRAM(s) Oral daily  apixaban 2.5 milliGRAM(s) Oral two times a day  ascorbic acid 500 milliGRAM(s) Oral two times a day  atorvastatin 40 milliGRAM(s) Oral at bedtime  calcium carbonate 1250 mG  + Vitamin D (OsCal 500 + D) 1 Tablet(s) Oral three times a day  ceFAZolin   IVPB 2000 milliGRAM(s) IV Intermittent every 8 hours  dextrose 5%. 1000 milliLiter(s) (100 mL/Hr) IV Continuous <Continuous>  dextrose 5%. 1000 milliLiter(s) (50 mL/Hr) IV Continuous <Continuous>  dextrose 50% Injectable 25 Gram(s) IV Push once  dextrose 50% Injectable 12.5 Gram(s) IV Push once  dextrose 50% Injectable 25 Gram(s) IV Push once  folic acid 1 milliGRAM(s) Oral daily  furosemide    Tablet 20 milliGRAM(s) Oral daily  glucagon  Injectable 1 milliGRAM(s) IntraMuscular once  HYDROmorphone  Injectable 0.5 milliGRAM(s) IV Push once  insulin lispro (ADMELOG) corrective regimen sliding scale   SubCutaneous three times a day before meals  insulin lispro (ADMELOG) corrective regimen sliding scale   SubCutaneous at bedtime  metoprolol succinate  milliGRAM(s) Oral daily  multivitamin 1 Tablet(s) Oral daily  pantoprazole    Tablet 40 milliGRAM(s) Oral before breakfast  polyethylene glycol 3350 17 Gram(s) Oral at bedtime  senna 2 Tablet(s) Oral at bedtime  sodium chloride 0.9%. 1000 milliLiter(s) (100 mL/Hr) IV Continuous <Continuous>    MEDICATIONS  (PRN):  aluminum hydroxide/magnesium hydroxide/simethicone Suspension 30 milliLiter(s) Oral four times a day PRN Indigestion  bisacodyl Suppository 10 milliGRAM(s) Rectal once PRN Constipation  dextrose Oral Gel 15 Gram(s) Oral once PRN Blood Glucose LESS THAN 70 milliGRAM(s)/deciliter  magnesium hydroxide Suspension 30 milliLiter(s) Oral daily PRN Constipation  ondansetron Injectable 4 milliGRAM(s) IV Push every 6 hours PRN Nausea and/or Vomiting  oxyCODONE    IR 5 milliGRAM(s) Oral every 4 hours PRN Moderate Pain (4 - 6)  oxyCODONE    IR 10 milliGRAM(s) Oral every 4 hours PRN Severe Pain (7 - 10)  traMADol 50 milliGRAM(s) Oral every 6 hours PRN Mild Pain (1 - 3)      Vital Signs Last 24 Hrs  T(C): 36.6 (23 Mar 2023 08:12), Max: 37 (22 Mar 2023 17:14)  T(F): 97.9 (23 Mar 2023 08:12), Max: 98.6 (22 Mar 2023 17:14)  HR: 77 (23 Mar 2023 11:07) (70 - 97)  BP: 131/78 (23 Mar 2023 11:07) (102/59 - 151/83)  BP(mean): 94 (22 Mar 2023 20:56) (94 - 94)  RR: 17 (23 Mar 2023 11:07) (17 - 20)  SpO2: 94% (23 Mar 2023 11:07) (92% - 96%)    Parameters below as of 23 Mar 2023 11:07  Patient On (Oxygen Delivery Method): room air      CAPILLARY BLOOD GLUCOSE      POCT Blood Glucose.: 163 mg/dL (23 Mar 2023 12:26)  POCT Blood Glucose.: 172 mg/dL (23 Mar 2023 07:39)  POCT Blood Glucose.: 149 mg/dL (22 Mar 2023 21:11)  POCT Blood Glucose.: 192 mg/dL (22 Mar 2023 17:46)    I&O's Summary    22 Mar 2023 07:01  -  23 Mar 2023 07:00  --------------------------------------------------------  IN: 320 mL / OUT: 4870 mL / NET: -4550 mL    23 Mar 2023 07:01  -  23 Mar 2023 12:26  --------------------------------------------------------  IN: 120 mL / OUT: 0 mL / NET: 120 mL        PHYSICAL EXAM:  GENERAL: NAD, well-groomed  HEAD:  Atraumatic, Normocephalic  EYES:  conjunctiva and sclera clear  NECK: Supple, No JVD  CHEST/LUNG: Clear to auscultation bilaterally; No wheeze  HEART: Regular rate and rhythm; No murmurs, rubs, or gallops  ABDOMEN: Soft, Nontender, Nondistended; Bowel sounds present  EXTREMITIES:  Right knee dressing CDI  PSYCH: AAOx3  NEUROLOGY: non-focal  SKIN: No rashes or lesions      LABS:                        11.2   13.58 )-----------( 155      ( 23 Mar 2023 07:32 )             32.7     03-23    137  |  102  |  20  ----------------------------<  141<H>  3.6   |  24  |  1.12    Ca    8.8      23 Mar 2023 07:32                
ASTRID KILPATRICK 77y [id number]    Patient is a 77y old  Female who presents with a chief complaint of stage 2 revision right total knee replacement with gastroc flap (27 Mar 2023 06:13)      Follow Up/CC:  ID following for PJI    Interval History/ROS: no fever, for rehab, has some knee pain     Allergies    [This allergen will not trigger allergy alert] solriamfetol hydrochloride (Unknown)  penicillins (Short breath (Severe))  vancomycin (Rash)    Intolerances        ANTIMICROBIALS:  cephalexin 500 four times a day      MEDICATIONS  (STANDING):  acetaminophen     Tablet .. 975 milliGRAM(s) Oral every 8 hours  amLODIPine   Tablet 5 milliGRAM(s) Oral daily  apixaban 2.5 milliGRAM(s) Oral two times a day  ascorbic acid 500 milliGRAM(s) Oral two times a day  atorvastatin 40 milliGRAM(s) Oral at bedtime  calcium carbonate 1250 mG  + Vitamin D (OsCal 500 + D) 1 Tablet(s) Oral three times a day  cephalexin 500 milliGRAM(s) Oral four times a day  dextrose 5%. 1000 milliLiter(s) (100 mL/Hr) IV Continuous <Continuous>  dextrose 5%. 1000 milliLiter(s) (50 mL/Hr) IV Continuous <Continuous>  dextrose 50% Injectable 25 Gram(s) IV Push once  dextrose 50% Injectable 12.5 Gram(s) IV Push once  dextrose 50% Injectable 25 Gram(s) IV Push once  folic acid 1 milliGRAM(s) Oral daily  furosemide    Tablet 20 milliGRAM(s) Oral daily  glucagon  Injectable 1 milliGRAM(s) IntraMuscular once  HYDROmorphone  Injectable 0.5 milliGRAM(s) IV Push once  insulin lispro (ADMELOG) corrective regimen sliding scale   SubCutaneous three times a day before meals  insulin lispro (ADMELOG) corrective regimen sliding scale   SubCutaneous at bedtime  metoprolol succinate  milliGRAM(s) Oral daily  multivitamin 1 Tablet(s) Oral daily  pantoprazole    Tablet 40 milliGRAM(s) Oral before breakfast  polyethylene glycol 3350 17 Gram(s) Oral at bedtime  senna 2 Tablet(s) Oral at bedtime    MEDICATIONS  (PRN):  aluminum hydroxide/magnesium hydroxide/simethicone Suspension 30 milliLiter(s) Oral four times a day PRN Indigestion  bisacodyl Suppository 10 milliGRAM(s) Rectal once PRN Constipation  dextrose Oral Gel 15 Gram(s) Oral once PRN Blood Glucose LESS THAN 70 milliGRAM(s)/deciliter  magnesium hydroxide Suspension 30 milliLiter(s) Oral daily PRN Constipation  ondansetron Injectable 4 milliGRAM(s) IV Push every 6 hours PRN Nausea and/or Vomiting  oxyCODONE    IR 5 milliGRAM(s) Oral every 4 hours PRN Moderate Pain (4 - 6)  oxyCODONE    IR 10 milliGRAM(s) Oral every 4 hours PRN Severe Pain (7 - 10)  traMADol 50 milliGRAM(s) Oral every 6 hours PRN Mild Pain (1 - 3)        Vital Signs Last 24 Hrs  T(C): 36.6 (27 Mar 2023 11:41), Max: 36.7 (27 Mar 2023 00:00)  T(F): 97.9 (27 Mar 2023 11:41), Max: 98 (27 Mar 2023 00:00)  HR: 61 (27 Mar 2023 11:41) (57 - 65)  BP: 123/75 (27 Mar 2023 11:41) (104/65 - 132/71)  BP(mean): --  RR: 18 (27 Mar 2023 11:41) (18 - 18)  SpO2: 95% (27 Mar 2023 11:41) (94% - 98%)    Parameters below as of 27 Mar 2023 11:41  Patient On (Oxygen Delivery Method): room air        CBC Full  -  ( 27 Mar 2023 10:23 )  WBC Count : 8.03 K/uL  RBC Count : 3.38 M/uL  Hemoglobin : 10.4 g/dL  Hematocrit : 31.9 %  Platelet Count - Automated : 267 K/uL  Mean Cell Volume : 94.4 fl  Mean Cell Hemoglobin : 30.8 pg  Mean Cell Hemoglobin Concentration : 32.6 gm/dL  Auto Neutrophil # : x  Auto Lymphocyte # : x  Auto Monocyte # : x  Auto Eosinophil # : x  Auto Basophil # : x  Auto Neutrophil % : x  Auto Lymphocyte % : x  Auto Monocyte % : x  Auto Eosinophil % : x  Auto Basophil % : x    03-27    136  |  97  |  20  ----------------------------<  250<H>  4.6   |  29  |  0.90    Ca    9.2      27 Mar 2023 10:23            MICROBIOLOGY:  .Tissue Other  03-20-23   Culture is being performed.  --    Rare polymorphonuclear leukocytes seen per low power field  No organisms seen per oil power field      .Tissue Other  03-20-23   Culture is being performed.  --    Rare polymorphonuclear leukocytes seen per low power field  No organisms seen per oil power field      .Tissue Other  03-20-23   Culture is being performed.  --    Rare polymorphonuclear leukocytes seen per low power field  No organisms seen per oil power field      .Tissue Other  03-20-23   Culture is being performed.  --    Rare polymorphonuclear leukocytes seen per low power field  No organisms seen per oil power field      Knee Synovial Fluid  03-20-23   No growth at 5 days  --    polymorphonuclear leukocytes seen  No organisms seen  by cytocentrifuge        RADIOLOGY    < from: Xray Knee 1 or 2 Views, Right (03.20.23 @ 23:01) >  Unconstrained longstem revision right total knee prosthesis implanted.    Intact and aligned hardware and no periprosthetic fractures.    Postoperative soft tissue changes.    Few proximal medial calf surgical clips noted. Wound VAC suction device   also present over anterior knee.    Correlate with intraoperative findings.    < end of copied text >  
ASTRID KILPATRICK 77y [id number]    Patient is a 77y old  Female who presents with a chief complaint of Right knee prosthetic joint infection (24 Mar 2023 07:26)      Follow Up/CC:  ID following for PJI    Interval History/ROS: no fever, right knee pain controlled      Allergies    [This allergen will not trigger allergy alert] solriamfetol hydrochloride (Unknown)  penicillins (Short breath (Severe))  vancomycin (Rash)    Intolerances        ANTIMICROBIALS:  cephalexin 500 four times a day      MEDICATIONS  (STANDING):  acetaminophen     Tablet .. 975 milliGRAM(s) Oral every 8 hours  amLODIPine   Tablet 5 milliGRAM(s) Oral daily  apixaban 2.5 milliGRAM(s) Oral two times a day  ascorbic acid 500 milliGRAM(s) Oral two times a day  atorvastatin 40 milliGRAM(s) Oral at bedtime  calcium carbonate 1250 mG  + Vitamin D (OsCal 500 + D) 1 Tablet(s) Oral three times a day  cephalexin 500 milliGRAM(s) Oral four times a day  dextrose 5%. 1000 milliLiter(s) (100 mL/Hr) IV Continuous <Continuous>  dextrose 5%. 1000 milliLiter(s) (50 mL/Hr) IV Continuous <Continuous>  dextrose 50% Injectable 25 Gram(s) IV Push once  dextrose 50% Injectable 12.5 Gram(s) IV Push once  dextrose 50% Injectable 25 Gram(s) IV Push once  folic acid 1 milliGRAM(s) Oral daily  furosemide    Tablet 20 milliGRAM(s) Oral daily  glucagon  Injectable 1 milliGRAM(s) IntraMuscular once  HYDROmorphone  Injectable 0.5 milliGRAM(s) IV Push once  insulin lispro (ADMELOG) corrective regimen sliding scale   SubCutaneous three times a day before meals  insulin lispro (ADMELOG) corrective regimen sliding scale   SubCutaneous at bedtime  metoprolol succinate  milliGRAM(s) Oral daily  multivitamin 1 Tablet(s) Oral daily  pantoprazole    Tablet 40 milliGRAM(s) Oral before breakfast  polyethylene glycol 3350 17 Gram(s) Oral at bedtime  potassium chloride    Tablet ER 20 milliEquivalent(s) Oral every 2 hours  senna 2 Tablet(s) Oral at bedtime  sodium chloride 0.9%. 1000 milliLiter(s) (100 mL/Hr) IV Continuous <Continuous>    MEDICATIONS  (PRN):  aluminum hydroxide/magnesium hydroxide/simethicone Suspension 30 milliLiter(s) Oral four times a day PRN Indigestion  bisacodyl Suppository 10 milliGRAM(s) Rectal once PRN Constipation  dextrose Oral Gel 15 Gram(s) Oral once PRN Blood Glucose LESS THAN 70 milliGRAM(s)/deciliter  magnesium hydroxide Suspension 30 milliLiter(s) Oral daily PRN Constipation  ondansetron Injectable 4 milliGRAM(s) IV Push every 6 hours PRN Nausea and/or Vomiting  oxyCODONE    IR 5 milliGRAM(s) Oral every 4 hours PRN Moderate Pain (4 - 6)  oxyCODONE    IR 10 milliGRAM(s) Oral every 4 hours PRN Severe Pain (7 - 10)  traMADol 50 milliGRAM(s) Oral every 6 hours PRN Mild Pain (1 - 3)        Vital Signs Last 24 Hrs  T(C): 36.6 (24 Mar 2023 07:47), Max: 37.3 (23 Mar 2023 20:15)  T(F): 97.9 (24 Mar 2023 07:47), Max: 99.2 (23 Mar 2023 20:15)  HR: 66 (24 Mar 2023 07:47) (66 - 98)  BP: 130/69 (24 Mar 2023 07:47) (102/60 - 161/77)  BP(mean): 101 (24 Mar 2023 06:07) (101 - 101)  RR: 18 (24 Mar 2023 07:47) (18 - 18)  SpO2: 96% (24 Mar 2023 07:47) (94% - 98%)    Parameters below as of 24 Mar 2023 07:47  Patient On (Oxygen Delivery Method): room air        CBC Full  -  ( 24 Mar 2023 06:16 )  WBC Count : 9.61 K/uL  RBC Count : 3.38 M/uL  Hemoglobin : 10.6 g/dL  Hematocrit : 31.0 %  Platelet Count - Automated : 163 K/uL  Mean Cell Volume : 91.7 fl  Mean Cell Hemoglobin : 31.4 pg  Mean Cell Hemoglobin Concentration : 34.2 gm/dL  Auto Neutrophil # : x  Auto Lymphocyte # : x  Auto Monocyte # : x  Auto Eosinophil # : x  Auto Basophil # : x  Auto Neutrophil % : x  Auto Lymphocyte % : x  Auto Monocyte % : x  Auto Eosinophil % : x  Auto Basophil % : x    03-24    136  |  98  |  21  ----------------------------<  140<H>  3.1<L>   |  29  |  1.09    Ca    8.7      24 Mar 2023 06:17            MICROBIOLOGY:  .Tissue Other  03-20-23   Culture is being performed.  --    Rare polymorphonuclear leukocytes seen per low power field  No organisms seen per oil power field      .Tissue Other  03-20-23   Culture is being performed.  --    Rare polymorphonuclear leukocytes seen per low power field  No organisms seen per oil power field      .Tissue Other  03-20-23   Culture is being performed.  --    Rare polymorphonuclear leukocytes seen per low power field  No organisms seen per oil power field      .Tissue Other  03-20-23   Culture is being performed.  --    Rare polymorphonuclear leukocytes seen per low power field  No organisms seen per oil power field      Knee Synovial Fluid  03-20-23   No growth  --    polymorphonuclear leukocytes seen  No organisms seen  by cytocentrifuge        RADIOLOGY    < from: Xray Knee 1 or 2 Views, Right (03.20.23 @ 23:01) >  Unconstrained longstem revision right total knee prosthesis implanted.    Intact and aligned hardware and no periprosthetic fractures.    Postoperative soft tissue changes.    Few proximal medial calf surgical clips noted. Wound VAC suction device   also present over anterior knee.    Correlate with intraoperative findings.    < end of copied text >

## 2023-03-27 NOTE — PROGRESS NOTE ADULT - NEGATIVE SKIN SYMPTOMS
Pt spent the rest of the shift calm. According to pt, he feel much better.  Report endorsed to Alyssa Bell LPN
no rash/no itching
no rash/no itching

## 2023-03-27 NOTE — PROGRESS NOTE ADULT - ASSESSMENT
77 year old female s/p right TKR at [facility] on 4/8/2022. S/P infection and  right total knee arthroplasty explantation with irrigation and debridement and placement of spacer 11/16/2022, was in rehab for treatment, now home. She has complete her course of antibiotics 1/4/2023 and now presents for right revision total knee arthoplasty irrigation. and debridement.    John Holley  Attending Physician   Division of Infectious Disease  Office #[phone]  Available on [employer] Teams also  After 5pm/weekend or no response, call #[phone]

## 2023-03-27 NOTE — PROGRESS NOTE ADULT - PROBLEM SELECTOR PLAN 1
s/p right revision total knee arthoplasty irrigation. and debridement 3/20  - well tolerated  - pain control per primary team  - c/w Ancef 2 gm iv q8 per ID reccs  -recent outpt joint cultures were negative.
s/p right revision total knee arthoplasty irrigation. and debridement 3/20  - well tolerated  - pain control per primary team  - c/w Ancef 2 gm iv q8 given h/o mssa   - ID following  -recent outpt joint cultures were negative.
s/p right revision total knee arthoplasty irrigation. and debridement 3/20  - well tolerated  - pain control per primary team  - c/w Ancef 2 gm iv q8 given h/o mssa   - ID following  -recent outpt joint cultures were negative.
s/p right revision total knee arthoplasty irrigation. and debridement 3/20  - well tolerated  - pain control per primary team  - continue Kefelx per ID recs
-s/p stage 2 revision of right TKA, s/p flap   -cx negative  -no fever  -on ancef  -cont keflex 500 mg po q6 x 3 months given prior MSSA  -potential side effects of abx explained including GI, Cdiff, allergy issues, development of resistance, etc.  -local care per ortho and plastics  -f/u in ID office in 2 months [phone]
-s/p stage 2 revision of right TKA, s/p flap   -cx negative  -no fever  -on ancef  -change to keflex 500 mg po q6 x 3 months given prior MSSA  -potential side effects of abx explained including GI, Cdiff, allergy issues, development of resistance, etc.  -local care per ortho and plastics

## 2023-03-27 NOTE — PROGRESS NOTE ADULT - NSPROGADDITIONALINFOA_GEN_ALL_CORE
d/w Roderick Albarado
d/w Roderick Albarado
Discussed with orthopedics LANA Albarado
d/w Roderick Albarado
D/w Ortho PA

## 2023-03-27 NOTE — PROGRESS NOTE ADULT - PROBLEM SELECTOR PLAN 3
home meds are Amlodipine 5mg po daily and Metoprolol ER 100mg po daily  - c/w home meds with hold parameters ( hold for SBP < 110).

## 2023-04-03 LAB
CULTURE RESULTS: SIGNIFICANT CHANGE UP
SPECIMEN SOURCE: SIGNIFICANT CHANGE UP

## 2023-04-04 ENCOUNTER — APPOINTMENT (OUTPATIENT)
Dept: PLASTIC SURGERY | Facility: CLINIC | Age: 78
End: 2023-04-04
Payer: MEDICARE

## 2023-04-04 VITALS
WEIGHT: 120 LBS | DIASTOLIC BLOOD PRESSURE: 84 MMHG | RESPIRATION RATE: 17 BRPM | SYSTOLIC BLOOD PRESSURE: 127 MMHG | HEIGHT: 60 IN | HEART RATE: 77 BPM | OXYGEN SATURATION: 98 % | BODY MASS INDEX: 23.56 KG/M2 | TEMPERATURE: 97.8 F

## 2023-04-04 PROCEDURE — 99024 POSTOP FOLLOW-UP VISIT: CPT

## 2023-04-05 ENCOUNTER — APPOINTMENT (OUTPATIENT)
Dept: ORTHOPEDIC SURGERY | Facility: CLINIC | Age: 78
End: 2023-04-05
Payer: MEDICARE

## 2023-04-05 VITALS
HEART RATE: 79 BPM | BODY MASS INDEX: 23.56 KG/M2 | DIASTOLIC BLOOD PRESSURE: 73 MMHG | WEIGHT: 120 LBS | SYSTOLIC BLOOD PRESSURE: 137 MMHG | HEIGHT: 60 IN

## 2023-04-05 PROCEDURE — 73564 X-RAY EXAM KNEE 4 OR MORE: CPT | Mod: 26,RT

## 2023-04-05 PROCEDURE — 99024 POSTOP FOLLOW-UP VISIT: CPT

## 2023-04-05 NOTE — PHYSICAL EXAM
[de-identified] : Well developed, well nourished in no apparent distress, awake, alert and orientated to person, place and time with appropriate mood and affect\par Respirations are even and unlabored. Gait evaluation does not reveal a limp. There is no inguinal adenopathy. The affected limb is well-perfused with palpable pedal pulse, without skin lesions, shows a grossly normal motor and sensory examination. Incision is CDI. Knee motion is 0-60. [de-identified] : AP, lateral, sunrise knee x-rays of the right knee were ordered and obtained in the office and demonstrate satisfactory position and alignment of the components are present. No signs of loosening are seen.

## 2023-04-05 NOTE — HISTORY OF PRESENT ILLNESS
[de-identified] : Status-post revision right  total knee  arthroplasty and skin graft here for initial postoperative evaluation. Excellent progress is noted in terms of pain and restoration of function. Pain is well controlled with oral medications. There has been no change in medical health since discharge. The patient does require assistive devices.

## 2023-04-05 NOTE — DISCUSSION/SUMMARY
[de-identified] : The patient is doing well after joint replacement surgery. Written infectious precautions were reviewed. The patient will progress with physical therapy at this time and they will work on transitioning from requiring assistive devices for ambulation. Anti-coagulant therapy will be discontinued at 1 month post surgery for the purpose of orthopedic thromboembolism prophylaxis. Return around the 6 week anniversary from surgery for follow-up evaluation. \par \par We will discontinue the brace.  She was seen by Dr. Chavez yesterday and he stated that he is okay with discontinuing the brace at this point. \par She will continue wound care per plastics.\par She is weightbearing as tolerated and range of motion as tolerated.  She will follow-up with us in a month

## 2023-04-10 NOTE — REASON FOR VISIT
[Post Op: _________] : a [unfilled] post op visit [FreeTextEntry1] : s/p right total knee arthroplasty explantation with irrigation and debridement DOS: 03/20/23.

## 2023-04-19 LAB
CULTURE RESULTS: SIGNIFICANT CHANGE UP
SPECIMEN SOURCE: SIGNIFICANT CHANGE UP

## 2023-04-26 ENCOUNTER — APPOINTMENT (OUTPATIENT)
Dept: PLASTIC SURGERY | Facility: CLINIC | Age: 78
End: 2023-04-26
Payer: MEDICARE

## 2023-04-26 VITALS
RESPIRATION RATE: 17 BRPM | DIASTOLIC BLOOD PRESSURE: 75 MMHG | SYSTOLIC BLOOD PRESSURE: 127 MMHG | OXYGEN SATURATION: 98 % | BODY MASS INDEX: 23.56 KG/M2 | WEIGHT: 120 LBS | HEART RATE: 51 BPM | HEIGHT: 60 IN

## 2023-04-26 PROCEDURE — 99024 POSTOP FOLLOW-UP VISIT: CPT

## 2023-04-27 ENCOUNTER — NON-APPOINTMENT (OUTPATIENT)
Age: 78
End: 2023-04-27

## 2023-04-27 ENCOUNTER — APPOINTMENT (OUTPATIENT)
Dept: ORTHOPEDIC SURGERY | Facility: CLINIC | Age: 78
End: 2023-04-27
Payer: MEDICARE

## 2023-04-27 VITALS — WEIGHT: 120 LBS | BODY MASS INDEX: 23.56 KG/M2 | HEIGHT: 60 IN

## 2023-04-27 DIAGNOSIS — Z96.651 PRESENCE OF RIGHT ARTIFICIAL KNEE JOINT: ICD-10-CM

## 2023-04-27 DIAGNOSIS — T84.53XD INFECTION AND INFLAMMATORY REACTION DUE TO INTERNAL RIGHT KNEE PROSTHESIS, SUBSEQUENT ENCOUNTER: ICD-10-CM

## 2023-04-27 DIAGNOSIS — T84.50XA INFECTION AND INFLAMMATORY REACTION DUE TO UNSPECIFIED INTERNAL JOINT PROSTHESIS, INITIAL ENCOUNTER: ICD-10-CM

## 2023-04-27 PROCEDURE — 99024 POSTOP FOLLOW-UP VISIT: CPT

## 2023-04-27 NOTE — DISCUSSION/SUMMARY
[de-identified] : The patient is doing well after joint replacement surgery. WBAT. Return around the 6 month anniversary from surgery for follow-up evaluation. She will continue wound care per plastics. She is weightbearing as tolerated and range of motion as tolerated.  She will follow-up with us in 6 weeks.

## 2023-04-27 NOTE — HISTORY OF PRESENT ILLNESS
[de-identified] : Status-post revision right  total knee  arthroplasty and skin graft here for routine postoperative evaluation. Excellent progress is noted in terms of pain and restoration of function. Pain is well controlled with oral medications. There has been no change in medical health since discharge. The patient does require assistive devices.
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
## 2023-04-27 NOTE — PHYSICAL EXAM
[de-identified] : Well developed, well nourished in no apparent distress, awake, alert and orientated to person, place and time with appropriate mood and affect\par Respirations are even and unlabored. Gait evaluation does not reveal a limp. There is no inguinal adenopathy. The affected limb is well-perfused with palpable pedal pulse, without skin lesions, shows a grossly normal motor and sensory examination. Incision is CDI and healing well. Knee motion is 0-95

## 2023-05-02 ENCOUNTER — NON-APPOINTMENT (OUTPATIENT)
Age: 78
End: 2023-05-02

## 2023-05-02 ENCOUNTER — APPOINTMENT (OUTPATIENT)
Dept: PLASTIC SURGERY | Facility: CLINIC | Age: 78
End: 2023-05-02
Payer: MEDICARE

## 2023-05-02 VITALS
BODY MASS INDEX: 23.56 KG/M2 | HEART RATE: 55 BPM | OXYGEN SATURATION: 98 % | HEIGHT: 60 IN | DIASTOLIC BLOOD PRESSURE: 73 MMHG | SYSTOLIC BLOOD PRESSURE: 126 MMHG | TEMPERATURE: 97.9 F | WEIGHT: 120 LBS

## 2023-05-02 PROCEDURE — 99024 POSTOP FOLLOW-UP VISIT: CPT

## 2023-05-02 NOTE — REASON FOR VISIT
[Post Op: _________] : a [unfilled] post op visit [Family Member] : family member [FreeTextEntry1] : s/p right total knee arthroplasty explantation with irrigation and debridement DOS: 03/20/23.

## 2023-05-03 NOTE — REASON FOR VISIT
[Post Op: _________] : a [unfilled] post op visit [Family Member] : family member [FreeTextEntry1] : s/p right total knee arthroplasty explantation with irrigation and debridement DOS: 03/20/23. \par

## 2023-05-06 LAB
CULTURE RESULTS: SIGNIFICANT CHANGE UP
SPECIMEN SOURCE: SIGNIFICANT CHANGE UP

## 2023-05-09 ENCOUNTER — APPOINTMENT (OUTPATIENT)
Dept: PLASTIC SURGERY | Facility: CLINIC | Age: 78
End: 2023-05-09
Payer: MEDICARE

## 2023-05-09 VITALS
OXYGEN SATURATION: 99 % | HEART RATE: 51 BPM | TEMPERATURE: 97.7 F | BODY MASS INDEX: 23.56 KG/M2 | HEIGHT: 60 IN | SYSTOLIC BLOOD PRESSURE: 135 MMHG | DIASTOLIC BLOOD PRESSURE: 76 MMHG | WEIGHT: 120 LBS

## 2023-05-09 PROCEDURE — 99024 POSTOP FOLLOW-UP VISIT: CPT

## 2023-05-09 NOTE — REASON FOR VISIT
[Post Op: _________] : a [unfilled] post op visit [Family Member] : family member [FreeTextEntry1] : s/p right total knee arthroplasty explantation with right medial gastrocnemius muscle flap and STSG reconstruction DOS: 03/20/23.

## 2023-05-12 RX ORDER — CEPHALEXIN 500 MG/1
500 TABLET ORAL
Qty: 120 | Refills: 0 | Status: ACTIVE | COMMUNITY
Start: 2023-04-23 | End: 1900-01-01

## 2023-05-19 ENCOUNTER — APPOINTMENT (OUTPATIENT)
Dept: PLASTIC SURGERY | Facility: CLINIC | Age: 78
End: 2023-05-19
Payer: MEDICARE

## 2023-05-19 VITALS
HEIGHT: 60 IN | OXYGEN SATURATION: 98 % | TEMPERATURE: 98.3 F | HEART RATE: 58 BPM | DIASTOLIC BLOOD PRESSURE: 74 MMHG | BODY MASS INDEX: 23.56 KG/M2 | SYSTOLIC BLOOD PRESSURE: 137 MMHG | WEIGHT: 120 LBS

## 2023-05-19 PROCEDURE — 99024 POSTOP FOLLOW-UP VISIT: CPT

## 2023-05-24 ENCOUNTER — APPOINTMENT (OUTPATIENT)
Dept: INFECTIOUS DISEASE | Facility: CLINIC | Age: 78
End: 2023-05-24
Payer: MEDICARE

## 2023-05-24 VITALS
DIASTOLIC BLOOD PRESSURE: 53 MMHG | TEMPERATURE: 97.6 F | BODY MASS INDEX: 24.35 KG/M2 | WEIGHT: 124 LBS | OXYGEN SATURATION: 98 % | HEART RATE: 67 BPM | HEIGHT: 60 IN | SYSTOLIC BLOOD PRESSURE: 153 MMHG

## 2023-05-24 DIAGNOSIS — A49.01 METHICILLIN SUSCEPTIBLE STAPHYLOCOCCUS AUREUS INFECTION, UNSPECIFIED SITE: ICD-10-CM

## 2023-05-24 PROCEDURE — 99212 OFFICE O/P EST SF 10 MIN: CPT

## 2023-05-24 NOTE — PHYSICAL EXAM
[General Appearance - Alert] : alert [Sclera] : the sclera and conjunctiva were normal [General Appearance - In No Acute Distress] : in no acute distress [PERRL With Normal Accommodation] : pupils were equal in size, round, reactive to light [Extraocular Movements] : extraocular movements were intact [Outer Ear] : the ears and nose were normal in appearance [Oropharynx] : the oropharynx was normal with no thrush [Neck Appearance] : the appearance of the neck was normal [Neck Cervical Mass (___cm)] : no neck mass was observed [Jugular Venous Distention Increased] : there was no jugular-venous distention [Thyroid Diffuse Enlargement] : the thyroid was not enlarged [Auscultation Breath Sounds / Voice Sounds] : lungs were clear to auscultation bilaterally [Heart Sounds] : normal S1 and S2 [Full Pulse] : the pedal pulses are present [Edema] : there was no peripheral edema [Bowel Sounds] : normal bowel sounds [Abdomen Soft] : soft [Abdomen Tenderness] : non-tender [Abdomen Mass (___ Cm)] : no abdominal mass palpated [Costovertebral Angle Tenderness] : no CVA tenderness [FreeTextEntry1] : right knee incision intact, healing, no redness [Skin Color & Pigmentation] : normal skin color and pigmentation [] : no rash [No Focal Deficits] : no focal deficits [Oriented To Time, Place, And Person] : oriented to person, place, and time [Affect] : the affect was normal

## 2023-05-24 NOTE — ASSESSMENT
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 \par \par \par  [Treatment Education] : treatment education [Treatment Adherence] : treatment adherence [Rx Dose / Side Effects] : Rx dose/side effects

## 2023-05-24 NOTE — HISTORY OF PRESENT ILLNESS
[FreeTextEntry1] : 78 y/o female comes for followup for right knee PJI.\par \par She is s/p right TKA explant/spacer placement and now s/p joint replacement with flap by plastics.\par \par She is on oral Keflex with no side effects. Taking abx till middle of June. \par \par No GI or Gu issues. \par \par MSSA in cx.\par \par Her mobility is improving. Has some slight edema in right ankle area. \par \par \par \par

## 2023-06-01 LAB
CRP SERPL-MCNC: <3 MG/L
ERYTHROCYTE [SEDIMENTATION RATE] IN BLOOD BY WESTERGREN METHOD: 12 MM/HR
HCT VFR BLD CALC: 37 %
HGB BLD-MCNC: 11.6 G/DL
MCHC RBC-ENTMCNC: 31.4 GM/DL
MCHC RBC-ENTMCNC: 31.8 PG
MCV RBC AUTO: 101.4 FL
PLATELET # BLD AUTO: 196 K/UL
RBC # BLD: 3.65 M/UL
RBC # FLD: 12.8 %
WBC # FLD AUTO: 6.97 K/UL

## 2023-06-01 NOTE — REASON FOR VISIT
[Post Op: _________] : a [unfilled] post op visit [FreeTextEntry1] : s/p right total knee arthroplasty explantation with right medial gastrocnemius muscle flap and STSG reconstruction DOS: 03/20/23.

## 2023-06-08 ENCOUNTER — APPOINTMENT (OUTPATIENT)
Dept: ORTHOPEDIC SURGERY | Facility: CLINIC | Age: 78
End: 2023-06-08
Payer: MEDICARE

## 2023-06-08 VITALS — WEIGHT: 124 LBS | BODY MASS INDEX: 24.35 KG/M2 | HEIGHT: 60 IN

## 2023-06-08 PROCEDURE — 99024 POSTOP FOLLOW-UP VISIT: CPT

## 2023-06-08 NOTE — DISCUSSION/SUMMARY
[de-identified] : The patient is doing well after R TKA revision for PJI. WBAT. Daily home exercise program recommended. Abx per ID until stopped by ID. F/u PRS. Follow up with me in 6 months.

## 2023-06-08 NOTE — PHYSICAL EXAM
[de-identified] : Well developed, well nourished in no apparent distress, awake, alert and orientated to person, place and time with appropriate mood and affect\par Respirations are even and unlabored. Gait evaluation does not reveal a limp. There is no inguinal adenopathy. The affected limb is well-perfused with palpable pedal pulse, without skin lesions, shows a grossly normal motor and sensory examination. Incision is CDI and healing well. Knee motion is 0-95

## 2023-06-08 NOTE — HISTORY OF PRESENT ILLNESS
[de-identified] : Status-post revision right  total knee  arthroplasty and skin graft here for routine postoperative evaluation. Excellent progress is noted in terms of pain and restoration of function. Pain is well controlled with oral medications. There has been no change in medical health since discharge. The patient now only uses a cane. ESR/CRP now wnl.

## 2023-06-09 ENCOUNTER — APPOINTMENT (OUTPATIENT)
Dept: PLASTIC SURGERY | Facility: CLINIC | Age: 78
End: 2023-06-09
Payer: MEDICARE

## 2023-06-09 PROCEDURE — 99024 POSTOP FOLLOW-UP VISIT: CPT

## 2023-06-21 NOTE — REASON FOR VISIT
[FreeTextEntry1] :  s/p right total knee arthroplasty explantation with right medial gastrocnemius muscle flap and STSG reconstruction DOS: 03/20/23.

## 2023-11-20 NOTE — CONSULT NOTE ADULT - NS_MD_PANP_GEN_ALL_CORE
Patient: Viktoriya Sanders Date: 2023   : 1961    62 year old female      OUTPATIENT WOUND CARE PROGRESS NOTE    Consulting Provider:  Jose Carrillo DPM      SUBJECTIVE:    No chief complaint on file.      Wound/Ulcer Present:      Wound Leg Left Anterior Surgical Wound (Active)   Date First Assessed/Time First Assessed: 23 0946   Location: Leg  Laterality: Left  Modifier: Anterior  Primary Wound Type: Surgical Wound      Assessments 2023  9:00 AM   Wound Length (cm) 0.8 cm   Wound Width (cm) 0.5 cm   Wound Depth (cm) 0.3 cm   Wound Surface Area (cm^2) 0.4 cm^2   Wound Volume (cm^3) 0.12 cm^3   PhotoTaken? Yes   Wound Volume Change (Initial) -5.59 cm3   Wound Volume % Change (Initial) -97.9 %   Wound Volume Change (30 days) -1.28 cm3   Wound Volume % Change (30 days) -91.45 %              Maximum Baseline Ambulatory Status:  Ambulates unassisted    History of Present Illness:  This is a 62 year old female presents for follow-up evaluation of nonhealing diabetic wound upper left leg.  Patient is responding very well to the Apligraf we have been using.  The wound continues to get smaller.      Review of Systems:  Pertinent items are noted in HPI (history of present illness).    Past Medical History:   Diagnosis Date   • Arthritis    • COVID-19 virus infection 2022    congestion, tired- resolved; no hospitalization   • Dehiscence of operative wound     left tibia   • Diabetes mellitus (CMD)     type 2 DM   • Essential (primary) hypertension    • Gastroesophageal reflux disease    • High cholesterol    • [place] (hard of hearing)     right side   • Neck pain     radiating to shoulders and down back.   • Osteoporosis    • Other closed fracture of proximal end of left tibia with routine healing, subsequent encounter    • Psoriasis    • Smoker      Past Surgical History:   Procedure Laterality Date   • Achilles tendon surgery Right    • Ankle fracture surgery Left    • Appendectomy     •  Back surgery     • Cervical fusion     •  section, classic      x 3   • Fracture surgery Left     hand repair   • Gastric bypass     • Hernia repair      abdominal after gastric bypass   • Hysterectomy     • Removal gallbladder     • Rotator cuff repair Right    • Tibia fracture surgery Left 2023    ORIF   • Tonsillectomy       Social History     Socioeconomic History   • Marital status: Legally      Spouse name: Not on file   • Number of children: Not on file   • Years of education: Not on file   • Highest education level: Not on file   Occupational History   • Not on file   Tobacco Use   • Smoking status: Every Day     Current packs/day: 0.00     Types: Cigarettes     Last attempt to quit: 2022     Years since quittin.2   • Smokeless tobacco: Never   • Tobacco comments:     1 pack a day   Vaping Use   • Vaping Use: never used   Substance and Sexual Activity   • Alcohol use: Not Currently   • Drug use: Yes     Types: Marijuana     Comment: chewable-- instructed to hold prior to surgery   • Sexual activity: Not on file   Other Topics Concern   • Not on file   Social History Narrative   • Not on file     Social Determinants of Health     Financial Resource Strain: Low Risk  (2023)    Financial Resource Strain    • Social Determinants: Financial Resource Strain: None   Food Insecurity: No Food Insecurity (2023)    Food Insecurity    • Social Determinants: Food Insecurity: Never   Transportation Needs: No Transportation Needs (2023)    PRAPARE - Transportation    • Lack of Transportation (Medical): No    • Lack of Transportation (Non-Medical): No   Physical Activity: Not on file   Stress: Not on file   Social Connections: Socially Integrated (2023)    Social Connections    • Social Determinants: Social Connections: 5 or more times a week   Intimate Partner Violence: Not At Risk (10/2/2023)    Intimate Partner Violence    • Social Determinants: Intimate Partner Violence  Past Fear: No    • Social Determinants: Intimate Partner Violence Current Fear: No     Family History   Problem Relation Age of Onset   • Patient is unaware of any medical problems Mother    • Patient is unaware of any medical problems Father    • Cancer Sister         Breast and Ovarian   • Cancer Sister         Lung   • Cancer Brother         colon       Current Outpatient Medications   Medication Sig   • tiZANidine (ZANAFLEX) 2 MG tablet Take 1 tablet by mouth every 6 hours as needed for Muscle spasms.   • Cyanocobalamin 1000 MCG/ML Kit Inject as directed every 30 days.   • traMADol (ULTRAM) 50 MG tablet Take 1 tablet by mouth every 6 hours as needed for Pain. Indications: Pain, moderate   • meloxicam (MOBIC) 15 MG tablet Take 15 mg by mouth daily.   • Cholecalciferol 25 mcg (1,000 units) capsule Take 25 mcg by mouth daily. Indications: Vitamin D Deficiency   • docusate sodium-sennosides (SENOKOT S) 50-8.6 MG per tablet Take 1 tablet by mouth 2 times daily as needed for Constipation.   • ezetimibe (ZETIA) 10 MG tablet Take 10 mg by mouth daily.   • zoledronic acid (RECLAST) 5 MG/100ML injectable solution Inject into the vein every 12 months. infusion   • Continuous Blood Gluc Sensor (FreeStyle Miriam 2 Sensor) Misc    • Farxiga 10 MG tablet Take 10 mg by mouth every morning.   • esomeprazole (NexIUM) 40 MG capsule Take 40 mg by mouth daily.   • FREESTYLE TEST STRIPS test strip    • hydroCHLOROthiazide (HYDRODIURIL) 12.5 MG tablet Take 12.5 mg by mouth daily.   • Tresiba FlexTouch 200 UNIT/ML pen-injector Inject 32 Units into the skin daily.   • Insulin Lispro, 1 Unit Dial, (HumaLOG KwikPen) 100 UNIT/ML pen-injector Inject 0-10 Units into the skin in the morning and 0-10 Units at noon and 0-10 Units in the evening. Inject before meals. As needed depending on blood sugars   • lisinopril (ZESTRIL) 2.5 MG tablet Take 2.5 mg by mouth daily.   • Cosentyx Sensoready, 300 MG, 150 MG/ML Solution Auto-injector every 30  days. LD end of October   • Livalo 2 MG Tab Take 2 mg by mouth daily.     No current facility-administered medications for this encounter.        ALLERGIES:  Patient has no known allergies.    OBJECTIVE:  Vital Signs:    Visit Vitals  /75 (BP Location: LUE - Left upper extremity, Patient Position: Sitting)   Pulse 96   Temp 96.6 °F (35.9 °C) (Temporal)   Resp 18   Ht 5' 8\" (1.727 m)   Wt 75.8 kg (167 lb)   BMI 25.39 kg/m²         Physical Exam:  General appearance: oriented to person, place, time and situation  Physical Exam        PROCEDURE:  Cellular and Tissue Based Products:  Dermal Replacement - Apligraf Application     Apligraf consists of living cells and structural proteins. The lower dermal layer combines bovine type 1 collagen and human fibroblasts which produce additional matrix proteins. The upper epidermal layer is formed by promoting human keratinocytes first to multiply and then to differentiate to replicate the architecture of the human epidermis. Apligraf may be reapplied if needed until wound healing is complete for up to five applications.     Description of Procedure:  After time out and informed consent obtained, Apligraf was applied to the wound on the upper left leg after standard wound bed preparation. There were no signs of infection and the wound bed was prepared with appropriate method of debridement. The Apligraf was fenestrated with a scalpel blade, applied to the wound, and affixed with Steri-Strips. Apligraf was covered with a primary non-adherent dressing (e.g., Adaptic, Veil). An appropriate secondary dressing was then applied determined by wound type.     At 7 day follow up, the secondary dressing was removed for wound inspection and evaluation. Primary dressing left undisturbed to facilitate integration of Apligraf, and reapplication with appropriate secondary dressings. On second follow up (14 days), secondary and primary dressings were removed for wound inspection and  evaluation. The wound bed was not disturbed or debrided.  On subsequent weekly visits new primary and secondary dressings were changed/applied and wound healing monitored. Patient tolerated the procedure well.         Complications:  None        Procedure was Performed by:  Jose Carrillo DPM    Laboratory assessments reviewed:  No results found for: \"PAB\"   Albumin (g/dL)   Date Value   07/30/2023 3.2 (L)   07/29/2023 3.5 (L)      No results available in last 24 hours    Lab Results   Component Value Date    WBC 6.0 08/02/2023    GLUCOSE 184 (H) 08/02/2023    CREATININE 0.78 08/02/2023        Culture results:  No results found for: \"SDES\" No results found for: \"CULT\"     Diagnostic Assessments Reviewed:  No new update            Wound treatment goals are palliative:  No    DIAGNOSES:  Problem List Items Addressed This Visit    None       Medical Decision Making:     Today we examined the condition.  She continues to improve and the wound is getting very close to being healed.  Today we did debride the wound and applied another Apligraf dressing as described.  She is tolerating this well and is working very well for her.  We will follow-up in 1 week.    Local Wound Care  Complete Wound Care  Every visit  Diagnosis: Surgical wound  Dressing change(s) to be done by: Wound Care Team  Dressing change(s) to be done by: Other (specify)  Other (specify): patient  Dressing frequency: Two times/week (specify)  Wound location: Left anterior leg  Dressing change(s) to be done using: Clean Technique  Clean wound with: Normal saline  Clean wound with: Vashe  Protect periwound with: Other (specify)  Protect periwound with: Skin prep  Other (specify): A+D oint to dry intact skin  Topical agents: Apligraf  Topical agents: Other (specify)  Apply Other (specify) to: Silicone contact layer,  Dressing type: Gauze (multiple)  Dressing type: Alginate  Dressing type: ABD dressing  Dressing type: Gauze roll-plain  Specify order of  application: Alginate (secondary), gauze for boster (secondary), ABD, kerlix, surgilast. Keep the contact layer intact and change only the external dressing on Friday  Wound compression: Other (specify)  Wound compression: Tubular compression bandage - Low (5-10 mmHg)  Other (specify): tubigrip size E or own compression socks 20-30mmHg. Pt is not tolerating any adhesive dressing.  Compression for extremity: Left lower leg    Order Comments:  None     Risk with Open Wound    As discussed, with open skin there is always a risk of infection.  Infection may lead to need of further surgical debridement or possible amputation.  Infection can possibly lead sepsis and subsequently death or permanent disability.       Offloading    Offloading was reviewed and discussed with patient today.       Diabetic Management  -Recommend tight glucose control for better wound healing  -Follow up with PCP for DM management and chronic disease management.  Goal for glucose is less than 150 at all times and a HgbA1c of less than 7.4.    Edema  Negative edema noted    Infectious Disease  No signs of infection    Imaging & Vascular  -    Labs  -    Consults  -    Pain  -    Follow Up  No follow-ups on file.   Patient to follow up as scheduled for continued management and recommendations.    All questions solicited and answered.       Patient stable. All questions were answered. Follow up in 1 Week(s).   Attending and PA/NP shared services statement (NON-critical care):

## 2023-12-05 ENCOUNTER — APPOINTMENT (OUTPATIENT)
Dept: ORTHOPEDIC SURGERY | Facility: CLINIC | Age: 78
End: 2023-12-05
Payer: MEDICARE

## 2023-12-05 VITALS — BODY MASS INDEX: 23.92 KG/M2 | HEIGHT: 62 IN | WEIGHT: 130 LBS

## 2023-12-05 PROCEDURE — 73564 X-RAY EXAM KNEE 4 OR MORE: CPT | Mod: RT

## 2023-12-05 PROCEDURE — 99213 OFFICE O/P EST LOW 20 MIN: CPT

## 2024-04-22 NOTE — ED PROVIDER NOTE - SKIN, MLM
Slight elevation in the muscle enzyme which is very stable and similar to previously.  Rest of the labs are fine.   Skin normal color for race, warm, dry and intact. No evidence of rash.

## 2024-04-30 RX ORDER — METOPROLOL TARTRATE 50 MG
1 TABLET ORAL
Qty: 0 | Refills: 0 | DISCHARGE

## 2024-04-30 RX ORDER — SITAGLIPTIN 50 MG/1
1 TABLET, FILM COATED ORAL
Qty: 0 | Refills: 0 | DISCHARGE

## 2024-04-30 RX ORDER — ASPIRIN/CALCIUM CARB/MAGNESIUM 324 MG
1 TABLET ORAL
Qty: 0 | Refills: 0 | DISCHARGE

## 2024-04-30 RX ORDER — GLUCOSAMINE/CHONDROITIN/C/MANG 500-400 MG
1 CAPSULE ORAL
Qty: 0 | Refills: 0 | DISCHARGE

## 2024-04-30 RX ORDER — ROSUVASTATIN CALCIUM 5 MG/1
1 TABLET ORAL
Qty: 0 | Refills: 0 | DISCHARGE

## 2024-04-30 RX ORDER — ASCORBIC ACID 60 MG
1 TABLET,CHEWABLE ORAL
Qty: 0 | Refills: 0 | DISCHARGE

## 2024-04-30 RX ORDER — TELMISARTAN 20 MG/1
1 TABLET ORAL
Qty: 0 | Refills: 0 | DISCHARGE

## 2024-04-30 RX ORDER — VITAMIN E 100 UNIT
1 CAPSULE ORAL
Qty: 0 | Refills: 0 | DISCHARGE

## 2024-04-30 RX ORDER — AMLODIPINE BESYLATE 2.5 MG/1
1 TABLET ORAL
Qty: 0 | Refills: 0 | DISCHARGE

## 2024-04-30 RX ORDER — DICLOFENAC SODIUM 75 MG/1
1 TABLET, DELAYED RELEASE ORAL
Qty: 0 | Refills: 0 | DISCHARGE

## 2024-04-30 RX ORDER — RALOXIFENE HYDROCHLORIDE 60 MG/1
1 TABLET, COATED ORAL
Qty: 0 | Refills: 0 | DISCHARGE

## 2024-06-04 ENCOUNTER — APPOINTMENT (OUTPATIENT)
Dept: ORTHOPEDIC SURGERY | Facility: CLINIC | Age: 79
End: 2024-06-04
Payer: MEDICARE

## 2024-06-04 VITALS — BODY MASS INDEX: 23.92 KG/M2 | HEIGHT: 62 IN | WEIGHT: 130 LBS

## 2024-06-04 DIAGNOSIS — Z96.651 PRESENCE OF RIGHT ARTIFICIAL KNEE JOINT: ICD-10-CM

## 2024-06-04 PROCEDURE — 73564 X-RAY EXAM KNEE 4 OR MORE: CPT | Mod: RT

## 2024-06-04 PROCEDURE — 99214 OFFICE O/P EST MOD 30 MIN: CPT

## 2024-06-04 NOTE — DISCUSSION/SUMMARY
[de-identified] : The patient is doing well after right total knee arthroplasty revision. Continue to be weight bearing as tolerated without restriction. Daily home exercise program recommended. Follow up is recommended in 1 year

## 2024-06-04 NOTE — HISTORY OF PRESENT ILLNESS
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 The patient reports good pain relief since surgery in the replaced joint and satisfactory restoration of function in terms of activities of daily living. The patient no longer requires an assistive device for ambulation, does not require pain medication, and completed postoperative physical therapy. They report unlimited activities of daily living and unlimited walking tolerance. The patient is thrilled with their progress from surgery and ultimate outcome.

## 2024-06-04 NOTE — PHYSICAL EXAM
[de-identified] : Patient is well nourished, well-developed, in no acute distress, with appropriate mood and affect. The patient is oriented to time, place, and person. Respirations are even and unlabored. Gait evaluation does not reveal a limp. There is no inguinal adenopathy. Examination of the contralateral knee shows normal range of motion, strength, no tenderness, and intact skin. The operative limb is well-perfused, has a well appearing surgical scar, and shows a grossly normal motor and sensory examination. Knee motion is painless and the right knee moves from 0-100 degrees. The knee is stable within that range-of-motion to AP and ML stress. The alignment of the knee is neutral. Muscle strength is normal. Quadriceps and hamstring muscle strength is normal bilaterally. Pedal pulses are palpable. [de-identified] : AP, lateral, tunnel, and sunrise knee x-rays of the right knee were ordered and obtained in the office and demonstrate satisfactory position and alignment of the components are present. No signs of loosening are seen.

## 2024-08-08 NOTE — ED ADULT NURSE NOTE - DRUG PRE-SCREENING (DAST -1)
Rx Refill Note  Requested Prescriptions     Pending Prescriptions Disp Refills    solifenacin (VESICARE) 10 MG tablet [Pharmacy Med Name: Solifenacin Succinate 10 MG Oral Tablet] 30 tablet 0     Sig: Take 1 tablet by mouth once daily      Last office visit with prescribing clinician: 5/1/2024   Last telemedicine visit with prescribing clinician: Visit date not found   Next office visit with prescribing clinician: 9/5/2024       Lisa Olsen MA  08/08/24, 07:57 EDT  
Statement Selected

## 2025-03-11 NOTE — DISCUSSION/SUMMARY
[de-identified] : This patient has a painful right total knee arthroplasty.  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  However 1 aspirate has been negative and she had 1 cc aspirated last week but this was insufficient to send for culture.  I asked her to obtain a new ESR and CRP today at the lab.  I discussed with the options of 1 stage versus two-stage revision joint replacement surgery and she elected to defer revision surgery at this time.  She understands that this infection if there is one present to become out of control leading to sepsis and death.  I referred her to Dr. Holley for infectious disease for an evaluation from the medical side for management of possibly infected right total knee arthroplasty.  Asked her to see me in 1 month for another evaluation.  If the pain is getting much worse or she develops fevers or chills that she can call my office right away and come and see me or she should go to the emergency department.  She understands that infection of the knee replaced will never get better short of surgery.  Antibiotics will only keep the infection at bay.  Several questions sought and answered.
Assistance with ambulation/Assistance OOB with selected safe patient handling equipment/Communicate Risk of Fall with Harm to all staff/Discuss with provider need for PT consult/Monitor for mental status changes/Monitor gait and stability/Provide patient with walking aids - walker, cane, crutches/Reinforce activity limits and safety measures with patient and family/Reorient to person, place and time as needed/Review medications for side effects contributing to fall risk/Sit up slowly, dangle for a short time, stand at bedside before walking/Tailored Fall Risk Interventions/Use of alarms - bed, chair and/or voice tab/Visual Cue: Yellow wristband and red socks/Bed in lowest position, wheels locked, appropriate side rails in place/Call bell, personal items and telephone in reach/Instruct patient to call for assistance before getting out of bed or chair/Non-slip footwear when patient is out of bed/[place] to call system/Physically safe environment - no spills, clutter or unnecessary equipment/Purposeful Proactive Rounding/Room/bathroom lighting operational, light cord in reach

## 2025-06-17 ENCOUNTER — APPOINTMENT (OUTPATIENT)
Dept: ORTHOPEDIC SURGERY | Facility: CLINIC | Age: 80
End: 2025-06-17
Payer: MEDICARE

## 2025-06-17 VITALS — BODY MASS INDEX: 25.49 KG/M2 | HEIGHT: 61 IN | WEIGHT: 135 LBS

## 2025-06-17 PROCEDURE — 73564 X-RAY EXAM KNEE 4 OR MORE: CPT | Mod: 50

## 2025-06-17 PROCEDURE — G2211 COMPLEX E/M VISIT ADD ON: CPT

## 2025-06-17 PROCEDURE — 99214 OFFICE O/P EST MOD 30 MIN: CPT

## (undated) DEVICE — PACK LIJ BASIC ORTHO

## (undated) DEVICE — POSITIONER STRAP ARMBOARD VELCRO TS-30

## (undated) DEVICE — TOURNIQUET CUFF 34" DUAL PORT W PLC

## (undated) DEVICE — GLV 7.5 PROTEXIS (WHITE)

## (undated) DEVICE — DRAPE U POLY BLUE 60"X60"

## (undated) DEVICE — SOL IRR BAG NS 0.9% 3000ML

## (undated) DEVICE — SUT VICRYL 1 36" CTX UNDYED

## (undated) DEVICE — SOL IRR POUR H2O 1500ML

## (undated) DEVICE — PACK TOTAL JOINT

## (undated) DEVICE — DRSG COBAN 6"

## (undated) DEVICE — STAPLER SKIN VISI-STAT 35 WIDE

## (undated) DEVICE — DRSG AQUACEL 3.5 X 10"

## (undated) DEVICE — GLV 8 PROTEXIS (CREAM) MICRO

## (undated) DEVICE — DRSG STOCKINETTE IMPERVIOUS XL

## (undated) DEVICE — PREP CHLORAPREP HI-LITE ORANGE 26ML

## (undated) DEVICE — Device

## (undated) DEVICE — DRSG ACE BANDAGE 6"

## (undated) DEVICE — LABELS BLANK W PEN

## (undated) DEVICE — DRSG COMBINE 5X9"

## (undated) DEVICE — HANDPIECE IRRIGATION W/ BATTERY PACKAND HIGH FLOW TIP

## (undated) DEVICE — DRAPE 3/4 SHEET 52X76"

## (undated) DEVICE — WARMING BLANKET UPPER ADULT

## (undated) DEVICE — CANISTER DISPOSABLE THIN WALL 3000CC

## (undated) DEVICE — ELCTR GROUNDING PAD ADULT COVIDIEN

## (undated) DEVICE — TAPE SILK 3"

## (undated) DEVICE — VENODYNE/SCD SLEEVE CALF MEDIUM

## (undated) DEVICE — SAW BLADE STRYKER SAGITTAL DUAL CUT 25X89X90

## (undated) DEVICE — HOOD T5 PEELAWAY

## (undated) DEVICE — SUCTION YANKAUER NO CONTROL VENT

## (undated) DEVICE — SUT VICRYL 2-0 27" CP-1 UNDYED

## (undated) DEVICE — SOL IRR POUR NS 0.9% 1000ML

## (undated) DEVICE — DRAPE IOBAN 33" X 23"

## (undated) DEVICE — TOURNIQUET ESMARK 6"

## (undated) DEVICE — PROTECTOR HEEL / ELBOW

## (undated) DEVICE — SUT VICRYL 0 27" OS-6 UNDYED